# Patient Record
Sex: FEMALE | Race: ASIAN | NOT HISPANIC OR LATINO | Employment: FULL TIME | URBAN - METROPOLITAN AREA
[De-identification: names, ages, dates, MRNs, and addresses within clinical notes are randomized per-mention and may not be internally consistent; named-entity substitution may affect disease eponyms.]

---

## 2017-03-27 ENCOUNTER — AMBULATORY - HEALTHEAST (OUTPATIENT)
Dept: NURSING | Facility: CLINIC | Age: 27
End: 2017-03-27

## 2017-07-27 ENCOUNTER — COMMUNICATION - HEALTHEAST (OUTPATIENT)
Dept: FAMILY MEDICINE | Facility: CLINIC | Age: 27
End: 2017-07-27

## 2017-07-28 ENCOUNTER — AMBULATORY - HEALTHEAST (OUTPATIENT)
Dept: FAMILY MEDICINE | Facility: CLINIC | Age: 27
End: 2017-07-28

## 2017-07-28 DIAGNOSIS — R06.81 APNEA: ICD-10-CM

## 2017-07-28 DIAGNOSIS — R06.83 SNORING: ICD-10-CM

## 2017-07-30 ENCOUNTER — COMMUNICATION - HEALTHEAST (OUTPATIENT)
Dept: FAMILY MEDICINE | Facility: CLINIC | Age: 27
End: 2017-07-30

## 2017-08-31 ENCOUNTER — OFFICE VISIT - HEALTHEAST (OUTPATIENT)
Dept: SLEEP MEDICINE | Facility: CLINIC | Age: 27
End: 2017-08-31

## 2017-08-31 DIAGNOSIS — G47.8 SLEEP DYSFUNCTION WITH SLEEP STAGE DISTURBANCE: ICD-10-CM

## 2017-08-31 DIAGNOSIS — G47.10 HYPERSOMNIA, UNSPECIFIED: ICD-10-CM

## 2017-08-31 DIAGNOSIS — R29.818 SUSPECTED SLEEP APNEA: ICD-10-CM

## 2017-08-31 DIAGNOSIS — R06.83 SNORING: ICD-10-CM

## 2017-08-31 ASSESSMENT — MIFFLIN-ST. JEOR: SCORE: 1978.71

## 2017-09-05 ENCOUNTER — COMMUNICATION - HEALTHEAST (OUTPATIENT)
Dept: FAMILY MEDICINE | Facility: CLINIC | Age: 27
End: 2017-09-05

## 2017-09-11 ENCOUNTER — PRENATAL OFFICE VISIT - HEALTHEAST (OUTPATIENT)
Dept: FAMILY MEDICINE | Facility: CLINIC | Age: 27
End: 2017-09-11

## 2017-09-11 DIAGNOSIS — Z32.01 POSITIVE PREGNANCY TEST: ICD-10-CM

## 2017-09-11 DIAGNOSIS — Z34.90 NORMAL PREGNANCY: ICD-10-CM

## 2017-09-11 DIAGNOSIS — E55.9 VITAMIN D DEFICIENCY: ICD-10-CM

## 2017-09-11 DIAGNOSIS — N92.6 ABNORMAL MENSES: ICD-10-CM

## 2017-09-11 DIAGNOSIS — Z91.09 ENVIRONMENTAL ALLERGIES: ICD-10-CM

## 2017-09-11 LAB — HIV 1+2 AB+HIV1 P24 AG SERPL QL IA: NEGATIVE

## 2017-09-12 ENCOUNTER — HOSPITAL ENCOUNTER (OUTPATIENT)
Dept: ULTRASOUND IMAGING | Facility: HOSPITAL | Age: 27
Discharge: HOME | End: 2017-09-12
Attending: PHYSICIAN ASSISTANT

## 2017-09-12 DIAGNOSIS — Z34.90 NORMAL PREGNANCY: ICD-10-CM

## 2017-09-12 DIAGNOSIS — Z32.01 POSITIVE PREGNANCY TEST: ICD-10-CM

## 2017-09-12 LAB
HBV SURFACE AG SERPL QL IA: NEGATIVE
SYPHILIS RPR SCREEN - HISTORICAL: NORMAL

## 2017-09-14 ENCOUNTER — RECORDS - HEALTHEAST (OUTPATIENT)
Dept: SLEEP MEDICINE | Facility: CLINIC | Age: 27
End: 2017-09-14

## 2017-09-14 ENCOUNTER — COMMUNICATION - HEALTHEAST (OUTPATIENT)
Dept: FAMILY MEDICINE | Facility: CLINIC | Age: 27
End: 2017-09-14

## 2017-09-14 ENCOUNTER — RECORDS - HEALTHEAST (OUTPATIENT)
Dept: ADMINISTRATIVE | Facility: OTHER | Age: 27
End: 2017-09-14

## 2017-09-14 DIAGNOSIS — G47.30 SLEEP APNEA, UNSPECIFIED: ICD-10-CM

## 2017-09-14 DIAGNOSIS — G47.10 HYPERSOMNIA, UNSPECIFIED: ICD-10-CM

## 2017-09-14 DIAGNOSIS — R06.83 SNORING: ICD-10-CM

## 2017-09-14 DIAGNOSIS — G47.8 OTHER SLEEP DISORDERS: ICD-10-CM

## 2017-09-18 ENCOUNTER — COMMUNICATION - HEALTHEAST (OUTPATIENT)
Dept: SLEEP MEDICINE | Facility: CLINIC | Age: 27
End: 2017-09-18

## 2017-10-12 ENCOUNTER — COMMUNICATION - HEALTHEAST (OUTPATIENT)
Dept: FAMILY MEDICINE | Facility: CLINIC | Age: 27
End: 2017-10-12

## 2017-10-18 ENCOUNTER — OFFICE VISIT - HEALTHEAST (OUTPATIENT)
Dept: FAMILY MEDICINE | Facility: CLINIC | Age: 27
End: 2017-10-18

## 2017-10-18 ENCOUNTER — COMMUNICATION - HEALTHEAST (OUTPATIENT)
Dept: FAMILY MEDICINE | Facility: CLINIC | Age: 27
End: 2017-10-18

## 2017-10-18 DIAGNOSIS — N76.0 VAGINITIS: ICD-10-CM

## 2017-10-19 ENCOUNTER — OFFICE VISIT - HEALTHEAST (OUTPATIENT)
Dept: SLEEP MEDICINE | Facility: CLINIC | Age: 27
End: 2017-10-19

## 2017-10-19 ENCOUNTER — AMBULATORY - HEALTHEAST (OUTPATIENT)
Dept: SLEEP MEDICINE | Facility: CLINIC | Age: 27
End: 2017-10-19

## 2017-10-19 DIAGNOSIS — G47.33 OSA (OBSTRUCTIVE SLEEP APNEA): ICD-10-CM

## 2017-10-19 DIAGNOSIS — E66.9 OBESITY: ICD-10-CM

## 2017-10-19 DIAGNOSIS — G47.10 HYPERSOMNIA: ICD-10-CM

## 2017-10-19 DIAGNOSIS — G47.8 SLEEP DYSFUNCTION WITH SLEEP STAGE DISTURBANCE: ICD-10-CM

## 2017-10-19 ASSESSMENT — MIFFLIN-ST. JEOR: SCORE: 1975.54

## 2017-10-24 ENCOUNTER — PRENATAL OFFICE VISIT - HEALTHEAST (OUTPATIENT)
Dept: FAMILY MEDICINE | Facility: CLINIC | Age: 27
End: 2017-10-24

## 2017-10-24 ENCOUNTER — OFFICE VISIT - HEALTHEAST (OUTPATIENT)
Dept: FAMILY MEDICINE | Facility: CLINIC | Age: 27
End: 2017-10-24

## 2017-10-24 DIAGNOSIS — Z34.91 FIRST TRIMESTER PREGNANCY: ICD-10-CM

## 2017-10-24 DIAGNOSIS — R30.0 DYSURIA: ICD-10-CM

## 2017-10-24 DIAGNOSIS — N39.0 UTI (URINARY TRACT INFECTION): ICD-10-CM

## 2017-10-24 DIAGNOSIS — Z86.32 HX OF GESTATIONAL DIABETES IN PRIOR PREGNANCY, CURRENTLY PREGNANT: ICD-10-CM

## 2017-10-24 DIAGNOSIS — Z34.92 SECOND TRIMESTER PREGNANCY: ICD-10-CM

## 2017-10-24 DIAGNOSIS — O09.299 HX OF GESTATIONAL DIABETES IN PRIOR PREGNANCY, CURRENTLY PREGNANT: ICD-10-CM

## 2017-10-24 LAB — HBA1C MFR BLD: 6.1 % (ref 3.5–6)

## 2017-10-27 ENCOUNTER — COMMUNICATION - HEALTHEAST (OUTPATIENT)
Dept: FAMILY MEDICINE | Facility: CLINIC | Age: 27
End: 2017-10-27

## 2017-10-30 ENCOUNTER — COMMUNICATION - HEALTHEAST (OUTPATIENT)
Dept: FAMILY MEDICINE | Facility: CLINIC | Age: 27
End: 2017-10-30

## 2017-10-31 ENCOUNTER — AMBULATORY - HEALTHEAST (OUTPATIENT)
Dept: EDUCATION SERVICES | Facility: CLINIC | Age: 27
End: 2017-10-31

## 2017-11-10 ENCOUNTER — AMBULATORY - HEALTHEAST (OUTPATIENT)
Dept: EDUCATION SERVICES | Facility: CLINIC | Age: 27
End: 2017-11-10

## 2017-11-17 ENCOUNTER — OFFICE VISIT - HEALTHEAST (OUTPATIENT)
Dept: EDUCATION SERVICES | Facility: CLINIC | Age: 27
End: 2017-11-17

## 2017-11-17 ENCOUNTER — COMMUNICATION - HEALTHEAST (OUTPATIENT)
Dept: FAMILY MEDICINE | Facility: CLINIC | Age: 27
End: 2017-11-17

## 2017-11-21 ENCOUNTER — PRENATAL OFFICE VISIT - HEALTHEAST (OUTPATIENT)
Dept: FAMILY MEDICINE | Facility: CLINIC | Age: 27
End: 2017-11-21

## 2017-11-21 DIAGNOSIS — Z34.92 SECOND TRIMESTER PREGNANCY: ICD-10-CM

## 2017-12-01 ENCOUNTER — OFFICE VISIT - HEALTHEAST (OUTPATIENT)
Dept: EDUCATION SERVICES | Facility: CLINIC | Age: 27
End: 2017-12-01

## 2017-12-01 ENCOUNTER — COMMUNICATION - HEALTHEAST (OUTPATIENT)
Dept: FAMILY MEDICINE | Facility: CLINIC | Age: 27
End: 2017-12-01

## 2017-12-28 ENCOUNTER — AMBULATORY - HEALTHEAST (OUTPATIENT)
Dept: SLEEP MEDICINE | Facility: CLINIC | Age: 27
End: 2017-12-28

## 2018-01-03 ENCOUNTER — AMBULATORY - HEALTHEAST (OUTPATIENT)
Dept: SLEEP MEDICINE | Facility: CLINIC | Age: 28
End: 2018-01-03

## 2018-01-12 ENCOUNTER — COMMUNICATION - HEALTHEAST (OUTPATIENT)
Dept: FAMILY MEDICINE | Facility: CLINIC | Age: 28
End: 2018-01-12

## 2018-01-17 ENCOUNTER — COMMUNICATION - HEALTHEAST (OUTPATIENT)
Dept: FAMILY MEDICINE | Facility: CLINIC | Age: 28
End: 2018-01-17

## 2018-01-24 ENCOUNTER — AMBULATORY - HEALTHEAST (OUTPATIENT)
Dept: SLEEP MEDICINE | Facility: CLINIC | Age: 28
End: 2018-01-24

## 2018-01-25 ENCOUNTER — COMMUNICATION - HEALTHEAST (OUTPATIENT)
Dept: FAMILY MEDICINE | Facility: CLINIC | Age: 28
End: 2018-01-25

## 2018-01-28 ENCOUNTER — COMMUNICATION - HEALTHEAST (OUTPATIENT)
Dept: FAMILY MEDICINE | Facility: CLINIC | Age: 28
End: 2018-01-28

## 2018-02-28 ENCOUNTER — PRENATAL OFFICE VISIT - HEALTHEAST (OUTPATIENT)
Dept: FAMILY MEDICINE | Facility: CLINIC | Age: 28
End: 2018-02-28

## 2018-02-28 DIAGNOSIS — Z34.93 NORMAL PREGNANCY IN THIRD TRIMESTER: ICD-10-CM

## 2018-02-28 LAB
ALBUMIN UR-MCNC: NEGATIVE MG/DL
GLUCOSE UR STRIP-MCNC: NEGATIVE MG/DL
KETONES UR STRIP-MCNC: NEGATIVE MG/DL

## 2018-03-06 ENCOUNTER — RECORDS - HEALTHEAST (OUTPATIENT)
Dept: ADMINISTRATIVE | Facility: OTHER | Age: 28
End: 2018-03-06

## 2018-03-12 ENCOUNTER — RECORDS - HEALTHEAST (OUTPATIENT)
Dept: ADMINISTRATIVE | Facility: OTHER | Age: 28
End: 2018-03-12

## 2018-03-14 ENCOUNTER — PRENATAL OFFICE VISIT - HEALTHEAST (OUTPATIENT)
Dept: FAMILY MEDICINE | Facility: CLINIC | Age: 28
End: 2018-03-14

## 2018-03-14 DIAGNOSIS — Z34.93 NORMAL PREGNANCY IN THIRD TRIMESTER: ICD-10-CM

## 2018-03-14 DIAGNOSIS — O24.419 GESTATIONAL DIABETES: ICD-10-CM

## 2018-03-14 DIAGNOSIS — L30.9 ECZEMA: ICD-10-CM

## 2018-03-14 LAB
ALBUMIN UR-MCNC: ABNORMAL MG/DL
GLUCOSE UR STRIP-MCNC: ABNORMAL MG/DL
KETONES UR STRIP-MCNC: ABNORMAL MG/DL

## 2018-03-15 LAB
ALLERGIC TO PENICILLIN: NO
GP B STREP DNA SPEC QL NAA+PROBE: NEGATIVE

## 2018-03-20 ENCOUNTER — COMMUNICATION - HEALTHEAST (OUTPATIENT)
Dept: FAMILY MEDICINE | Facility: CLINIC | Age: 28
End: 2018-03-20

## 2018-03-27 ENCOUNTER — RECORDS - HEALTHEAST (OUTPATIENT)
Dept: ADMINISTRATIVE | Facility: OTHER | Age: 28
End: 2018-03-27

## 2018-03-28 ENCOUNTER — PRENATAL OFFICE VISIT - HEALTHEAST (OUTPATIENT)
Dept: FAMILY MEDICINE | Facility: CLINIC | Age: 28
End: 2018-03-28

## 2018-03-28 ENCOUNTER — HOSPITAL ENCOUNTER (OUTPATIENT)
Dept: ULTRASOUND IMAGING | Facility: HOSPITAL | Age: 28
Discharge: HOME | End: 2018-03-28
Attending: FAMILY MEDICINE

## 2018-03-28 DIAGNOSIS — O24.419 GESTATIONAL DIABETES: ICD-10-CM

## 2018-03-28 LAB
ALBUMIN UR-MCNC: ABNORMAL MG/DL
BASOPHILS # BLD AUTO: 0 THOU/UL (ref 0–0.2)
BASOPHILS NFR BLD AUTO: 0 % (ref 0–2)
EOSINOPHIL # BLD AUTO: 0.2 THOU/UL (ref 0–0.4)
EOSINOPHIL NFR BLD AUTO: 2 % (ref 0–6)
ERYTHROCYTE [DISTWIDTH] IN BLOOD BY AUTOMATED COUNT: 13 % (ref 11–14.5)
GLUCOSE UR STRIP-MCNC: NEGATIVE MG/DL
HBA1C MFR BLD: 6.3 % (ref 3.5–6)
HCT VFR BLD AUTO: 35.5 % (ref 35–47)
HGB BLD-MCNC: 11.5 G/DL (ref 12–16)
KETONES UR STRIP-MCNC: NEGATIVE MG/DL
LYMPHOCYTES # BLD AUTO: 1.4 THOU/UL (ref 0.8–4.4)
LYMPHOCYTES NFR BLD AUTO: 19 % (ref 20–40)
MCH RBC QN AUTO: 28.5 PG (ref 27–34)
MCHC RBC AUTO-ENTMCNC: 32.4 G/DL (ref 32–36)
MCV RBC AUTO: 88 FL (ref 80–100)
MONOCYTES # BLD AUTO: 0.5 THOU/UL (ref 0–0.9)
MONOCYTES NFR BLD AUTO: 6 % (ref 2–10)
NEUTROPHILS # BLD AUTO: 5.3 THOU/UL (ref 2–7.7)
NEUTROPHILS NFR BLD AUTO: 72 % (ref 50–70)
PLATELET # BLD AUTO: 188 THOU/UL (ref 140–440)
PMV BLD AUTO: 8.7 FL (ref 7–10)
RBC # BLD AUTO: 4.03 MILL/UL (ref 3.8–5.4)
WBC: 7.3 THOU/UL (ref 4–11)

## 2018-03-29 LAB — T PALLIDUM AB SER QL: NEGATIVE

## 2018-04-03 ENCOUNTER — COMMUNICATION - HEALTHEAST (OUTPATIENT)
Dept: FAMILY MEDICINE | Facility: CLINIC | Age: 28
End: 2018-04-03

## 2018-04-03 DIAGNOSIS — O24.414 INSULIN CONTROLLED GESTATIONAL DIABETES MELLITUS (GDM) IN THIRD TRIMESTER: ICD-10-CM

## 2018-04-05 ENCOUNTER — COMMUNICATION - HEALTHEAST (OUTPATIENT)
Dept: FAMILY MEDICINE | Facility: CLINIC | Age: 28
End: 2018-04-05

## 2018-04-06 ENCOUNTER — PRENATAL OFFICE VISIT - HEALTHEAST (OUTPATIENT)
Dept: FAMILY MEDICINE | Facility: CLINIC | Age: 28
End: 2018-04-06

## 2018-04-06 ENCOUNTER — HOSPITAL ENCOUNTER (OUTPATIENT)
Dept: ULTRASOUND IMAGING | Facility: CLINIC | Age: 28
Discharge: HOME | End: 2018-04-06
Attending: FAMILY MEDICINE

## 2018-04-06 DIAGNOSIS — O24.414 INSULIN CONTROLLED GESTATIONAL DIABETES MELLITUS (GDM) IN THIRD TRIMESTER: ICD-10-CM

## 2018-04-06 DIAGNOSIS — O24.414 INSULIN CONTROLLED GESTATIONAL DIABETES MELLITUS (GDM) DURING PREGNANCY, ANTEPARTUM: ICD-10-CM

## 2018-04-06 DIAGNOSIS — Z34.93 NORMAL PREGNANCY IN THIRD TRIMESTER: ICD-10-CM

## 2018-04-13 ENCOUNTER — AMBULATORY - HEALTHEAST (OUTPATIENT)
Dept: FAMILY MEDICINE | Facility: CLINIC | Age: 28
End: 2018-04-13

## 2018-04-13 DIAGNOSIS — K59.00 CONSTIPATION: ICD-10-CM

## 2018-04-13 DIAGNOSIS — R10.2 PERINEAL PAIN: ICD-10-CM

## 2018-05-09 ENCOUNTER — COMMUNICATION - HEALTHEAST (OUTPATIENT)
Dept: FAMILY MEDICINE | Facility: CLINIC | Age: 28
End: 2018-05-09

## 2018-05-14 ENCOUNTER — OFFICE VISIT - HEALTHEAST (OUTPATIENT)
Dept: FAMILY MEDICINE | Facility: CLINIC | Age: 28
End: 2018-05-14

## 2018-05-14 DIAGNOSIS — Z30.9 CONTRACEPTION MANAGEMENT: ICD-10-CM

## 2018-05-14 DIAGNOSIS — Z30.017 NEXPLANON INSERTION: ICD-10-CM

## 2018-05-14 LAB
HCG UR QL: NEGATIVE
SP GR UR STRIP: 1.02 (ref 1–1.03)

## 2018-09-07 ENCOUNTER — COMMUNICATION - HEALTHEAST (OUTPATIENT)
Dept: FAMILY MEDICINE | Facility: CLINIC | Age: 28
End: 2018-09-07

## 2018-09-30 ENCOUNTER — COMMUNICATION - HEALTHEAST (OUTPATIENT)
Dept: SCHEDULING | Facility: CLINIC | Age: 28
End: 2018-09-30

## 2018-09-30 DIAGNOSIS — N39.0 URINARY TRACT INFECTION: ICD-10-CM

## 2018-10-30 ENCOUNTER — COMMUNICATION - HEALTHEAST (OUTPATIENT)
Dept: FAMILY MEDICINE | Facility: CLINIC | Age: 28
End: 2018-10-30

## 2018-11-17 ENCOUNTER — OFFICE VISIT - HEALTHEAST (OUTPATIENT)
Dept: FAMILY MEDICINE | Facility: CLINIC | Age: 28
End: 2018-11-17

## 2018-11-17 DIAGNOSIS — R35.0 FREQUENT URINATION: ICD-10-CM

## 2018-11-17 LAB
ALBUMIN UR-MCNC: NEGATIVE MG/DL
APPEARANCE UR: ABNORMAL
BACTERIA #/AREA URNS HPF: ABNORMAL HPF
BILIRUB UR QL STRIP: NEGATIVE
COLOR UR AUTO: YELLOW
GLUCOSE UR STRIP-MCNC: ABNORMAL MG/DL
HGB UR QL STRIP: ABNORMAL
KETONES UR STRIP-MCNC: NEGATIVE MG/DL
LEUKOCYTE ESTERASE UR QL STRIP: ABNORMAL
NITRATE UR QL: NEGATIVE
PH UR STRIP: 5.5 [PH] (ref 5–8)
RBC #/AREA URNS AUTO: ABNORMAL HPF
SP GR UR STRIP: 1.02 (ref 1–1.03)
SQUAMOUS #/AREA URNS AUTO: ABNORMAL LPF
UROBILINOGEN UR STRIP-ACNC: ABNORMAL
WBC #/AREA URNS AUTO: ABNORMAL HPF

## 2018-11-19 LAB — BACTERIA SPEC CULT: ABNORMAL

## 2019-10-08 ENCOUNTER — OFFICE VISIT - HEALTHEAST (OUTPATIENT)
Dept: FAMILY MEDICINE | Facility: CLINIC | Age: 29
End: 2019-10-08

## 2019-10-08 DIAGNOSIS — K58.0 IRRITABLE BOWEL SYNDROME WITH DIARRHEA: ICD-10-CM

## 2019-10-08 DIAGNOSIS — Z12.4 SCREENING FOR CERVICAL CANCER: ICD-10-CM

## 2019-10-08 DIAGNOSIS — Z00.00 ANNUAL PHYSICAL EXAM: ICD-10-CM

## 2019-10-08 DIAGNOSIS — Z23 NEED FOR INFLUENZA VACCINATION: ICD-10-CM

## 2019-10-08 LAB
CHOLEST SERPL-MCNC: 123 MG/DL
CLUE CELLS: NORMAL
FASTING STATUS PATIENT QL REPORTED: YES
HBA1C MFR BLD: 5.3 % (ref 3.5–6)
HDLC SERPL-MCNC: 35 MG/DL
HGB BLD-MCNC: 13 G/DL (ref 12–16)
HIV 1+2 AB+HIV1 P24 AG SERPL QL IA: NEGATIVE
LDLC SERPL CALC-MCNC: 70 MG/DL
TRICHOMONAS, WET PREP: NORMAL
TRIGL SERPL-MCNC: 91 MG/DL
TSH SERPL DL<=0.005 MIU/L-ACNC: 1.12 UIU/ML (ref 0.3–5)
YEAST, WET PREP: NORMAL

## 2019-10-08 ASSESSMENT — MIFFLIN-ST. JEOR: SCORE: 1839.45

## 2019-10-09 LAB
BKR LAB AP ABNORMAL BLEEDING: NO
BKR LAB AP BIRTH CONTROL/HORMONES: NORMAL
BKR LAB AP CERVICAL APPEARANCE: NORMAL
BKR LAB AP GYN ADEQUACY: NORMAL
BKR LAB AP GYN INTERPRETATION: NORMAL
BKR LAB AP HPV REFLEX: NORMAL
BKR LAB AP LMP: NORMAL
BKR LAB AP PATIENT STATUS: NORMAL
BKR LAB AP PREVIOUS ABNORMAL: NORMAL
BKR LAB AP PREVIOUS NORMAL: NORMAL
C TRACH DNA SPEC QL PROBE+SIG AMP: NEGATIVE
HIGH RISK?: NO
N GONORRHOEA DNA SPEC QL NAA+PROBE: NEGATIVE
PATH REPORT.COMMENTS IMP SPEC: NORMAL
RESULT FLAG (HE HISTORICAL CONVERSION): NORMAL
T PALLIDUM AB SER QL: NEGATIVE

## 2019-10-20 ENCOUNTER — COMMUNICATION - HEALTHEAST (OUTPATIENT)
Dept: SCHEDULING | Facility: CLINIC | Age: 29
End: 2019-10-20

## 2019-11-06 ENCOUNTER — COMMUNICATION - HEALTHEAST (OUTPATIENT)
Dept: FAMILY MEDICINE | Facility: CLINIC | Age: 29
End: 2019-11-06

## 2019-11-10 ENCOUNTER — COMMUNICATION - HEALTHEAST (OUTPATIENT)
Dept: FAMILY MEDICINE | Facility: CLINIC | Age: 29
End: 2019-11-10

## 2019-11-18 ENCOUNTER — COMMUNICATION - HEALTHEAST (OUTPATIENT)
Dept: FAMILY MEDICINE | Facility: CLINIC | Age: 29
End: 2019-11-18

## 2019-11-18 ENCOUNTER — AMBULATORY - HEALTHEAST (OUTPATIENT)
Dept: FAMILY MEDICINE | Facility: CLINIC | Age: 29
End: 2019-11-18

## 2019-11-27 ENCOUNTER — OFFICE VISIT - HEALTHEAST (OUTPATIENT)
Dept: FAMILY MEDICINE | Facility: CLINIC | Age: 29
End: 2019-11-27

## 2019-11-27 DIAGNOSIS — R35.0 URINARY FREQUENCY: ICD-10-CM

## 2019-11-27 DIAGNOSIS — N30.00 ACUTE CYSTITIS WITHOUT HEMATURIA: ICD-10-CM

## 2019-11-27 LAB
ALBUMIN UR-MCNC: ABNORMAL MG/DL
APPEARANCE UR: ABNORMAL
BACTERIA #/AREA URNS HPF: ABNORMAL HPF
BILIRUB UR QL STRIP: ABNORMAL
COLOR UR AUTO: YELLOW
GLUCOSE UR STRIP-MCNC: NEGATIVE MG/DL
HGB UR QL STRIP: ABNORMAL
KETONES UR STRIP-MCNC: ABNORMAL MG/DL
LEUKOCYTE ESTERASE UR QL STRIP: ABNORMAL
MUCOUS THREADS #/AREA URNS LPF: ABNORMAL LPF
NITRATE UR QL: NEGATIVE
PH UR STRIP: 6.5 [PH] (ref 5–8)
RBC #/AREA URNS AUTO: ABNORMAL HPF
SP GR UR STRIP: >=1.03 (ref 1–1.03)
SQUAMOUS #/AREA URNS AUTO: ABNORMAL LPF
UROBILINOGEN UR STRIP-ACNC: ABNORMAL
WBC #/AREA URNS AUTO: ABNORMAL HPF
WBC CLUMPS #/AREA URNS HPF: PRESENT /[HPF]

## 2019-11-28 LAB — BACTERIA SPEC CULT: NO GROWTH

## 2019-12-22 ENCOUNTER — COMMUNICATION - HEALTHEAST (OUTPATIENT)
Dept: FAMILY MEDICINE | Facility: CLINIC | Age: 29
End: 2019-12-22

## 2020-01-27 ENCOUNTER — OFFICE VISIT - HEALTHEAST (OUTPATIENT)
Dept: FAMILY MEDICINE | Facility: CLINIC | Age: 30
End: 2020-01-27

## 2020-01-27 DIAGNOSIS — K58.0 IRRITABLE BOWEL SYNDROME WITH DIARRHEA: ICD-10-CM

## 2020-01-27 DIAGNOSIS — H91.93 BILATERAL HEARING LOSS, UNSPECIFIED HEARING LOSS TYPE: ICD-10-CM

## 2020-01-31 ENCOUNTER — COMMUNICATION - HEALTHEAST (OUTPATIENT)
Dept: FAMILY MEDICINE | Facility: CLINIC | Age: 30
End: 2020-01-31

## 2020-02-06 ENCOUNTER — OFFICE VISIT - HEALTHEAST (OUTPATIENT)
Dept: AUDIOLOGY | Facility: CLINIC | Age: 30
End: 2020-02-06

## 2020-02-06 DIAGNOSIS — H93.13 TINNITUS OF BOTH EARS: ICD-10-CM

## 2020-03-10 ENCOUNTER — COMMUNICATION - HEALTHEAST (OUTPATIENT)
Dept: SCHEDULING | Facility: CLINIC | Age: 30
End: 2020-03-10

## 2020-09-02 ENCOUNTER — VIRTUAL VISIT (OUTPATIENT)
Dept: SLEEP MEDICINE | Facility: CLINIC | Age: 30
End: 2020-09-02
Payer: COMMERCIAL

## 2020-09-02 DIAGNOSIS — G47.10 HYPERSOMNIA: ICD-10-CM

## 2020-09-02 DIAGNOSIS — G47.33 OBSTRUCTIVE SLEEP APNEA: Primary | ICD-10-CM

## 2020-09-02 PROCEDURE — 99213 OFFICE O/P EST LOW 20 MIN: CPT | Mod: 95 | Performed by: INTERNAL MEDICINE

## 2020-09-02 NOTE — PROGRESS NOTES
"The patient has been notified of following:     \"This video visit will be conducted via a call between you and your physician/provider. We have found that certain health care needs can be provided without the need for an in-person physical exam.  This service lets us provide the care you need with a video conversation.  If a prescription is necessary we can send it directly to your pharmacy.  If lab work is needed we can place an order for that and you can then stop by our lab to have the test done at a later time.    Video visits are billed at different rates depending on your insurance coverage. Please reach out to your insurance provider with any questions.    If during the course of the call the physician/provider feels a video visit is not appropriate, you will not be charged for this service.\"    Patient has given verbal consent to a Video visit? Yes    Will anyone else be joining your video visit? No    Doximity was used for audio/visual.    Audio and visual devices were used for this virtual clinic visit with permission from patient.    Thank you for the opportunity to participate in the care of Vijay Betancur.     She is a 29 year old y/o female patient who comes to the sleep medicine clinic for follow up.  Since her last clinical visit with me she had not follow through with getting set up on CPAP therapy.  She now wishes to proceed with CPAP therapy and was hoping that we will have to repeat any sleep studies.  She was diagnosed with moderate obstructive sleep apnea at our facility.  She still continues to feel tired and fatigued upon awakening.  Patient's review of system is otherwise negative.       Assessment and Plan:  In summary Vijay Betancur is a 29 year old year old female who is here for follow-up.    1. Obstructive sleep apnea  The patient would like to use a [employer] as the durable medical equipment company.  I informed the patient that it is possible that her durable medical equipment company may not accept " the sleep study due to the fact that is been done more than a year ago.  If needed she is willing to undergo another sleep study.  - COMPREHENSIVE DME    2. Hypersomnia  - COMPREHENSIVE DME    Sleep-Wake Cycle:    The patient likes to initiate sleep at around 9-10 PM with a sleep latency of 30 minutes. The patient has 1 nocturnal awakenings. Final wake up time is around 7-7:30 AM.    No past medical history on file.    No past surgical history on file.    Social History     Socioeconomic History     Marital status: Single     Spouse name: Not on file     Number of children: Not on file     Years of education: Not on file     Highest education level: Not on file   Occupational History     Not on file   Social Needs     Financial resource strain: Not on file     Food insecurity     Worry: Not on file     Inability: Not on file     Transportation needs     Medical: Not on file     Non-medical: Not on file   Tobacco Use     Smoking status: Not on file   Substance and Sexual Activity     Alcohol use: Not on file     Drug use: Not on file     Sexual activity: Not on file   Lifestyle     Physical activity     Days per week: Not on file     Minutes per session: Not on file     Stress: Not on file   Relationships     Social connections     Talks on phone: Not on file     Gets together: Not on file     Attends Faith service: Not on file     Active member of club or organization: Not on file     Attends meetings of clubs or organizations: Not on file     Relationship status: Not on file     Intimate partner violence     Fear of current or ex partner: Not on file     Emotionally abused: Not on file     Physically abused: Not on file     Forced sexual activity: Not on file   Other Topics Concern     Not on file   Social History Narrative     Not on file       Current Outpatient Medications   Medication Sig Dispense Refill     loperamide (IMODIUM A-D) 2 MG tablet Take 2 mg by mouth         No Known Allergies    No flowsheet  data found.    Physical Exam:  There were no vitals taken for this visit.  BMI:There is no height or weight on file to calculate BMI.   GEN: NAD,  Psych: normal mood, normal affect    Labs/Studies:    I reviewed the efficacy and compliance report from her device. Data summarized on the HPI and the PAP compliance flow sheet.        Patient verbalized understanding of these issues, agrees with the plan and all questions were answered today. Patient was given an opportuntity to voice any other symptoms or concerns not listed above. Patient did not have any other symptoms or concerns.      Srini José DO  Board Certified in Internal Medicine and Sleep Medicine    (Note created with Dragon voice recognition and unintended spelling errors and word substitutions may occur)     I spent a total of 20 minutes of face-to-face encounter and in preparation for this clinic visit.

## 2020-09-02 NOTE — PATIENT INSTRUCTIONS
Equipment Instructions    We will process your PAP order and send it to a Durable Medical Equipment (DME) provider.    The medical equipment company should call you within 7 days.  If you have not heard from the company, please contact them to see if they received your order and are planning to call you.    Please call us at [phone] if you are unable to contact the medical equipment company or if they do not have the order.    If you are starting a new PAP machine, please call us after you use it the first night to let us know how it went. This call also helps us know that you received your equipment and that everything is ready. Please use our central phone number [phone]    Contact information for [employer] company:    [place] Tel: [phone]

## 2020-09-09 ENCOUNTER — TELEPHONE (OUTPATIENT)
Dept: SLEEP MEDICINE | Facility: CLINIC | Age: 30
End: 2020-09-09

## 2020-09-09 NOTE — TELEPHONE ENCOUNTER
I called patient today 09/09/2020 at 3:11 pm to see if pt would like to go forth with pap machine. No answer, left message.

## 2020-09-23 ENCOUNTER — RECORDS - HEALTHEAST (OUTPATIENT)
Dept: LAB | Facility: HOSPITAL | Age: 30
End: 2020-09-23

## 2020-09-25 LAB
GAMMA INTERFERON BACKGROUND BLD IA-ACNC: 0.07 IU/ML
M TB IFN-G BLD-IMP: NEGATIVE
MITOGEN IGNF BCKGRD COR BLD-ACNC: -0.01 IU/ML
MITOGEN IGNF BCKGRD COR BLD-ACNC: 0 IU/ML
QTF INTERPRETATION: NORMAL
QTF MITOGEN - NIL: 5.37 IU/ML

## 2020-09-28 ENCOUNTER — TELEPHONE (OUTPATIENT)
Dept: SLEEP MEDICINE | Facility: CLINIC | Age: 30
End: 2020-09-28

## 2020-09-28 NOTE — TELEPHONE ENCOUNTER
CALLED PT AND NOTIFIED THE PT OF THE APPROVED PA PER INSURANCE  TO RECEIVE A CPAP MACHINE AND TO CALL [facility] TO SCHEDULE AN APPT.

## 2020-10-02 ENCOUNTER — COMMUNICATION - HEALTHEAST (OUTPATIENT)
Dept: SCHEDULING | Facility: CLINIC | Age: 30
End: 2020-10-02

## 2020-10-02 ENCOUNTER — SURGERY - HEALTHEAST (OUTPATIENT)
Dept: SURGERY | Facility: HOSPITAL | Age: 30
End: 2020-10-02

## 2020-10-02 ENCOUNTER — ANESTHESIA - HEALTHEAST (OUTPATIENT)
Dept: SURGERY | Facility: HOSPITAL | Age: 30
End: 2020-10-02

## 2020-10-02 ASSESSMENT — MIFFLIN-ST. JEOR
SCORE: 1850.79
SCORE: 1830.38

## 2020-10-03 ENCOUNTER — COMMUNICATION - HEALTHEAST (OUTPATIENT)
Dept: SCHEDULING | Facility: CLINIC | Age: 30
End: 2020-10-03

## 2020-10-05 ENCOUNTER — COMMUNICATION - HEALTHEAST (OUTPATIENT)
Dept: NURSING | Facility: CLINIC | Age: 30
End: 2020-10-05

## 2020-10-07 ENCOUNTER — COMMUNICATION - HEALTHEAST (OUTPATIENT)
Dept: SURGERY | Facility: CLINIC | Age: 30
End: 2020-10-07

## 2020-10-07 ENCOUNTER — COMMUNICATION - HEALTHEAST (OUTPATIENT)
Dept: NURSING | Facility: CLINIC | Age: 30
End: 2020-10-07

## 2020-10-12 ENCOUNTER — COMMUNICATION - HEALTHEAST (OUTPATIENT)
Dept: FAMILY MEDICINE | Facility: CLINIC | Age: 30
End: 2020-10-12

## 2020-10-12 ENCOUNTER — OFFICE VISIT - HEALTHEAST (OUTPATIENT)
Dept: FAMILY MEDICINE | Facility: CLINIC | Age: 30
End: 2020-10-12

## 2020-10-12 DIAGNOSIS — Z90.49 S/P LAPAROSCOPIC CHOLECYSTECTOMY: ICD-10-CM

## 2020-10-12 DIAGNOSIS — Z09 HOSPITAL DISCHARGE FOLLOW-UP: ICD-10-CM

## 2020-10-15 ENCOUNTER — DOCUMENTATION ONLY (OUTPATIENT)
Dept: SLEEP MEDICINE | Facility: CLINIC | Age: 30
End: 2020-10-15

## 2020-10-15 DIAGNOSIS — G47.33 OBSTRUCTIVE SLEEP APNEA: Primary | ICD-10-CM

## 2020-10-15 NOTE — PROGRESS NOTES
Patient was offered choice of vendor and chose [facility].  Patient Mao Her was set up at [place]  on October 15, 2020. Patient received a Resmed AirSense 10 Auto. Pressures were set at 5-15 cm H2O.   Patient s ramp is 5 cm H2O for Off and FLEX/EPR is EPR, 2.  Patient received a Resmed Mask name: Airfit P10 for her  Pillow mask, heated tubing and heated humidifier.  Patient does need to meet compliance. Patient has a follow up on TBD with Dr. José.    Bryant Her

## 2020-10-19 ENCOUNTER — DOCUMENTATION ONLY (OUTPATIENT)
Dept: SLEEP MEDICINE | Facility: CLINIC | Age: 30
End: 2020-10-19

## 2020-10-19 NOTE — PROGRESS NOTES
3 DAY STM VISIT    Diagnostic AHI: 18    PSG    Patient contacted for 3 day STM visit    Confirmed with patient at time of call- Yes Patient is still interested in STM service     Subjective measures:  Patient reports the  is complaining of the machine being loud.  She is unsure how loud it is.  She is working on mask fit and acclimation to using the machine.     Replacement device: No  STM ordered by provider: Yes     Device type: Auto-CPAP  PAP settings from order::  CPAP min 5 cm  H20       CPAP max 15 cm  H20        Mask type:    Nasal Pillows     Device settings from machine      Min CPAP 5.0            Max CPAP 15.0                EPR level Setting: TWO      RESMED Soft response setting:  OFF  Assessment: Nightly usage, most nights under four hours.  Action plan: Patient to have 14 day STM visit. Patient has a follow up visit scheduled:   no, but is required by insurance for compliance.     Total time spent on accessing and  interpreting remote patient PAP therapy data  12 minutes  Total time spent counseling, coaching  and reviewing PAP therapy data with patient  4 minutes  [id number] no

## 2020-10-30 ENCOUNTER — DOCUMENTATION ONLY (OUTPATIENT)
Dept: SLEEP MEDICINE | Facility: CLINIC | Age: 30
End: 2020-10-30
Payer: COMMERCIAL

## 2020-10-30 NOTE — PROGRESS NOTES
14  DAY STM VISIT    Diagnostic AHI: 18  PSG    Subjective measures:   Patient complaining about sleep paralysis, her  complains the machine is to loud.  Patient having trouble getting used to her CPAP pressure.     Assessment: Pt not meeting objective benchmarks for compliance  Patient failing following subjective benchmarks: not feeling benefit from therapy and noise from machine or mask keeping bed partner awake     Action plan: pt to have 30 day STM visit.      Device type: Auto-CPAP    PAP settings: CPAP min 5.0 cm  H20       CPAP max 15.0 cm  H20      95th% pressure 7.2 cm  H20        RESMED EPR level Setting: TWO    RESMED Soft response setting:  OFF    Mask type:  Nasal Pillows    Objective measures: 14 day rolling measures      Compliance  0 %      Leak  15.16  lpm  last  upload      AHI 3.96   last  upload      Average number of minutes 47      Objective measure goal  Compliance   Goal >70%  Leak   Goal < 24 lpm  AHI  Goal < 5  Usage  Goal >240        Total time spent on accessing and interpreting remote patient PAP therapy data  10 minutes    Total time spent counseling, coaching  and reviewing PAP therapy data with patient  6 minutes    [id number]  [id number]  no (3 day STM)

## 2020-11-18 ENCOUNTER — DOCUMENTATION ONLY (OUTPATIENT)
Dept: SLEEP MEDICINE | Facility: CLINIC | Age: 30
End: 2020-11-18
Payer: COMMERCIAL

## 2020-11-18 NOTE — PROGRESS NOTES
30 DAY [facility] VISIT    Diagnostic AHI: 18  PSG    Subjective measures:   Patient tried using CPAP last night and it lasted for an hour.  Patient will call back and let us know if she keeps wanting to use CPAP.     Assessment: Pt not meeting objective benchmarks for compliance  Patient failing following subjective benchmarks: noise from machine or mask keeping bed partner awake   Action plan: Patient call us if she decides to use alternative therapy.   Patient has not scheduled a follow up visit.   Device type: Auto-CPAP  PAP settings: CPAP min 5.0 cm  H20     CPAP max 15.0 cm  H20    95th% pressure 8.1 cm  H20      RESMED EPR level Setting: TWO    RESMED Soft response setting:  OFF  Mask type:  Nasal Pillows  Objective measures: 14 day rolling measures      Compliance  0 %      Leak  12 lpm  last  upload      AHI 5.6   last  upload      Average number of minutes 7      Objective measure goal  Compliance   Goal >70%  Leak   Goal < 24 lpm  AHI  Goal < 5  Usage  Goal >240        Total time spent on accessing and interpreting remote patient PAP therapy data  10 minutes    Total time spent counseling, coaching  and reviewing PAP therapy data with patient  3 minutes     [id number] this call  [id number] no  at 3 or 14 day [facility]

## 2021-01-04 ENCOUNTER — HEALTH MAINTENANCE LETTER (OUTPATIENT)
Age: 31
End: 2021-01-04

## 2021-01-15 ENCOUNTER — TELEPHONE (OUTPATIENT)
Dept: SLEEP MEDICINE | Facility: CLINIC | Age: 31
End: 2021-01-15

## 2021-01-15 NOTE — TELEPHONE ENCOUNTER
RETURNED PT CALL AND LET HER KNOW SHE CAN RETURN THE CPAP MACHINE TO ANY OF OUR SHOWROOMS. LET THE PT KNOW THAT WE JUST NEED THE CPAP MACHINE, POWER CORD AND THE BAG. INSTRUCTED THE PT TO KEEP ALL OTHER SUPPLIES IN CASE SHE WOULD RESTART THERAPY. SCREENED PT FOR COVID-19.

## 2021-01-26 ENCOUNTER — DOCUMENTATION ONLY (OUTPATIENT)
Dept: SLEEP MEDICINE | Facility: CLINIC | Age: 31
End: 2021-01-26

## 2021-05-31 VITALS — WEIGHT: 275.5 LBS | BODY MASS INDEX: 45.9 KG/M2 | HEIGHT: 65 IN

## 2021-05-31 VITALS — WEIGHT: 275.6 LBS | BODY MASS INDEX: 45.86 KG/M2

## 2021-05-31 VITALS — BODY MASS INDEX: 48.42 KG/M2 | WEIGHT: 291 LBS

## 2021-05-31 VITALS — WEIGHT: 276 LBS | BODY MASS INDEX: 45.93 KG/M2

## 2021-05-31 VITALS — WEIGHT: 276.2 LBS | BODY MASS INDEX: 46.02 KG/M2 | HEIGHT: 65 IN

## 2021-05-31 VITALS — BODY MASS INDEX: 45.58 KG/M2 | WEIGHT: 273.9 LBS

## 2021-05-31 VITALS — WEIGHT: 277 LBS | BODY MASS INDEX: 46.1 KG/M2

## 2021-05-31 VITALS — BODY MASS INDEX: 45.46 KG/M2 | WEIGHT: 273.19 LBS

## 2021-05-31 VITALS — WEIGHT: 285 LBS | BODY MASS INDEX: 47.43 KG/M2

## 2021-05-31 VITALS — WEIGHT: 278.3 LBS | BODY MASS INDEX: 46.31 KG/M2

## 2021-06-01 VITALS — BODY MASS INDEX: 48.76 KG/M2 | WEIGHT: 293 LBS

## 2021-06-01 VITALS — BODY MASS INDEX: 48.92 KG/M2 | WEIGHT: 293 LBS

## 2021-06-01 VITALS — BODY MASS INDEX: 46.15 KG/M2 | WEIGHT: 277.31 LBS

## 2021-06-02 VITALS — BODY MASS INDEX: 45.23 KG/M2 | WEIGHT: 271.8 LBS

## 2021-06-02 NOTE — TELEPHONE ENCOUNTER
"Her primary complaint is feel in short of breath.  I do not hear any wheezes or difficulty speaking in full sentences.   She states she can not take a deep breath. No cough.  She states the feeling in her upper abdomen is more like when she has heartburn.  And more in her mid-chest area than above her navel.  She would like to be seen in the ED.  She does not want to wake her  and the children.  She is going to drive herself.  I cautioned her to take her cell phone.  If she gets more distressed she should pull to the side of the road and call 9-1-1.  She verbalizes understanding.  Marni Ayala RN, BAN, Nurse Advisor, [place] 11p-7a      Reason for Disposition    [1] MILD difficulty breathing (e.g., minimal/no SOB at rest, SOB with walking, pulse <100) AND [2] NEW-onset or WORSE than normal    Answer Assessment - Initial Assessment Questions  1. RESPIRATORY STATUS: \"Describe your breathing?\" (e.g., wheezing, shortness of breath, unable to speak, severe coughing)       She states she feels \"short of breath\"  2. ONSET: \"When did this breathing problem begin?\"       About 40 minutes ago  3. PATTERN \"Does the difficult breathing come and go, or has it been constant since it started?\"       Constant  4. SEVERITY: \"How bad is your breathing?\" (e.g., mild, moderate, severe)     - MILD: No SOB at rest, mild SOB with walking, speaks normally in sentences, can lay down, no retractions, pulse < 100.     - MODERATE: SOB at rest, SOB with minimal exertion and prefers to sit, cannot lie down flat, speaks in phrases, mild retractions, audible wheezing, pulse 100-120.     - SEVERE: Very SOB at rest, speaks in single words, struggling to breathe, sitting hunched forward, retractions, pulse > 120       Mild to moderate  5. RECURRENT SYMPTOM: \"Have you had difficulty breathing before?\" If so, ask: \"When was the last time?\" and \"What happened that time?\"       Denies  6. CARDIAC HISTORY: \"Do you have any history of heart " "disease?\" (e.g., heart attack, angina, bypass surgery, angioplasty)       Denies  7. LUNG HISTORY: \"Do you have any history of lung disease?\"  (e.g., pulmonary embolus, asthma, emphysema)      Denies   8. CAUSE: \"What do you think is causing the breathing problem?\"       ? \"Because her chest hurts?\"  9. OTHER SYMPTOMS: \"Do you have any other symptoms? (e.g., dizziness, runny nose, cough, chest pain, fever)      There is pain in \"way\" upper abdomen.  More so in mid chest area.  Kind of like when she gets heartburn  10. PREGNANCY: \"Is there any chance you are pregnant?\" \"When was your last menstrual period?\"        Denies  Has Nexplanon in place  11. TRAVEL: \"Have you traveled out of the country in the last month?\" (e.g., travel history, exposures)        No    Protocols used: BREATHING DIFFICULTY-A-AH      "

## 2021-06-02 NOTE — PROGRESS NOTES
ANNUAL       Chief Complaint   Patient presents with     Annual Exam     no concerns       HISTORY OF PRESENT ILLNESS:  Pt is a 28 y.o.     alert female who presents today for an annual exam.    Concerns today: She would like her A1c checked and other fasting labs. Had hx of GDM with last pregnancy and never had a recheck. She is currently on the keto diet and trying to loose weight. SHe is down 35lb since I last saw her! congratulated her on this. She wants to get down to 190lb.   Wondering with the IBS if she can be tested for this somehow. Notes improvement with diet changes she has made.     Contraception: Nexplanon in place    Std screening: desires    Healthy Habits:   Regular Exercise: yes  Healthy Diet: yes, on keto diet  Dental Visits Regularly: yes  Seat Belt: yes  Sexually active: yes, with   Self Breast Exam Monthly:no    Patient's last menstrual period was 2019 (approximate).    No Known Allergies    Current Outpatient Medications on File Prior to Visit   Medication Sig Dispense Refill     docusate sodium (COLACE) 100 MG capsule Take 1 capsule (100 mg total) by mouth daily. 45 capsule 0     ibuprofen (ADVIL,MOTRIN) 800 MG tablet Take 1 tablet (800 mg total) by mouth every 8 (eight) hours. 90 tablet 0     loratadine (CLARITIN) 10 mg tablet   2     phenazopyridine (PYRIDIUM) 200 MG tablet Take 1 tablet (200 mg total) by mouth 3 (three) times a day as needed for pain. 20 tablet 0     traMADol (ULTRAM) 50 mg tablet Take 1-2 tablets ( mg total) by mouth every 6 (six) hours as needed for pain. 8 tablet 0     triamcinolone (KENALOG) 0.1 % cream Apply to area twice a day for 1 week. 30 g 0     witch hazel (HEMORRHOIDAL, WITCH HAZEL,) 50 % PadM Apply to perineum as needed. 10 each 0     No current facility-administered medications on file prior to visit.        Past Medical History:   Diagnosis Date     Gestational diabetes mellitus (GDM) 10/12/2016    insulin dependant for over 4 mos      "Menarche 16 y/o    Menarche 16 y/o, menses qmo, bleeding x7 days (heavier since birth of 2nd child)      (normal spontaneous vaginal delivery) 2016      (normal spontaneous vaginal delivery) 40-6 10/30/2015    - 10/31/15:  40w6d [facility]/Loredo, labor 2h57m (push 8m, CHRIS), no pain meds, no lac, EBL 175mL (recd pitocin 10mU IM). GBS neg. Female BW 7#14.3 oz, L 20\", HC 13.5\", apgars 9/9. formula-feeding         Postpartum depression     possible postpartum depression, mild     UTI in pregnancy 2014    - 14 prenatal 36+wk GA: UCx >100k eColi pan-sensitive - 6/10/15 prenatal 20-3 (walk in clinic): UCx >100k eColi (R amp/tetracycline/TMP-SMX), treated with nitrofurantoin - 2015 prenatal 24w3d: sxs resolved, Ucx NEG  - 2015 MyChart message: c/o strong odor back again, advised NTBS and check urine culture and if again has UTI then will need to keep her on prophylactic antibiotics for duration of pregnancy//sds * 7/23/15 UCx NEG        Past Surgical History:   Procedure Laterality Date     NO PAST SURGERIES         Social History     Socioeconomic History     Marital status: Single     Spouse name: Eliu Cohen     Number of children: 3     Years of education: [id number]     Highest education level: Not on file   Occupational History     Comment: 3/19/15: \"Works at [employer] as a CNA since 3/2015\"   Social Needs     Financial resource strain: Not on file     Food insecurity:     Worry: Not on file     Inability: Not on file     Transportation needs:     Medical: Not on file     Non-medical: Not on file   Tobacco Use     Smoking status: Never Smoker     Smokeless tobacco: Never Used   Substance and Sexual Activity     Alcohol use: No     Comment: none     Drug use: No     Sexual activity: Yes     Partners: Male     Birth control/protection: None   Lifestyle     Physical activity:     Days per week: Not on file     Minutes per session: Not on file     Stress: Not on file   Relationships     " "Social connections:     Talks on phone: Not on file     Gets together: Not on file     Attends Moravian service: Not on file     Active member of club or organization: Not on file     Attends meetings of clubs or organizations: Not on file     Relationship status: Not on file     Intimate partner violence:     Fear of current or ex partner: Not on file     Emotionally abused: Not on file     Physically abused: Not on file     Forced sexual activity: Not on file   Other Topics Concern     Not on file   Social History Narrative    Notes per Dr Loredo 2015 (updated 10/31/2015):         HOME ENVIRONMENT:    - 2015: Lives with , 3 children (son  11, daughter  14, daughter  10/31/15), no pets, apt with stairs, in [facility]        MARITAL HISTORY:    - culturally  to Eliu Cohen since         EDUCATION:    - High School Grad, [employer]     - CNA at [employer]     - [facility] [employer] x2 yrs \"generals\"        NATIVE LANGUAGE:    - HMONG = 1st language in home    - ENLISH fluent        HOBBIES/LEISURE ACTIVITIES:    - family time, music        PERSONAL HISTORY:    - Born in [facility]. Family came to [facility] , then moved to [facility] . Family/parents originally from [place] [facility].        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, homemaker            Family History   Problem Relation Age of Onset     No Medical Problems Son          11     No Medical Problems Daughter          14     Stroke Paternal Grandfather 50        estimate 50s y/o     Hypertension Paternal Grandfather      Diabetes type II Paternal Grandmother         Pat Gma DM2, CAD     Heart disease Paternal Grandmother         Pat Gma DM2, CAD     Diabetes type II Paternal Uncle         insulin dependent DM2, obese     Gestational diabetes Cousin         GDM insulin dependent with 2nd pregnancy, obese     Diabetes type II Paternal " "Aunt      No Medical Problems Mother      No Medical Problems Father      No Medical Problems Sister      No Medical Problems Brother      No Medical Problems Sister      No Medical Problems Brother      No Medical Problems Brother      Cancer Neg Hx        OB History        5    Para   5    Term   5       0    AB   0    Living   5       SAB   0    TAB   0    Ectopic   0    Multiple   0    Live Births   5                 GYNECOLOGIC HISTORY:   Menses are regular, and normal in consistency.   Mao Her has no history of STDs.  Mao Her has no history of abnormal Pap smears.   Last pap result was normal .    REVIEW OF SYSTEMS:    Constitutional:  Denies Headache.+  Intentional wt loss.  No fevers or chills.    HEENT:  Denies vision changes or hearing changes. No sinus problems.  Breasts:  Denies breast masses, pain or nipple discharge.    Respiratory:  No breathing issues, cough or shortness of breath  Cardiovascular:  Denies chest pain, syncope or palpitations.    GI:  Denies nausea, vomiting, or constipation.  IBS improved (diarrhea type)  Endocrine:  Denies hot flashes, night sweats, heat or cold intolerance.  Hematologic:  Denies easy bruising or bleeding disorders.  Allergies/Immunologic:  Denies seasonal allergies or any history of immunologic disorders  Neurologic:  Normal sensation and motor control.  No history of seizures or syncope.  Musculoskeletal:  Denies joint pain, swelling, or erythema  Skin:  Denies rashes, significant lesions or pruritis.  Psychiatric:  Denies anxiety, depression, memory deficits, and appetite or sleep changes.    PHYSICAL EXAM  /80 (Patient Site: Right Arm, Patient Position: Sitting, Cuff Size: Adult Large)   Pulse 70   Ht 5' 6\" (1.676 m)   Wt (!) 242 lb (109.8 kg)   LMP 2019 (Approximate)   BMI 39.06 kg/m    GENERAL APPEARANCE:  The patient is a pleasant, normal appearing female with normal affect and in no distress.  HEENT: Normocephalic " atraumatic.  PERRL, ocular muscles intact.  No conjunctivitis or icterus noticed.  TMs clear with good cone of light reflex.  Oropharynx clear and moist mucous membranes.  NECK: supple.  No cervical lymphadenopathy.  No thyromegaly, no nodules palpated, trachea midline.  LUNGS: Clear bilaterally with normal respiratory effort  HEART:   Regular rate and rhythm.  No murmurs noted.  Pulses are full and symmetrical.  BREASTS: Declines.   ABDOMEN: Soft, non-tender, non-distended.  No hepatosplenomegaly.  Normal bowel sounds.  No umbilical or inguinal hernias.  SKIN:  Warm and dry to touch.  No lesions or rashes noted.    PSYCHIATRIC/NEUROLOGIC:  Appropriate mood and affect, normal recall, alert and oriented x 3  EXTREMITIES:  Warm and well perfused.  No edema noted.  Muscle strength and sensation are normal bilaterally 5/5 in both upper and lower extremities.   :  Vulva: Inspection of her external genitalia reveals normal mons pubis, labia minora and labia majora.  Normal appearing clitoris, urethral meatus and [place]'s glands.    Bladder:  No evidence of urethral or bladder tenderness.    Vagina:  Speculum exam reveals pink and moist vaginal mucosa.  Bartholin gland is normal to palpation.  Cervix:  Cervix is normal in appearance with no lesions.  There is no cervical motion tenderness.  Uterus:  Uterus is normal size, mobile and non-tender.  No adnexal masses are palpated.  Adnexa are non-tender to palpation  Perineum:  Perineum appears normal.  Anus:  Normal with no apparent lesions.       PHQ9:   Little interest or pleasure in doing things: Not at all  Feeling down, depressed, or hopeless: Not at all      Vijay was seen today for annual exam.    Diagnoses and all orders for this visit:    Annual physical exam  Self breast exam risks/benefits reviewed  Safe sex practices reviewed with patient  Contraception reviewed and has Nexplanon which she likes.   HPV testing dicussed and new Pap smear recommendations reviewed  with patient- she is due in December. Will do today as she will not return for this.   -     Glycosylated Hemoglobin A1c  -     Hemoglobin  -     Lipid [place] FASTING  -     Ambulatory referral to Nutrition Services  -     Cancel: Chlamydia trachomatis & Neisseria gonorrhoeae, Amplified Detection  -     HIV Antigen/Antibody Screening [place]  -     Syphilis Screen, [facility]  -     Thyroid Stimulating Hormone (TSH)  -     Chlamydia trachomatis & Neisseria gonorrhoeae, Amplified Detection  -     Wet Prep, Vaginal    Screening for cervical cancer  -     Gynecologic Cytology (PAP Smear)    Irritable bowel syndrome with diarrhea:  Improvements with diet changes. Will refer to nutrition services to see if we can make more improvements/ find the cause of the issue.   -     Ambulatory referral to Nutrition Services      BMI 39.0-39.9,adult: screening labs as below. Referral to nutrition to support her in her weight loss achievements.   -     Glycosylated Hemoglobin A1c  -     Lipid [facility] FASTING  -     Thyroid Stimulating Hormone (TSH)        -     Ambulatory referral to Nutrition Services    Need for influenza vaccination  -     Influenza, Seasonal [place], PF =/> 6months      Maggy Guerrero

## 2021-06-03 VITALS
SYSTOLIC BLOOD PRESSURE: 108 MMHG | BODY MASS INDEX: 35.51 KG/M2 | DIASTOLIC BLOOD PRESSURE: 73 MMHG | HEART RATE: 66 BPM | TEMPERATURE: 98.2 F | RESPIRATION RATE: 15 BRPM | WEIGHT: 220 LBS | OXYGEN SATURATION: 100 %

## 2021-06-03 VITALS
HEIGHT: 66 IN | BODY MASS INDEX: 38.89 KG/M2 | HEART RATE: 70 BPM | SYSTOLIC BLOOD PRESSURE: 104 MMHG | WEIGHT: 242 LBS | DIASTOLIC BLOOD PRESSURE: 80 MMHG

## 2021-06-03 NOTE — PROGRESS NOTES
"Impression:  Acute cystitis    Plan:  Macrobid for 5 days, fluids, follow-up with primary care as needed      Chief Complaint:  Chief Complaint   Patient presents with     Urinary Frequency     burning         HPI:   Mao Her is a 29 y.o. female who presents to this clinic for the evaluation of urinary symptoms.  Patient complains of a 1 day history of urinary frequency and dysuria.  She is currently having her menstrual period which is normal.  No back pain, fever, abdominal pain, or vomiting.  She has had previous urinary tract infections and this reminds her of her previous infections      PMH:   Past Medical History:   Diagnosis Date     Gestational diabetes mellitus (GDM) 10/12/2016    insulin dependant for over 4 mos     Menarche 18 y/o    Menarche 18 y/o, menses qmo, bleeding x7 days (heavier since birth of 2nd child)      (normal spontaneous vaginal delivery) 2016      (normal spontaneous vaginal delivery) 40-6 10/30/2015    - 10/31/15:  40w6d [facility]/Loredo, labor 2h57m (push 8m, CHRIS), no pain meds, no lac, EBL 175mL (recd pitocin 10mU IM). GBS neg. Female BW 7#14.3 oz, L 20\", HC 13.5\", apgars 9/9. formula-feeding         Postpartum depression     possible postpartum depression, mild     UTI in pregnancy 2014    - 14 prenatal 36+wk GA: UCx >100k eColi pan-sensitive - 6/10/15 prenatal 20-3 (walk in clinic): UCx >100k eColi (R amp/tetracycline/TMP-SMX), treated with nitrofurantoin - 2015 prenatal 24w3d: sxs resolved, Ucx NEG  - 2015 MyChart message: c/o strong odor back again, advised NTBS and check urine culture and if again has UTI then will need to keep her on prophylactic antibiotics for duration of pregnancy//sds * 7/23/15 UCx NEG      Past Surgical History:   Procedure Laterality Date     NO PAST SURGERIES           ROS:  All other systems negative    Meds:    Current Outpatient Medications:      acetaminophen (TYLENOL) 500 MG tablet, Take 500 mg by mouth every 6 " "(six) hours as needed for pain., Disp: , Rfl:      ibuprofen (ADVIL,MOTRIN) 800 MG tablet, Take 1 tablet (800 mg total) by mouth every 8 (eight) hours., Disp: 90 tablet, Rfl: 0     docusate sodium (COLACE) 100 MG capsule, Take 1 capsule (100 mg total) by mouth daily., Disp: 45 capsule, Rfl: 0     loratadine (CLARITIN) 10 mg tablet, , Disp: , Rfl: 2     phenazopyridine (PYRIDIUM) 200 MG tablet, Take 1 tablet (200 mg total) by mouth 3 (three) times a day as needed for pain., Disp: 20 tablet, Rfl: 0     traMADol (ULTRAM) 50 mg tablet, Take 1-2 tablets ( mg total) by mouth every 6 (six) hours as needed for pain., Disp: 8 tablet, Rfl: 0     triamcinolone (KENALOG) 0.1 % cream, Apply to area twice a day for 1 week., Disp: 30 g, Rfl: 0     witch hazel (HEMORRHOIDAL, WITCH HAZEL,) 50 % PadM, Apply to perineum as needed., Disp: 10 each, Rfl: 0        Social:  Social History     Socioeconomic History     Marital status: Single     Spouse name: Eliu Cohen     Number of children: 3     Years of education: [id number]     Highest education level: Not on file   Occupational History     Comment: 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/15: \"Works at [employer] as a CNA since 3/2015\"   Social Needs     Financial resource strain: Not on file     Food insecurity:     Worry: Not on file     Inability: Not on file     Transportation needs:     Medical: Not on file     Non-medical: Not on file   Tobacco Use     Smoking status: Never Smoker     Smokeless tobacco: Never Used   Substance and Sexual Activity     Alcohol use: No     Comment: none     Drug use: No     Sexual activity: Yes     Partners: Male     Birth control/protection: None   Lifestyle     Physical activity:     Days per week: Not on file     Minutes per session: Not on file     Stress: Not on file   Relationships     Social connections:     Talks on phone: Not on file     Gets together: Not on file     Attends Anglican service: Not on file     Active member of club or organization: Not on file     Attends " "meetings of clubs or organizations: Not on file     Relationship status: Not on file     Intimate partner violence:     Fear of current or ex partner: Not on file     Emotionally abused: Not on file     Physically abused: Not on file     Forced sexual activity: Not on file   Other Topics Concern     Not on file   Social History Narrative    Notes per Dr Loredo 2015 (updated 10/31/2015):         HOME ENVIRONMENT:    - 2015: Lives with , 3 children (son  11, daughter  14, daughter  10/31/15), no pets, apt with stairs, in [facility] [place] [facility]        MARITAL HISTORY:    - culturally  to Eliu Cohen since         EDUCATION:    - High School Grad, [employer]     - CNA at [employer]     - [employer] x2 yrs \"generals\"        NATIVE LANGUAGE:    - HMONG = 1st language in home    - ENLISH fluent        HOBBIES/LEISURE ACTIVITIES:    - family time, music        PERSONAL HISTORY:    - Born in [facility]. Family came to [facility] , then moved to [place] . Family/parents originally from [place].        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, homemaker              Physical Exam:  Sitting in a chair no distress  Vital signs reviewed  Eyes: PERRL, EOMI  Head: Atraumatic and normocephalic  Abdomen: Nontender without mass  There is no CVA tenderness  Skin: No lesions or rash  Neuro: Normal motor and sensory function in all extremities  Psych: Awake, alert, normally responsive      Results:    Recent Results (from the past 24 hour(s))   Urinalysis-UC if Indicated   Result Value Ref Range    Color, UA Yellow Colorless, Yellow, Straw, Light Yellow    Clarity, UA Cloudy (!) Clear    Glucose, UA Negative Negative    Bilirubin, UA Small (!) Negative    Ketones, UA 15 mg/dL (!) Negative    Specific Gravity, UA >=1.030 1.005 - 1.030    Blood, UA Moderate (!) Negative    pH, UA 6.5 5.0 - 8.0    Protein, UA 30 mg/dL (!) " Negative mg/dL    Urobilinogen, UA 2.0 E.U./dL (!) 0.2 E.U./dL, 1.0 E.U./dL    Nitrite, UA Negative Negative    Leukocytes, UA Large (!) Negative    Bacteria, UA Moderate (!) None Seen hpf    RBC, UA  (!) None Seen, 0-2 hpf    WBC, UA  (!) None Seen, 0-5 hpf    Squam Epithel, UA 5-10 (!) None Seen, 0-5 lpf    WBC Clumps Present (!) None Seen    Mucus, UA Few (!) None Seen lpf       No results found.      Lj Patel MD

## 2021-06-04 ENCOUNTER — RECORDS - HEALTHEAST (OUTPATIENT)
Dept: FAMILY MEDICINE | Facility: CLINIC | Age: 31
End: 2021-06-04

## 2021-06-04 ENCOUNTER — OFFICE VISIT - HEALTHEAST (OUTPATIENT)
Dept: FAMILY MEDICINE | Facility: CLINIC | Age: 31
End: 2021-06-04

## 2021-06-04 VITALS
RESPIRATION RATE: 12 BRPM | TEMPERATURE: 98.4 F | SYSTOLIC BLOOD PRESSURE: 100 MMHG | DIASTOLIC BLOOD PRESSURE: 78 MMHG | OXYGEN SATURATION: 98 % | BODY MASS INDEX: 38.25 KG/M2 | WEIGHT: 237 LBS | HEART RATE: 81 BPM

## 2021-06-04 VITALS — HEIGHT: 66 IN | WEIGHT: 244.5 LBS | BODY MASS INDEX: 39.29 KG/M2

## 2021-06-04 DIAGNOSIS — Z30.46 ENCOUNTER FOR NEXPLANON REMOVAL: ICD-10-CM

## 2021-06-04 DIAGNOSIS — Z31.9 PATIENT DESIRES PREGNANCY: ICD-10-CM

## 2021-06-04 DIAGNOSIS — Z00.00 ANNUAL PHYSICAL EXAM: ICD-10-CM

## 2021-06-04 DIAGNOSIS — E66.01 MORBID OBESITY (H): ICD-10-CM

## 2021-06-04 LAB
ALBUMIN SERPL-MCNC: 3.9 G/DL (ref 3.5–5)
ALP SERPL-CCNC: 43 U/L (ref 45–120)
ALT SERPL W P-5'-P-CCNC: 14 U/L (ref 0–45)
ANION GAP SERPL CALCULATED.3IONS-SCNC: 9 MMOL/L (ref 5–18)
AST SERPL W P-5'-P-CCNC: 10 U/L (ref 0–40)
BILIRUB DIRECT SERPL-MCNC: 0.4 MG/DL
BILIRUB SERPL-MCNC: 1.3 MG/DL (ref 0–1)
BUN SERPL-MCNC: 11 MG/DL (ref 8–22)
CALCIUM SERPL-MCNC: 8.9 MG/DL (ref 8.5–10.5)
CHLORIDE BLD-SCNC: 107 MMOL/L (ref 98–107)
CHOLEST SERPL-MCNC: 149 MG/DL
CLUE CELLS: ABNORMAL
CO2 SERPL-SCNC: 26 MMOL/L (ref 22–31)
CREAT SERPL-MCNC: 0.69 MG/DL (ref 0.6–1.1)
FASTING STATUS PATIENT QL REPORTED: YES
GFR SERPL CREATININE-BSD FRML MDRD: >60 ML/MIN/1.73M2
GLUCOSE BLD-MCNC: 125 MG/DL (ref 70–125)
HBA1C MFR BLD: 5.9 %
HDLC SERPL-MCNC: 41 MG/DL
LDLC SERPL CALC-MCNC: 92 MG/DL
POTASSIUM BLD-SCNC: 4.2 MMOL/L (ref 3.5–5)
PROT SERPL-MCNC: 7 G/DL (ref 6–8)
SODIUM SERPL-SCNC: 142 MMOL/L (ref 136–145)
TRICHOMONAS, WET PREP: ABNORMAL
TRIGL SERPL-MCNC: 78 MG/DL
YEAST, WET PREP: ABNORMAL

## 2021-06-04 ASSESSMENT — MIFFLIN-ST. JEOR: SCORE: 1902.96

## 2021-06-05 LAB
C TRACH DNA SPEC QL NAA+PROBE: NEGATIVE
N GONORRHOEA DNA SPEC QL NAA+PROBE: NEGATIVE
SPEC DESCRIPTION: NORMAL
SPECIMEN DESCRIPTION: NORMAL

## 2021-06-05 NOTE — PROGRESS NOTES
[facility] CLINIC EXAM NOTE      Chief Complaint   Patient presents with     Hearing problems     since 2012     Irritable bowel medication           ASSESSMENT & PLAN    Vijay was seen today for hearing problems and irritable bowel medication.    Diagnoses and all orders for this visit:    Irritable bowel syndrome with diarrhea:  History and exam most consistent with Irritable BS vs Inflammatory BS. No red flag s/s. Diarrhea dominant type. Will start treatment with scheduled imodium. F/u in 2 months. If no resolution/improvement plan referral to GI at that time. Patient agreeable. Denies pain/bloating as a major symptom and therefore will not treat for this at this time. F/u if red flag s/s sooner.   -     loperamide (IMODIUM A-D) 2 mg tablet; Take 1 tablet (2 mg total) by mouth 3 (three) times a day. 45 min before meals    Bilateral hearing loss, unspecified hearing loss type:  Scarring on TM b/l. Referral to audiology at this time. If hearing loss plan to refer to ENT for further evaluation. Possible serous effusion behind right TM.   -     Ambulatory referral to Audiology          HISTORY    Vijay Her is a 29 y.o.  female who presents for the following issues:    1. Hearin. Feels has gotten worse. Doesn't want to go to family gathering.  I hear you talking. The words are not processed. 2012 a lot of ringing. Not much ringing currently. Feels like an echo.   No underwater.   Says what a lot.   Did in clinic hearing test in  and passed. No further follow up discussed.       2. IBS: was doing a lot of fasting, not eating. Now eating more again. Has been the same. More diarrhea. bowels are explosive.   Started Freshman year of highschool. 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 Was using the bathroom constantly and sister in law said something to her.   NO blood. No mucous. Diarrhea. Never constipated.   Has BM 3 times a day.   Loose.  No joint pain. No fevers or chills.   No unexpected weight  "loss.   Not anxious  Feels embarrassed.   Skin tag. Had hemorrhoids after baby - would go away.   Cramping- goes to bathroom relives.   No bloating.   No pain all day long.   Usually right after she eats- sometimes even sooner before she finishes      MEDICATIONS    Current Outpatient Medications on File Prior to Visit   Medication Sig Dispense Refill     acetaminophen (TYLENOL) 500 MG tablet Take 500 mg by mouth every 6 (six) hours as needed for pain.       docusate sodium (COLACE) 100 MG capsule Take 1 capsule (100 mg total) by mouth daily. 45 capsule 0     ibuprofen (ADVIL,MOTRIN) 800 MG tablet Take 1 tablet (800 mg total) by mouth every 8 (eight) hours. 90 tablet 0     loratadine (CLARITIN) 10 mg tablet   2     phenazopyridine (PYRIDIUM) 200 MG tablet Take 1 tablet (200 mg total) by mouth 3 (three) times a day as needed for pain. 20 tablet 0     traMADol (ULTRAM) 50 mg tablet Take 1-2 tablets ( mg total) by mouth every 6 (six) hours as needed for pain. 8 tablet 0     triamcinolone (KENALOG) 0.1 % cream Apply to area twice a day for 1 week. 30 g 0     witch hazel (HEMORRHOIDAL, WITCH HAZEL,) 50 % PadM Apply to perineum as needed. 10 each 0     No current facility-administered medications on file prior to visit.        Pertinent past medical, surgical, social and family history reviewed and updated in [facility].    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/15: \"Works at [employer] as a CNA since 3/2015\"   Social Needs     Financial resource strain: Not on file     Food insecurity:     Worry: Not on file     Inability: Not on file     Transportation needs:     Medical: Not on file     Non-medical: Not on file   Tobacco Use     Smoking status: Never Smoker     Smokeless tobacco: Never Used   Substance and Sexual Activity     " "Alcohol use: No     Comment: none     Drug use: No     Sexual activity: Yes     Partners: Male     Birth control/protection: None   Lifestyle     Physical activity:     Days per week: Not on file     Minutes per session: Not on file     Stress: Not on file   Relationships     Social connections:     Talks on phone: Not on file     Gets together: Not on file     Attends Tenriism service: Not on file     Active member of club or organization: Not on file     Attends meetings of clubs or organizations: Not on file     Relationship status: Not on file     Intimate partner violence:     Fear of current or ex partner: Not on file     Emotionally abused: Not on file     Physically abused: Not on file     Forced sexual activity: Not on file   Other Topics Concern     Not on file   Social History Narrative    Notes per Dr Loredo 2015 (updated 10/31/2015):         HOME ENVIRONMENT:    - 2015: Lives with , 3 children (son  11, daughter  14, daughter  10/31/15), no pets, apt with stairs, in [place]        MARITAL HISTORY:    - culturally  to Eliu Cohen since         EDUCATION:    - High School Grad, [employer]     - CNA at [employer]     - [employer] x2 yrs \"generals\"        NATIVE LANGUAGE:    - HMONG = 1st language in home    - ENLISH fluent        HOBBIES/LEISURE ACTIVITIES:    - family time, music        PERSONAL HISTORY:    - Born in [place]. Family came to [place] , then moved to [place] . Family/parents originally from [place].        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     PHYSICAL EXAM    /78 (Patient Site: Left Arm, Patient Position: Sitting, Cuff Size: Adult Large)   Pulse 81   Temp 98.4  F (36.9  C) (Oral)   Resp 12   Wt (!) 237 lb (107.5 kg)  "  LMP 12/10/2019 (Exact Date)   SpO2 98%   BMI 38.25 kg/m    GEN:  29 y.o. female sitting comfortably in no apparent distress. Obese.   HEENT: EOMI, no scleral icterus, buccal mucosa moist, TMs with scarring and appearance of clear fluid at least on the right.   Neck: Supple without lymphadenopathy or thyromegally   CHEST/LUNG: No respiratory distress, good air flow to bases, CTAB   CV: RRR, S1, S2 normal; no murmurs, rubs or gallops. No edema.   ABD:  Soft, non-tender, non distended, no guarding,  No masses. NBS  SKIN: warm, dry, no rashes or lesions  NEURO: Gait normal, coordination intact. Hearing grossly intact. Did not have tuning fork.   PSYCH:  Mood and affect appropriate      At the end of the encounter, I discussed results, diagnosis, medications. Discussed red flags for immediate return to clinic/ER, as well as indications for follow up if no improvement. Patient and/or caregiver understood and agreed to plan. Patient was stable for discharge.      Maggy Guerrero

## 2021-06-06 NOTE — TELEPHONE ENCOUNTER
RN Assessment/Reason for Call:   Okay to leave Detailed Message  Mao calling in, teacher  Has coronvirus she was asked to stay home.  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   No symptoms.   Not pregnant.   Not high risk.     RN Action/Disposition:  Protocol recommends home care; if symptoms see Dr in 24 hrs.  Offered [email or web address]   Call back if worse symptoms  Discussed home care measures.  Agrees to plan.     Chanelle Decker RN    Care Connection Triage/med refill  3/10/2020  9:35 PM

## 2021-06-08 ENCOUNTER — AMBULATORY - HEALTHEAST (OUTPATIENT)
Dept: FAMILY MEDICINE | Facility: CLINIC | Age: 31
End: 2021-06-08

## 2021-06-08 DIAGNOSIS — B37.31 YEAST INFECTION OF THE VAGINA: ICD-10-CM

## 2021-06-08 LAB
BKR LAB AP ABNORMAL BLEEDING: NO
BKR LAB AP BIRTH CONTROL/HORMONES: NORMAL
BKR LAB AP CERVICAL APPEARANCE: NORMAL
BKR LAB AP GYN ADEQUACY: NORMAL
BKR LAB AP GYN INTERPRETATION: NORMAL
BKR LAB AP HPV REFLEX: NORMAL
BKR LAB AP LMP: NORMAL
BKR LAB AP PATIENT STATUS: NORMAL
BKR LAB AP PREVIOUS ABNORMAL: NORMAL
BKR LAB AP PREVIOUS NORMAL: 2019
HIGH RISK?: NO
PATH REPORT.COMMENTS IMP SPEC: NORMAL
RESULT FLAG (HE HISTORICAL CONVERSION): NORMAL

## 2021-06-09 ENCOUNTER — COMMUNICATION - HEALTHEAST (OUTPATIENT)
Dept: FAMILY MEDICINE | Facility: CLINIC | Age: 31
End: 2021-06-09

## 2021-06-11 NOTE — TELEPHONE ENCOUNTER
Caller is complaining of abdominal pain that is preventing caller from sleeping. Caller rates the abdominal pain 7/10 and denies any injuries. Pepto bismol taking for the abdominal pain> Caller states the pain is above the belly button under the right rib cage area. Caller is complaining of shortness of breath and upper back pain as well. Triage guidelines recommend to go to ED. Caller verbalized and understands directives.  COVID 19 Nurse Triage Plan/Patient Instructions    Please be aware that novel coronavirus (COVID-19) may be circulating in the community. If you develop symptoms such as fever, cough, or SOB or if you have concerns about the presence of another infection including coronavirus (COVID-19), please contact your health care provider or visit [email or web address].     Disposition/Instructions    ED Visit recommended. Follow protocol based instructions.      Bring Your Own Device:  Please also bring your smart device(s) (smart phones, tablets, laptops) and their charging cables for your personal use and to communicate with your care team during your visit.      Thank you for taking steps to prevent the spread of this virus.  o Limit your contact with others.  o Wear a simple mask to cover your cough.  o Wash your hands well and often.    Resources    [facility]: About COVID-19: [email or web address]    CDC: What to Do If You're Sick: www.cdc.gov/coronavirus/2019-ncov/about/steps-when-sick.html    CDC: Ending Home Isolation: www.cdc.gov/coronavirus/2019-ncov/hcp/disposition-in-home-patients.html     CDC: Caring for Someone: www.cdc.gov/coronavirus/2019-ncov/if-you-are-sick/care-for-someone.html     [facility]: Interim Guidance for Hospital Discharge to Home: www.health.[facility].[place]/diseases/coronavirus/hcp/hospdischarge.pdf    [facility] clinical trials (COVID-19 research studies): [email or web address]     Below are the COVID-19 hotlines at the [place]  Department of Health ([facility]). Interpreters are available.   o For health questions: Call 510-397-9482 or 1-952.745.4488 (7 a.m. to 7 p.m.)  o For questions about schools and childcare: Call 568-667-3864 or 1-901.558.9693 (7 a.m. to 7 p.m.)       Reason for Disposition    [1] Pain lasts > 10 minutes AND [2] difficulty breathing    Additional Information    Negative: Severe difficulty breathing (e.g., struggling for each breath, speaks in single words)    Negative: Shock suspected (e.g., cold/pale/clammy skin, too weak to stand, low BP, rapid pulse)    Negative: Difficult to awaken or acting confused (e.g., disoriented, slurred speech)    Negative: Passed out (i.e., lost consciousness, collapsed and was not responding)    Negative: Visible sweat on face or sweat dripping down face    Negative: Sounds like a life-threatening emergency to the triager    Negative: Followed an abdomen (stomach) injury    Negative: Chest pain    Negative: [1] SEVERE pain (e.g., excruciating) AND [2] present > 1 hour    Negative: [1] Pain lasts > 10 minutes AND [2] age > 50    Negative: [1] Pain lasts > 10 minutes AND [2] age > 40 AND [3] associated chest, arm, neck, upper back or jaw pain    Negative: [1] Pain lasts > 10 minutes AND [2] age > 35 AND [3] at least one cardiac risk factor (i.e., hypertension, diabetes, obesity, smoker or strong family history of heart disease)    Negative: [1] Pain lasts > 10 minutes AND [2] history of heart disease (i.e., heart attack, bypass surgery, angina, angioplasty, CHF; not just a heart murmur)    Protocols used: ABDOMINAL PAIN - UPPER-A-AH

## 2021-06-12 NOTE — PROGRESS NOTES
10/5/2020  Hospital discharge follow up call to pt, no answer.  Voice mailboxz is fulled.  Sent a [facility] number to call CHW.    CHW Next outreach: 10-7-20

## 2021-06-12 NOTE — ANESTHESIA PREPROCEDURE EVALUATION
Anesthesia Evaluation      Patient summary reviewed   No history of anesthetic complications     Airway   Mallampati: I  Neck ROM: full   Pulmonary - negative ROS and normal exam                          Cardiovascular - normal exam  Exercise tolerance: > or = 4 METS  Rhythm: regular  Rate: normal,         Neuro/Psych - negative ROS     Endo/Other    (+) obesity,      GI/Hepatic/Renal - negative ROS           Dental - normal exam                        Anesthesia Plan  Planned anesthetic: general endotracheal    ASA 2   Induction: intravenous   Anesthetic plan and risks discussed with: patient  Anesthesia plan special considerations: antiemetics,   Post-op plan: routine recovery

## 2021-06-12 NOTE — ANESTHESIA CARE TRANSFER NOTE
Last vitals:   Vitals:    10/02/20 1050   BP: 116/55   Pulse: 69   Resp: (!) 32   Temp: 36.2  C (97.1  F)   SpO2: 98%     Patient's level of consciousness is drowsy  Spontaneous respirations: yes  Maintains airway independently: yes  Dentition unchanged: yes  Oropharynx: oropharynx clear of all foreign objects    QCDR Measures:  ASA# 20 - Surgical Safety Checklist: WHO surgical safety checklist completed prior to induction    PQRS# 430 - Adult PONV Prevention: 4558F - Pt received => 2 anti-emetic agents (different classes) preop & intraop  ASA# 8 - Peds PONV Prevention: 4558F - Pt received => 2 anti-emetic agents (different classes) preop & intraop  PQRS# 424 - [place]-op Temp Management: 4559F - At least one body temp DOCUMENTED => 35.5C or 95.9F within required timeframe  PQRS# 426 - PACU Transfer Protocol: - Transfer of care checklist used  ASA# 14 - Acute Post-op Pain: ASA14B - Patient did NOT experience pain >= 7 out of 10

## 2021-06-12 NOTE — TELEPHONE ENCOUNTER
Post-Op Phone Call               Surgeon: Dr. Kimberley M.D.    Date of Surgery: 10/2/20  Discharge Date: 10/2/20    Date/Time Called:   Date: 10/7/2020 Time: 11:55 AM   Attempt: First    Notes:  Doing well no issues    Return to Work Plans?  Expected date: Back to work  Do you need anything from us in this regard? No     Post-op appointment made? No        Thank you for your time. Please do not hesitate to call us with any questions or concerns.    Call completed by: Jenn Conti

## 2021-06-12 NOTE — PROGRESS NOTES
"10/6/2020  TCM DISCHARGE FOLLOW UP CALL    \"Hi, my name is  AunSARAI  , and I am calling from  [facility].  I am calling to follow up and see how things are going for you after your recent hospitalization.      Tell me how you are doing now that you are home?\"   \"am doing good still have abdomin pain where the incision is. Not taking any pain medication.\"      Discharge Instructions     Do you understand your discharge instructions?  Pt. Response: \" am confused with discharge instruction  when am suppose to go back to work might need a letter or note for work.\"    CHW sent staff message to CCC RN to clarify discharge instructions.    \"Has an appointment with your primary care provider been scheduled?\"  No, not yet.  \"If yes, when is that appointment?   If no, date of appointment scheduled:    I can assist you to schedule that now.   CC refer to AVS to schedule when instructed.     Patient will call the clinic and schedule follow up with PCP tomorrow 10-7-20 and will also call to schedule follow up appt with the surgeon.    Medications    \"Did you have any medication changes?     no changes to medications.   Do you have any concerns with obtaining the medications or questions about your medications that you would like a RN to review with you?    NO  **If yes, escalate to CC RN responsible for patient's clinic or [facility] RN  Send an inbasket Epic message if RN is unavailable after call.   \"When you see the provider, I would recommend that you bring your medications with you.\"    Call Summary    \"What questions or concerns do you have about your recent visit and your follow-up care?\"             Can be addressed if scheduled with RN or SW either Care Coordination or if declining to triage for further questions.     \"If you have questions or things don't continue to improve, we encourage you contact us through the main clinic number [phone] (give number).  Even if the clinic is not open, triage nurses are " available 24/7 to help you.         I am with Clinic Care Coordination, and we are a team of nurses, social workers, community health workers, and financial resource workers. We work together with your primary doctor.   We are here to see if we can help you with a variety of things - from resources in the community such as food assistance, transportation, help in the home, equipment needs, assistance with medications, coordination of your appointments, help with any medical questions, and things like this.      We would have a nurse or  speak with you and together come up with goals to help you to improve your health and wellness and do the best to help you stay out of the hospital.      Patient declined CCC at this time.

## 2021-06-12 NOTE — TELEPHONE ENCOUNTER
10-6-20  Patient have questions about her discharge instructions and when she suppose to go back to work and that she might need a letter to go to work.   Need INF appt

## 2021-06-12 NOTE — PROGRESS NOTES
"Vijay Betancur is a 29 y.o. female who is being evaluated via a billable telephone visit.      The patient has been notified of following:     \"This telephone visit will be conducted via a call between you and your physician/provider. We have found that certain health care needs can be provided without the need for a physical exam.  This service lets us provide the care you need with a short phone conversation.  If a prescription is necessary we can send it directly to your pharmacy.  If lab work is needed we can place an order for that and you can then stop by our lab to have the test done at a later time.    Telephone visits are billed at different rates depending on your insurance coverage. During this emergency period, for some insurers they may be billed the same as an in-person visit.  Please reach out to your insurance provider with any questions.    If during the course of the call the physician/provider feels a telephone visit is not appropriate, you will not be charged for this service.\"    Patient has given verbal consent to a Telephone visit? Yes    What phone number would you like to be contacted at? [phone]    Patient would like to receive their AVS by AVS Preference: Mail a copy.    Additional provider notes:   Last   [facility] 2 year  Hospital Follow-up Visit:    Hospital/Nursing Home/IP Rehab Facility: [facility]  Date of Admission: 10/2/20  Date of Discharge: 10/3/20  Reason(s) for Admission: Acute cholecystitis, S/p cholecystectomy    Was your hospitalization related to COVID-19? No  Problems taking medications regularly:  None  Medication changes since discharge: None  Problems adhering to non-medication therapy:  None    Summary of hospitalization:  [facility] hospital discharge summary reviewed  Diagnostic Tests/Treatments reviewed.  Follow up needed: none  Other Healthcare Providers Involved in Patient s Care:         Surgical follow-up appointment - next week  Update " since discharge: improved.      Post Discharge Medication Reconciliation: discharge medications reconciled, continue medications without change.  Plan of care communicated with patient              Assessment/Plan:  1. Hospital discharge follow-up  I have reviewed available hospital records, consults, notes, discharge summary as well as imaging and lab results.  In addition I have reviewed her medication list and made any adjustments as indicated in orders.  No meds at discharge. No formal discharge summary. Appears discharged by surgeon.       2. S/P laparoscopic cholecystectomy  F/u surgeon next week. Answered all questions to patient satisfaction. Work note provided today. She is back to work today on light duty.         Phone call duration:  12 minutes    Maggy Guerrero, DO

## 2021-06-12 NOTE — ANESTHESIA POSTPROCEDURE EVALUATION
Patient: Vijay Her  Procedure(s):  CHOLECYSTECTOMY, LAPAROSCOPIC  Anesthesia type: general    Patient location: PACU  Last vitals:   Vitals Value Taken Time   /67 10/02/20 1130   Temp  10/02/20 1132   Pulse 64 10/02/20 1131   Resp 18 10/02/20 1131   SpO2 97 % 10/02/20 1131   Vitals shown include unvalidated device data.  Post vital signs: stable  Level of consciousness: awake and responds to simple questions  Post-anesthesia pain: pain controlled  Post-anesthesia nausea and vomiting: no  Pulmonary: unassisted, return to baseline  Cardiovascular: stable and blood pressure at baseline  Hydration: adequate  Anesthetic events: no    QCDR Measures:  ASA# 11 - [place]-op Cardiac Arrest: ASA11B - Patient did NOT experience unanticipated cardiac arrest  ASA# 12 - [place]-op Mortality Rate: ASA12B - Patient did NOT die  ASA# 13 - PACU Re-Intubation Rate: ASA13B - Patient did NOT require a new airway mgmt  ASA# 10 - Composite Anes Safety: ASA10A - No serious adverse event    Additional Notes:

## 2021-06-12 NOTE — PROGRESS NOTES
"Dear Dr. Greg Sanchez Md  [address] [place] [address]    Thank you for the opportunity to participate in the care of Mrs. Vijay Betancur.    She is a 26 y.o. female who comes to the clinic with a chief complaint of excessive daytime sleepiness that is been going on for more than 5 years.  She has been told by friends and family members that she has significant pauses in her breathing during sleep followed by loud snoring.  She also has woken up in the middle night gasping for air and choking.  Sometimes her breathing so difficult during sleep that she needs to set up in order to get some rest.  Her review of system is otherwise unremarkable.     Ideal Sleep-Wake Cycle(devoid of societal pressure):    Patient would try to initiate sleep at around 10 PM with a variable sleep latency. The patient would have 3-4 nocturnal awakening. Final wake up time is around 8-9 AM.      Past Medical History  Past Medical History:   Diagnosis Date     Gestational diabetes mellitus (GDM) 10/12/2016     Immune to varicella 14    - 14 prenatal: varicella IMMUNE     Menarche 16 y/o    Menarche 16 y/o, menses qmo, bleeding x7 days (heavier since birth of 2nd child)      (normal spontaneous vaginal delivery) 40-6 10/30/2015    - 10/31/15:  40w6d [facility], labor 2h57m (push 8m, CHRIS), no pain meds, no lac, EBL 175mL (recd pitocin 10mU IM). GBS neg. Female BW 7#14.3 oz, L 20\", HC 13.5\", apgars 9/9. formula-feeding         Postpartum depression     possible postpartum depression, mild     Supervision of high-risk pregnancy 3/19/2015     per Dr Loredo: FINAL EDC 10/25/15 by 19-5 u/s at [facility] on 6/5/15 c/w UNsure LMP mid 2015  Obesity, BMI >40 7/8/15 24-3: 1 hr , HgbA1C 5.1      Type B blood, Rh positive 14, 3/19/15    - 14 & 3/19/15 prenatal: blood type B POS, Ab screen negative     UTI in pregnancy 2014    - 14 prenatal 36+wk GA: UCx >100k eColi pan-sensitive - 6/10/15 " "prenatal 20-3 (walk in clinic): UCx >100k eColi (R amp/tetracycline/TMP-SMX), treated with nitrofurantoin - 2015 prenatal 24w3d: sxs resolved, Ucx NEG  - 2015 MyChart message: c/o strong odor back again, advised NTBS and check urine culture and if again has UTI then will need to keep her on prophylactic antibiotics for duration of pregnancy//sds * 7/23/15 UCx NEG         Past Surgical History  Past Surgical History:   Procedure Laterality Date     NO PAST SURGERIES          Meds  Current Outpatient Prescriptions   Medication Sig Dispense Refill     loratadine (CLARITIN) 10 mg tablet Take 1 tablet (10 mg total) by mouth daily. 90 tablet 3     Current Facility-Administered Medications   Medication Dose Route Frequency Provider Last Rate Last Dose     medroxyPROGESTERone injection 150 mg (DEPO-PROVERA)  150 mg Intramuscular Q3 Months Greg Sanchez MD   150 mg at 17 1045        Allergies  Review of patient's allergies indicates no known allergies.     Social History  Social History     Social History     Marital status: Single     Spouse name: Eliu Cohen     Number of children: 3     Years of education: [id number]     Occupational History           3/19/15: \"Works at [employer] as a CNA since 3/2015\"     Social History Main Topics     Smoking status: Never Smoker     Smokeless tobacco: Never Used     Alcohol use No      Comment: none     Drug use: No     Sexual activity: Yes     Partners: Male     Birth control/ protection: None     Other Topics Concern     Not on file     Social History Narrative    Notes per Dr Loredo 2015 (updated 10/31/2015):         HOME ENVIRONMENT:    - 2015: Lives with , 3 children (son  11, daughter  14, daughter  10/31/15), no pets, apt with stairs, in [facility]        MARITAL HISTORY:    - culturally  to Eliu Cohen since         EDUCATION:    - High School Grad, [employer] 2012    - CNA at  [employer]     - [employer] [employer] x2 yrs " "\"generals\"        NATIVE LANGUAGE:    - HMONG = 1st language in home    - ENLISH fluent        HOBBIES/LEISURE ACTIVITIES:    - family time, music        PERSONAL HISTORY:    - Born in [facility]. Family came to [facility] , then moved to [facility] . Family/parents originally from [facility] [facility] [place].        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, homemaker             Family History  Family History   Problem Relation Age of Onset     No Medical Problems Son       11     No Medical Problems Daughter       14     Stroke Paternal Grandfather 50     estimate 50s y/o     Hypertension Paternal Grandfather      Diabetes type II Paternal Grandmother      Pat Gma DM2, CAD     Heart disease Paternal Grandmother      Pat Gma DM2, CAD     Diabetes type II Paternal Uncle      insulin dependent DM2, obese     Gestational diabetes Cousin      GDM insulin dependent with 2nd pregnancy, obese     Diabetes type II Paternal Aunt      Cancer Neg Hx         Review of Systems:  Constitutional: Negative except as noted in HPI.   Eyes: Negative except as noted in HPI.   ENT: Negative except as noted in HPI.   Cardiovascular: Negative except as noted in HPI.   Respiratory: Negative except as noted in HPI.   Gastrointestinal: Negative except as noted in HPI.   Genitourinary: Negative except as noted in HPI.   Musculoskeletal: Negative except as noted in HPI.   Integumentary: Negative except as noted in HPI.   Neurological: Negative except as noted in HPI.   Psychiatric: Negative except as noted in HPI.   Endocrine: Negative except as noted in HPI.   Hematologic/Lymphatic: Negative except as noted in HPI.      STOP BANG 2017   Do you snore loudly (louder than talking or loud enough to be heard through closed doors)? 1   Do you often feel tired, fatigued, or sleepy during daytime? 1   Has anyone observed you stop breathing in your sleep? 1   Do you have or are " "you being treated for high blood pressure? 0   BMI more than 35 kg/m2 1   Age over 50 years old? 0   Neck circumference greater than 16 inches? 0   Gender male? 0   Total Score 4   Epworths Sleepiness Scale 8/31/2017   Sitting and reading 2   Watching TV 1   Sitting, inactive in a public place (e.g. a theatre or a meeting) 2   As a passenger in a car for an hour without a break 2   Lying down to rest in the afternoon when circumstances permit 2   Sitting and talking to someone 1   Sitting quietly after a lunch without alcohol 1   In a car, while stopped for a few minutes in traffic 0   Total score 11   Rooming 8/31/2017   Usual bedtime 1230-1   Awakenings 3-4   Energy Drinks 0   Coffee 0   Cola 0   Difficulty falling asleep No   Difficulty staying asleep Yes   Excessive daytime tiredness Yes   Excessive daytime sleepiness No   Dozing off while driving No   Shift Worker No   Sleep Walking? No   Sleep Talking? Yes   Kicking or punching? No   Restless legs symptoms No       Physical Exam:  Pulse 81  Ht 5' 5\" (1.651 m)  Wt (!) 276 lb 3.2 oz (125.3 kg)  SpO2 98%  BMI 45.96 kg/m2  BMI:Body mass index is 45.96 kg/(m^2).   GEN: NAD, morbidly obese  Head: Normocephalic.  EYES: PERRLA, EOMI  ENT: Oropharynx is clear, mallampatti class 4+ airway.   Nasal mucosa is moist without erythema  Neck : Thyroid is within normal limits. Neck circ 15.5\"  CV: Regular rate and rhythm, S1 & S2 positive.  LUNGS: Bilateral breathsounds heard.   ABDOMEN: Positive bowel sounds in all quadrants, soft, no rebound or guarding  MUSCULOSKELETAL: Bilateral trace leg swelling  SKIN: warm, dry, no rashes  Neurological: Alert, oriented to time, place, and person.  Psych: normal mood, normal affect     Labs/Studies:     Lab Results   Component Value Date    WBC 7.5 05/17/2016    HGB 11.3 (L) 10/12/2016    HCT 37.0 05/17/2016    MCV 86 05/17/2016     05/17/2016         Chemistry    No results found for: NA, K, CL, CO2, BUN, CREATININE, GLU No " results found for: CALCIUM, ALKPHOS, AST, ALT, BILITOT         Lab Results   Component Value Date    FERRITIN 53 08/02/2012     Lab Results   Component Value Date    TSH 1.2 08/02/2012         Assessment and Plan:  In summary Vijay Betancur is a 26 y.o. year old female here for sleep disturbance.  1.  Suspected sleep apnea   Mrs. Vijay Betancur has high risk for obstructive sleep apnea based on the history of witnessed apnea, snoring and a crowded airway. I educated the patient on the underlying pathophysiology of obstructive sleep apnea. We reviewed the risks associated with sleep apnea, including increased cardiovascular risk and overall death. We talked about treatments briefly. I recommend getting a Home sleep study. The patient should return to the clinic to discuss results and treatment option in a patient-centered approach.  2.  Hypersomnia  3.  Snoring  4.  Other sleep disturbance      Patient verbalized understanding of these issues, agrees with the plan and all questions were answered today. Patient was given an opportuntity to voice any other symptoms or concerns not listed above. Patient did not have any other symptoms or concerns.      Patient told to return in one week after the sleep study is interpreted. Patient instructed to stop at  to schedule appointment before leaving today.      Srini José DO  Board Certified in Internal Medicine and Sleep Medicine  [facility].    (Note created with Dragon voice recognition and unintended spelling errors and word substitutions may occur)

## 2021-06-12 NOTE — TELEPHONE ENCOUNTER
"Got a hold of patient, and she was talking gibemelyish that I couldn't understand her and all I heard was \"you should've left a message, someone  of Covid\" and she hung up. Completing task as she said it's not the right time to make an appointment and doesn't sound too happy. I wasn't able to explain to the patient that we were unable to leave a message on her phone. Will not attempt to call patient. She can call back when it's the right time for her. Thanks.  "
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
## 2021-06-13 NOTE — PROGRESS NOTES
Order for Durable Medical Equipment was processed and equipment ordered.   DME provider: Lawanda  Date Faxed: 10/19/2017  Ordering Provider: Dr. José  Equipment ordered: Cpap

## 2021-06-13 NOTE — PROGRESS NOTES
[facility] clinic EXAM note      Chief Complaint   Patient presents with     Vaginal Itching     started last thursday- light grayish in color- no odor       Assessment & Plan    Problem List Items Addressed This Visit     None      Visit Diagnoses     Vaginitis    -  Primary    Relevant Orders    Chlamydia trachomatis & Neisseria gonorrhoeae, Amplified Detection    Wet Prep, Vaginal        Update: Called patient with results.  Wet prep negative.  Discussed given her history and exam could treat for yeast at this time versus watching and waiting.  Patient states she is feeling better over the last couple days so is okay with waiting at this time.  We will follow-up at next appointment to be scheduled next week.  Gonorrhea /chlamydia pending.    History    Mao Her is a 26 y.o.  female who presents for the following issues:    Vaginal itching:  Patient is pregnant at 14 weeks 3 days.  Seen Dr. Campos for OB care.  She is complaining of vaginal itching ×1 week.  States she gets white discharge at baseline and has not noticed any change or unusual discharge.  Current discharge is still white.  She is not sure if there is an odor with it.  She denies any dysuria, frequency, urgency, fever or chills.  She tried a little bit of her sisters over-the-counter yeast cream.  But it said not to use if you have been diagnosed by .  So she stopped.  States did help some of the symptoms while she was using it.      mEDICATIONS    Current Outpatient Prescriptions on File Prior to Visit   Medication Sig Dispense Refill     loratadine (CLARITIN) 10 mg tablet Take 1 tablet (10 mg total) by mouth daily. 90 tablet 3     prenatal vit-iron fum-folic ac (PRENATAL VITAMIN) 27 mg iron- 0.8 mg Tab Take 1 tablet by mouth once daily. 100 tablet 3     No current facility-administered medications on file prior to visit.        Pertinent past medical, surgical, social and family history reviewed and updated in embraase.    Social History     Social  "History     Marital status: Single     Spouse name: Eliu Cohen     Number of children: 3     Years of education: [id number]     Occupational History           3/19/15: \"Works at [employer] as a CNA since 3/2015\"     Social History Main Topics     Smoking status: Never Smoker     Smokeless tobacco: Never Used     Alcohol use No      Comment: none     Drug use: No     Sexual activity: Yes     Partners: Male     Birth control/ protection: None     Other Topics Concern     Not on file     Social History Narrative    Notes per Dr Loredo 2015 (updated 10/31/2015):         HOME ENVIRONMENT:    - 2015: Lives with , 3 children (son  11, daughter  14, daughter  10/31/15), no pets, apt with stairs, in [facility]        MARITAL HISTORY:    - culturally  to Eliu Cohen since         EDUCATION:    - High School Grad, [employer]     - CNA at [employer]     - [employer] x2 yrs \"generals\"        NATIVE LANGUAGE:    - HMONG = 1st language in home    - ENLISH fluent        HOBBIES/LEISURE ACTIVITIES:    - family time, music        PERSONAL HISTORY:    - Born in [facility]. Family came to [facility] , then moved to [facility] . Family/parents originally from [place].        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     Physical Exam    /78 (Patient Site: Right Arm, Patient Position: Sitting, Cuff Size: Adult Large)  Pulse 76  Wt (!) 275 lb 9.6 oz (125 kg)  LMP 07/15/2017  BMI 45.86 kg/m2  GEN:  26 y.o. female sitting comfortably in no apparent distress.   HEENT: EOMI, no scleral icterus,   MSK:  Strength grossly normal  SKIN: warm, dry, no rashes or lesions  NEURO: Gait normal, coordination intact  PSYCH:  Mood and affect appropriate  Pelvic exam: normal external genitalia, vulva, vagina, " cervix.  There is some white cottage cheeselike discharge at the introitus, and whitish yellow discharge around the cervix            Maggy Guerrero

## 2021-06-13 NOTE — PROGRESS NOTES
"Dear Dr. Greg Sanchez MD  [address] [place] [address],    Thank you for the opportunity to participate in the care of Vijay Betancur.     She is a 26 y.o.  female patient who comes to the sleep medicine clinic for review of her sleep study. The study was completed on 17 which showed the the patient had moderate obstructive sleep apnea with an apnea hypopnea index of 18 events per hour with the lowest O2 sat of 79%.      Past Medical History:   Diagnosis Date     Gestational diabetes mellitus (GDM) 10/12/2016     Menarche 16 y/o    Menarche 16 y/o, menses qmo, bleeding x7 days (heavier since birth of 2nd child)      (normal spontaneous vaginal delivery) 2016      (normal spontaneous vaginal delivery) 40-6 10/30/2015    - 10/31/15:  40w6d [facility], labor 2h57m (push 8m, CHRIS), no pain meds, no lac, EBL 175mL (recd pitocin 10mU IM). GBS neg. Female BW 7#14.3 oz, L 20\", HC 13.5\", apgars 9/9. formula-feeding         Postpartum depression     possible postpartum depression, mild     UTI in pregnancy 2014    - 14 prenatal 36+wk GA: UCx >100k eColi pan-sensitive - 6/10/15 prenatal 20-3 (walk in clinic): UCx >100k eColi (R amp/tetracycline/TMP-SMX), treated with nitrofurantoin - 2015 prenatal 24w3d: sxs resolved, Ucx NEG  - 2015 MyChart message: c/o strong odor back again, advised NTBS and check urine culture and if again has UTI then will need to keep her on prophylactic antibiotics for duration of pregnancy//sds * 7/23/15 UCx NEG        Past Surgical History:   Procedure Laterality Date     NO PAST SURGERIES         Social History     Social History     Marital status: Single     Spouse name: Eliu Cohen     Number of children: 3     Years of education: [id number]     Occupational History           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\"     Social History Main Topics     Smoking status: Never Smoker     Smokeless tobacco: Never Used     Alcohol use No      " "Comment: none     Drug use: No     Sexual activity: Yes     Partners: Male     Birth control/ protection: None     Other Topics Concern     Not on file     Social History Narrative    Notes per Dr Loredo 2015 (updated 10/31/2015):         HOME ENVIRONMENT:    - 2015: Lives with , 3 children (son  11, daughter  14, daughter  10/31/15), no pets, apt with stairs, in [facility]        MARITAL HISTORY:    - culturally  to Eliu Cohen since         EDUCATION:    - High School Grad, [employer]     - CNA at [employer]     - [facility] [employer] [facility] [employer] x2 yrs \"generals\"        NATIVE LANGUAGE:    - HMONG = 1st language in home    - ENLISH fluent        HOBBIES/LEISURE ACTIVITIES:    - family time, music        PERSONAL HISTORY:    - Born in [facility]. Family came to [place] , then moved to [facility] . Family/parents originally from [place].        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 90 tablet 3     prenatal vit-iron fum-folic ac (PRENATAL VITAMIN) 27 mg iron- 0.8 mg Tab Take 1 tablet by mouth once daily. 100 tablet 3     No current facility-administered medications for this visit.        No Known Allergies    Physical Exam:  /74  Pulse (!) 106  Ht 5' 5\" (1.651 m)  Wt (!) 275 lb 8 oz (125 kg)  LMP 07/15/2017  SpO2 98%  BMI 45.85 kg/m2  BMI:Body mass index is 45.85 kg/(m^2).   GEN: NAD, obese  Psych: normal mood, normal affect     Labs/Studies:  - We reviewed the results of the overnight PSG as described on the HPI.     Lab Results   Component Value Date    WBC 8.4 2017    HGB 13.5 2017    HCT 40.0 2017    MCV 86 2017     2017         Chemistry    No results found for: NA, K, CL, CO2, BUN, " CREATININE, GLU No results found for: CALCIUM, ALKPHOS, AST, ALT, BILITOT         Lab Results   Component Value Date    FERRITIN 53 08/02/2012           Assessment and Plan:  In summary Mao Her is a 26 y.o. year old female here for review of her sleep study.  1. Obstructive Sleep Apnea  We had an extensive conversation to review the results of her sleep study and to  her on the importance of treating sleep apnea. We discussed treatment options including oral appliance versus CPAP. Patient decided to proceed with CPAP. She will start using the device as soon as she receives it with the intention to use if for the entire night. We discussed some tips to increase PAP tolerance as well as the normal curve of adaptation. CPAP is going to provide improved respiratory function during the night but it can cause some sleep disruption that tends to improve with continuous usage. She should return to the clinic in 6 weeks to review compliance and efficacy monitoring.  Patient would prefer to use [employer] as her durable medical equipment company.  2.  Hypersomnia  3.  Other sleep disturbance  4.  Obesity     Patient verbalized understanding of these issues, agrees with the plan and all questions were answered today. Patient was given an opportuntity to voice any other symptoms or concerns not listed above. Patient did not have any other symptoms or concerns.      Patient told to return in 6 weeks. Patient instructed to stop at  to schedule appointment before leaving today.    Srini José DO  Board Certified in Internal Medicine and Sleep Medicine  [facility].    We spent a total of 10 minutes of face-to-face encounter and more than 50% of the encounter was used for counseling or coordination of care.    (Note created with Dragon voice recognition and unintended spelling errors and word substitutions may occur)

## 2021-06-13 NOTE — PROGRESS NOTES
Assessment:     1. UTI (urinary tract infection)  nitrofurantoin, macrocrystal-monohydrate, (MACROBID) 100 MG capsule   2. Dysuria  Urinalysis-UC if Indicated    Culture, Urine          Plan:     We will treat UTI with a course of nitrofurantoin.  Urine culture is pending and will adjust her antibiotic based on the culture and sensitivities.  Recommend pushing fluids and follow-up if symptoms are getting worse or not improving.    Subjective:       26 y.o. female presents for evaluation of a 1 day history of a several hour history of increased urinary frequency, urgency, and dysuria.  She denies any fevers, chills, back pain, nausea, abdominal pain, hematuria, or abnormal vaginal discharge.  She is 15 weeks gestation.    The following portions of the patient's history were reviewed and updated as appropriate: allergies, current medications, past family history, past medical history, past social history, past surgical history and problem list.    Review of Systems  A 12 point comprehensive review of systems was negative except as noted.     Objective:      /80 (Patient Site: Right Arm, Patient Position: Sitting, Cuff Size: Adult Large)  Pulse 75  Temp 98.2  F (36.8  C) (Oral)   Resp 20  Wt (!) 273 lb 3 oz (123.9 kg)  LMP 07/15/2017  SpO2 99%  BMI 45.46 kg/m2  General appearance: alert, appears stated age and cooperative  Back: symmetric, no curvature. ROM normal. No CVA tenderness.  Abdomen:  Mild suprapubic tenderness noted.    Recent Results (from the past 24 hour(s))   Urinalysis, OB Screen (ketones, glucose, protein only)   Result Value Ref Range    Glucose, UA Negative Negative    Ketones, UA Negative Negative    Protein, UA Negative Negative mg/dL   Glycosylated Hemoglobin A1c   Result Value Ref Range    Hemoglobin A1c 6.1 (H) 3.5 - 6.0 %   Urinalysis-UC if Indicated   Result Value Ref Range    Color, UA Yellow Colorless, Yellow, Straw, Light Yellow    Clarity, UA Clear Clear    Glucose, UA  Negative Negative    Bilirubin, UA Negative Negative    Ketones, UA Negative Negative    Specific Gravity, UA >=1.030 1.005 - 1.030    Blood, UA Moderate (!) Negative    pH, UA 5.5 5.0 - 8.0    Protein,  mg/dL (!) Negative mg/dL    Urobilinogen, UA 0.2 E.U./dL 0.2 E.U./dL, 1.0 E.U./dL    Nitrite, UA Negative Negative    Leukocytes, UA Moderate (!) Negative           This note has been dictated using voice recognition software. Any grammatical or context distortions are unintentional and inherent to the software

## 2021-06-13 NOTE — PROGRESS NOTES
"Chief Complaint:  Chief Complaint   Patient presents with     Pregnancy Confirmation     pregnancy #5, LMP unknown     HPI:   Vijay Her is a 26 y.o. female is here for pregnancy intake.  She is .  Patient's last menstrual period was 07/15/2017.  She's had about 2 periods since her last delivery in 2016.  She's had nausea and vomiting for 4-8 weeks.  Positive home pregnancy test last week.    Past medical history: reviewed and updated.  Past Medical History:   Diagnosis Date     Gestational diabetes mellitus (GDM) 10/12/2016     Menarche 18 y/o    Menarche 18 y/o, menses qmo, bleeding x7 days (heavier since birth of 2nd child)      (normal spontaneous vaginal delivery) 2016      (normal spontaneous vaginal delivery) 40-6 10/30/2015    - 10/31/15:  40w6d [facility], labor 2h57m (push 8m, CHRIS), no pain meds, no lac, EBL 175mL (recd pitocin 10mU IM). GBS neg. Female BW 7#14.3 oz, L 20\", HC 13.5\", apgars 9/9. formula-feeding         Postpartum depression     possible postpartum depression, mild     UTI in pregnancy 2014    - 14 prenatal 36+wk GA: UCx >100k eColi pan-sensitive - 6/10/15 prenatal 20-3 (walk in clinic): UCx >100k eColi (R amp/tetracycline/TMP-SMX), treated with nitrofurantoin - 2015 prenatal 24w3d: sxs resolved, Ucx NEG  - 2015 MyChart message: c/o strong odor back again, advised NTBS and check urine culture and if again has UTI then will need to keep her on prophylactic antibiotics for duration of pregnancy//sds * 7/23/15 UCx NEG      Past Surgical History:   Procedure Laterality Date     NO PAST SURGERIES         Current Outpatient Prescriptions:      loratadine (CLARITIN) 10 mg tablet, Take 1 tablet (10 mg total) by mouth daily., Disp: 90 tablet, Rfl: 3     prenatal vit-iron fum-folic ac (PRENATAL VITAMIN) 27 mg iron- 0.8 mg Tab, Take 1 tablet by mouth once daily., Disp: 100 tablet, Rfl: 3    Social:  Social History   Substance Use Topics     Smoking " status: Never Smoker     Smokeless tobacco: Never Used     Alcohol use No      Comment: none       OBJECTIVE:  No Known Allergies  Vitals:    09/11/17 0954   BP: 108/76   Pulse: 72   Resp: 20     Body mass index is 46.1 kg/(m^2).    Vital signs stable as recorded above  General: Patient is alert and oriented x 3, in no apparent distress  Fetal Exam: Fundal height was not palpable, fetal heart tones are not heard.     Results:  Results for orders placed or performed in visit on 09/11/17   Culture, Urine   Result Value Ref Range    Culture Mixture of urogenital organisms    Pregnancy (Beta-hCG, Qual), Urine   Result Value Ref Range    Pregnancy Test, Urine Positive (!) Negative   Vitamin D, Total (25-Hydroxy)   Result Value Ref Range    Vitamin D, Total (25-Hydroxy) 15.2 (L) 30.0 - 80.0 ng/mL   Urinalysis, OB Screen   Result Value Ref Range    Glucose, UA Negative Negative    Ketones, UA Negative Negative    Protein, UA Negative Negative mg/dL   ABO/RH Typing (OP order)   Result Value Ref Range    HML ABO/Rh Typing B POS     HML ABO/Rh Repeat Typing B POS    Hepatitis B Surface antigen (HBsAG)   Result Value Ref Range    Hepatitis B Surface Ag Negative Negative   HIV Antigen/Antibody Screening Cascade   Result Value Ref Range    HIV Antigen / Antibody Negative Negative   HML Antibody Screen   Result Value Ref Range    HML Antibody Screen Negative Negative   RPR   Result Value Ref Range    Syphilis Screen Cascade Non-Reactive Non-Reactive   Rubella Immune Status (IgG)   Result Value Ref Range    Rubella IgG Immune Immune   Lead, Blood   Result Value Ref Range    Lead  <5.0 ug/dL    Collection Method Venous     Lead Retest No    Drugs of Abuse 1,Urine   Result Value Ref Range    Amphetamines Screen Negative Screen Negative    Benzodiazepines Screen Negative Screen Negative    Opiates Screen Negative Screen Negative    Phencyclidine Screen Negative Screen Negative    THC Screen Negative Screen Negative    Barbiturates  Screen Negative Screen Negative    Cocaine Metabolite Screen Negative Screen Negative    Oxycodone Screen Negative Screen Negative    Creatinine, Urine 116.9 mg/dL   HM1 (CBC with Diff)   Result Value Ref Range    WBC 8.4 4.0 - 11.0 thou/uL    RBC 4.68 3.80 - 5.40 mill/uL    Hemoglobin 13.5 12.0 - 16.0 g/dL    Hematocrit 40.0 35.0 - 47.0 %    MCV 86 80 - 100 fL    MCH 28.8 27.0 - 34.0 pg    MCHC 33.8 32.0 - 36.0 g/dL    RDW 12.5 11.0 - 14.5 %    Platelets 236 140 - 440 thou/uL    MPV 10.8 8.5 - 12.5 fL    Neutrophils % 68 50 - 70 %    Lymphocytes % 24 20 - 40 %    Monocytes % 5 2 - 10 %    Eosinophils % 3 0 - 6 %    Basophils % 0 0 - 2 %    Neutrophils Absolute 5.7 2.0 - 7.7 thou/uL    Lymphocytes Absolute 2.0 0.8 - 4.4 thou/uL    Monocytes Absolute 0.4 0.0 - 0.9 thou/uL    Eosinophils Absolute 0.2 0.0 - 0.4 thou/uL    Basophils Absolute 0.0 0.0 - 0.2 thou/uL   Lead With Demographics   Result Value Ref Range    Lead, B <1.0 0.0 - 4.9 mcg/dL    Venous/Capillary Venous     Patient Street Address [address] [place] [address]     Patient Zip Code [address]     Patient [facility]     Patient Home Phone [phone]     Patient Race      Patient Ethnicity Not      Patient Occupation NA     Patient Employer NA     Guardian First Name NA     Guardian Last Name      Health Care Provider Name [facility] Care Provider Street Address      Health Care Provider [facility] Care Provider [facility]     Health Care Provider Zip Code      Health Care Provider Phone [phone]     Submitting Laboratory Phone [phone]      Other screening pregnancy lab work is ordered and pending.    Patient scored a 9/30 on [place]  Depression Screen.    Assessment and Plan:  1. Pregnancy Intake Appointment.  Mao Her is 26 y.o. and .  Patient's last menstrual period was 07/15/2017.  Estimated Date of Delivery: 4/15/18  She will be seeing Dr. Campos for OB  care.  Screening pregnancy lab work was drawn.  Prenatal vitamins prescribed.  Problem list and current medications reviewed and updated.  Dating ultrasound ordered.  Prenatal info packet given.  History of postpartum depression 2011.    2. Gestational diabetes 2016.    Follow up in 4 weeks.  Please see OB Episode for complete details.  Visit was approximately 25 minutes, greater than 50% of time spent in face-to-face counseling and coordination of care.    This dictation uses voice recognition software, which may contain typographical errors.

## 2021-06-14 NOTE — PROGRESS NOTES
"No ctx, lof, bleeding, or discharge.  No HA or vision changes.  Good FM: Yes  Concerns today: States blood sugars have been good.  Did not bring her log with her today.  I did notice a message in the chart that she supposed to let Zita know tomorrow what her blood sugars have been.  The patient just states \" they have been good so I was not worried about it.\"  She is wondering about the growth of her baby with gestational diabetes and possibly needing an induction.  She is also curious about working around someone with active TB.  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  She is a little bit concerned about this.    Labs/imaging reviewed: Gestational diabetes    Plan today:   -20 week anatomy ultrasound ordered/growth ultrasound  -Discussed expectant management of gestational diabetes.  -We will plan to obtain QuantiFERON gold with a 28 week labs.    Maggy Guerrero    "

## 2021-06-16 PROBLEM — K37 APPENDICITIS: Status: ACTIVE | Noted: 2020-10-02

## 2021-06-16 PROBLEM — L83 ACANTHOSIS NIGRICANS: Status: ACTIVE | Noted: 2018-04-10

## 2021-06-16 PROBLEM — K81.0 ACUTE CHOLECYSTITIS: Status: ACTIVE | Noted: 2020-10-02

## 2021-06-16 NOTE — PROGRESS NOTES
No ctx, lof, bleeding, or discharge.  No HA or vision changes.  Good FM : yes!  Eating more and so her blood sugars are increased.  Sometimes she skips a meal and she is super hungry and then she overeats.  She is taking just the 2 units of insulin nightly.  Has not made any changes.  She did not bring a blood sugar log in with her today she is planning to send me her blood sugars at some point soon.  Had an epidural with her first baby natural at the others.  Her greatest concern today is the baby getting to big and needing a c/s. She is fixated on this.       Plan today:   Patient diagnosed with GDM based on hgba1c of 6.1 at 19 weeks. She saw diabetic educator Zita once and was started on 2 U of insulin with dinner but then never returned. Multiple phone calls and messages sent to patient. She now presents at 33 weeks and I have no idea what her Blood sugars are. At this point I discussed with patient will get an OB consult to discuss appropriate management, GDM risks and start  testing and get a growth US.   - she will send me her BS.   - tdap   - 28 wk labs  - f/u in 2 weeks. - GBS swab next visit.     Maggy Guerrero

## 2021-06-16 NOTE — PROGRESS NOTES
No ctx, lof, bleeding, or discharge.  No HA or vision changes.  Good FM: Yes  Did see [facility] for BPP last week.  Told everything looked okay with baby.  Not sure that she had a growth ultrasound and does not believe she had any sort of official consult with 1 of the OB/GYN doctors.  She is off her work schedule starting .  Will need FMLA paperwork and will get this to me.  Expecting to induce her on  for uncontrolled gestational diabetes.  She again does not bring any blood sugars for me to look at today.  She does state that they are all under 200.  She does have a question today about some itchy skin on her left hand around her thumb area.  She has been using her son's mometasone cream on it.  A little bit helpful but has not gotten rid of it.  She states she often gets itchy/dry hands during the winter months with having to frequently wash her hands at work.  Patient is again very concerned about needing a  for having a large baby.    Exam: patch of slightly erythematous dry/flaking skin on left hand at base of thumb    Plan today:   -Called [facility] to add on EFW to next BPP. I was hoping to have a growth ultrasound and a consult to OB/GYN since patient's been noncompliant with her blood sugars.  -GBS swab collected today.  -Discussed situation with diabetic educator Zita who suggested getting an A1c and glucose level on her at next visit.  -Reviewed risks again of uncontrolled blood sugars with gestational diabetes including macrosomia which is what she is concerned about as well as increase fetal mortality, hypoglycemia and labor dystocia.  Encouraged her to send me her blood sugars as soon as possible.  -Triamcinolone cream for eczema  -Follow-up in 1 week.    Maggy Guerrero

## 2021-06-16 NOTE — PROGRESS NOTES
No ctx, lof, bleeding, or discharge.  No HA or vision changes.  Good FM : Yes  Feeling a lot more pressure.  And had some mucusy discharge with some brown spots in it.  She messaged me that once again she only had a BPP yesterday with OB/GYN.  She remembered to bring her blood sugars in today!!!  Also sent them to me via my chart.    Plan today:  Gestational diabetes insulin controlled: Patient continues with weekly BPPs with [facility].  These have been 8 out of 8.  Blood sugars reviewed with clinic diabetic educator Zita today.  Fastings are under fairly good control.  Minimal lunch recordings so difficult to say.  1 hour post dinner blood sugars have been under fairly good control however in the last month does have some outliers to 270-80 on a couple occasions.  Otherwise around 140-160s if elevated.  Instructed patient to increase her dinner insulin to 4 units of NovoLog.  Will upload log in the chart.  A1c today 6.3.  Minimally increased from 6.1 earlier in the pregnancy on initial screening.  We will send for growth ultrasound today at [facility] as communication with [facility] has been difficult.  Following results will discuss with OB/GYN as indicated for induction.  Otherwise planning to induce at 39 weeks.  F/u in 1 week.       Maggy Guerrero

## 2021-06-17 NOTE — PATIENT INSTRUCTIONS - HE
"Patient Instructions by Lj Patel MD at 11/27/2019  2:50 PM     Author: Lj Patel MD Service: -- Author Type: Physician    Filed: 11/27/2019  3:27 PM Encounter Date: 11/27/2019 Status: Signed    : Lj Patel MD (Physician)         Patient Education     Bladder Infection, Female (Adult)    Urine is normally doesn't have any bacteria in it. But bacteria can get into the urinary tract from the skin around the rectum. Or they can travel in the blood from elsewhere in the body. Once they are in your urinary tract, they can cause infection in the urethra (urethritis), the bladder (cystitis), or the kidneys (pyelonephritis).  The most common place for an infection is in the bladder. This is called a bladder infection. This is one of the most common infections in women. Most bladder infections are easily treated. They are not serious unless the infection spreads to the kidney.  The phrases \"bladder infection,\" \"UTI,\" and \"cystitis\" are often used to describe the same thing. But they are not always the same. Cystitis is an inflammation of the bladder. The most common cause of cystitis is an infection.  Symptoms  The infection causes inflammation in the urethra and bladder. This causes many of the symptoms. The most common symptoms of a bladder infection are:    Pain or burning when urinating    Having to urinate more often than usual    Urgent need to urinate    Only a small amount of urine comes out    Blood in urine    Abdominal discomfort. This is usually in the lower abdomen above the pubic bone.    Cloudy urine    Strong- or bad-smelling urine    Unable to urinate (urinary retention)    Unable to hold urine in (urinary incontinence)    Fever    Loss of appetite    Confusion (in older adults)  Causes  Bladder infections are not contagious. You can't get one from someone else, from a toilet seat, or from sharing a bath.  The most common cause of bladder infections is bacteria from the " bowels. The bacteria get onto the skin around the opening of the urethra. From there, they can get into the urine and travel up to the bladder, causing inflammation and infection. This usually happens because of:    Wiping improperly after urinating. Always wipe from front to back.    Bowel incontinence    Pregnancy    Procedures such as having a catheter inserted    Older age    Not emptying your bladder. This can allow bacteria a chance to grow in your urine.    Dehydration    Constipation    Sex    Use of a diaphragm for birth control   Treatment  Bladder infections are diagnosed by a urine test. They are treated with antibiotics and usually clear up quickly without complications. Treatment helps prevent a more serious kidney infection.  Medicines  Medicines can help in the treatment of a bladder infection:    Take antibiotics until they are used up, even if you feel better. It is important to finish them to make sure the infection has cleared.    You can use acetaminophen or ibuprofen for pain, fever, or discomfort, unless another medicine was prescribed. If you have chronic liver or kidney disease, talk with your healthcare provider before using these medicines. Also talk with your provider if you've ever had a stomach ulcer or gastrointestinal bleeding, or are taking blood-thinner medicines.    If you are given phenazopydridine to reduce burning with urination, it will cause your urine to become a bright orange color. This can stain clothing.  Care and prevention  These self-care steps can help prevent future infections:    Drink plenty of fluids to prevent dehydration and flush out your bladder. Do this unless you must restrict fluids for other health reasons, or your doctor told you not to.    Proper cleaning after going to the bathroom is important. Wipe from front to back after using the toilet to prevent the spread of bacteria.    Urinate more often. Don't try to hold urine in for a long time.    Wear  loose-fitting clothes and cotton underwear. Avoid tight-fitting pants.    Improve your diet and prevent constipation. Eat more fresh fruit and vegetables, and fiber, and less junk and fatty foods.    Avoid sex until your symptoms are gone.    Avoid caffeine, alcohol, and spicy foods. These can irritate your bladder.    Urinate right after intercourse to flush out your bladder.    If you use birth control pills and have frequent bladder infections, discuss it with your doctor.  Follow-up care  Call your healthcare provider if all symptoms are not gone after 3 days of treatment. This is especially important if you have repeat infections.  If a culture was done, you will be told if your treatment needs to be changed. If directed, you can call to find out the results.  If X-rays were done, you will be told if the results will affect your treatment.  Call 911  Call 911 if any of the following occur:    Trouble breathing    Hard to wake up or confusion    Fainting or loss of consciousness    Rapid heart rate  When to seek medical advice  Call your healthcare provider right away if any of these occur:    Fever of 100.4 F (38.0 C) or higher, or as directed by your healthcare provider    Symptoms are not better by the third day of treatment    Back or belly (abdominal) pain that gets worse    Repeated vomiting, or unable to keep medicine down    Weakness or dizziness    Vaginal discharge    Pain, redness, or swelling in the outer vaginal area (labia)  Date Last Reviewed: 10/1/2016    2801-4038 [employer]. [address] [place] [address], [place] [address]. All rights reserved. This information is not intended as a substitute for professional medical care. Always follow your healthcare professional's instructions.                 Color consistent with ethnicity/race, warm, dry intact, resilient.

## 2021-06-17 NOTE — PROGRESS NOTES
No lof, bleeding, or discharge.  No HA or vision changes.  Good FM : yes  She was able to start the 4 units of insulin with dinner.  She states blood sugars have been good, she did not bring her glucometer today.    Labs/imaging reviewed: Had BPP 8 out of 8 with growth ultrasound showing estimated fetal weight of greater than 90 percentile equal to 3841 g and discordant growth of the abdominal circumference.    Plan today:   -Discussed with Dr. Ludwig at [facility] regarding patient's growth ultrasound.  Discussed nothing different needed at this time, can proceed with induction on Sunday.  -NST per below was reactive.  -SVE today was favorable.  -Plan for induction Sunday morning.         Fetal Non-Stress Test  [facility]  Performed on 4/6/2018 at 38w5d.    Indication: Insulin Dependent gestational diabetes      FHT:  Baseline: 150 bpm, Variability: Moderate (6 - 25 bpm), Accelerations: Present and Decelerations: Absent  CONTRACTIONS:  none    Assessment/Plan  Category 1, reactive and reassuring      Maggy Guerrero

## 2021-06-18 NOTE — PROGRESS NOTES
Post Partum Check/nexplanon insertion:    Delivery at 39w1d now .  Date of delivery: 4/10/18    Patient concerns: none    Bleeding: no concerns.  Bulk of the bleeding stopped after a couple weeks and has just been some light spotting since.    Pain: no concerns    LMP:Patient's last menstrual period was 07/15/2017.     Depression: no concerns    Contraception Plan:  Nexplanon    Immunizations needed:  none      Pap smear:  Due 2019    PHYSICAL EXAM  /72 (Patient Site: Left Arm, Patient Position: Sitting, Cuff Size: Adult Large)  Pulse 85  Temp 98  F (36.7  C) (Oral)   Resp 20  Wt (!) 277 lb 5 oz (125.8 kg)  LMP 07/15/2017  Breastfeeding? No  BMI 46.15 kg/m2  GENERAL APPEARANCE:  The patient is a pleasant, normal appearing  female with normal affect and in no distress.    PROCEDURE:   A point approximately 10 cm from the medial epicondyle on the upper inner left arm was identified and cleaned with alcohol. This was injected with 2.5cc of 1% Lidocaine with epi along the planned insertion canal. The site was then prepped with Betadyne. The Nexplanon insertion needle was removed from the sterile pack.  The Nexplanon was then inserted just underneath the surface of the skin in the recommended fashion.  Following removal of the insertion needle, the Nexplanon implant was palpated by both the patient and myself.   The insertion site was then covered with an adhesive bandage and then covered with a pressure bandage.  There were no complications and the patient tolerated the procedure well.        Assessment:  Well post partum check up.  Nexplanon insertion: Reviewed with patient all contraceptive options including:  OCP, Ortho Evra, Nuvaring, Depo Provera, IUD-both Mirena and ParaGard, nexplanon, condoms and diaphragm.  After hearing all of her options, the patient chose Nexplanon.  We discussed risks and benefits of nexmplanon  including:   the possible side effect of metrorrhagia with irregular  spotting or bleeding within the first 3-6 months after insertion as well as the 20% risk of amenorrhea.  Other minor side effects were discussed including:  headache (16 percent), weight gain (12 percent), acne (12 percent), breast tenderness (10 percent), emotional lability (6 percent) and abdominal pain (5 percent), pain at insertion site.  Informed consent was obtained.  Pregnancy test was negative.      PLAN:  The paperwork was filled out and the user care was given to the patient.    The patient was advised to call if she experienced any significant pain, redness or swelling at the incision site or over the mela.    Follow up prn Nexplanon check.  Patient to leave pressure dressing in place for 24h.  Patient to leave adhesive bandage in place for 3 days.  Bleeding precautions given, patient is to call for any heavy bleeding > 1 pad per hour, severe pain or fevers.  Back up contraception for 7 days.     Maggy Guerrero

## 2021-06-20 NOTE — LETTER
Letter by Maggy Guerrero DO at      Author: Maggy Guerrero DO Service: -- Author Type: --    Filed:  Encounter Date: 10/12/2020 Status: (Other)         October 12, 2020     Patient: Vijay Betancur   YOB: 1990   Date of Visit: 10/12/2020       To Whom It May Concern:    It is my medical opinion that Vijay Betancur may return to work on 10/9/20 on light duty (no lifting, or bending). Please excuse her from 10/2/20- 10/9/20.     If you have any questions or concerns, please don't hesitate to call.    Sincerely,        Electronically signed by Maggy Guerrero DO

## 2021-06-21 NOTE — PROGRESS NOTES
Assessment/Plan:     Patient presents to clinic with symptoms possibly consistent with a urinary tract infection, and possible right sided costovertebral angle tenderness.  She has no systemic symptoms, however, and her physical exam is completely benign without any evidence of CVA tenderness that I could appreciate.  Patient's urine was bright orange when sent to lab and returned with urine analysis consistent with possible urinary tract infection with leukocytes without nitrite.  Will treat with 3 days of Macrobid and have patient return if symptoms worsen.  Culture pending..    Problem List Items Addressed This Visit     None      Visit Diagnoses     Frequent urination    -  Primary    Relevant Medications    nitrofurantoin, macrocrystal-monohydrate, (MACROBID) 100 MG capsule    Other Relevant Orders    Urinalysis-UC if Indicated (Completed)    Culture, Urine          Return to clinic PRN.    Total time spent with patient was 15 minutes with greater than 50% spent in face-to-face counseling regarding the above plan.    Subjective:      Vijay Betancur is a 28 y.o. female who presents to clinic for possible UTI.    Patient endorses symptoms for the past 4 days.  The symptoms are improving 2/2 herbal medicine but not completely.  Patient endorses dysuria, resolved increased in frequency, increase in urgency. Pt does endorse right sided back pain for 2 days.  Patient denies and a sensation of incomplete voiding, increase in vaginal discharge, pain with intercourse, and systemic symptoms.        Past Medical History, Family History, and Social History reviewed.     Current Outpatient Medications on File Prior to Visit   Medication Sig Dispense Refill     docusate sodium (COLACE) 100 MG capsule Take 1 capsule (100 mg total) by mouth daily. 45 capsule 0     ibuprofen (ADVIL,MOTRIN) 800 MG tablet Take 1 tablet (800 mg total) by mouth every 8 (eight) hours. 90 tablet 0     loratadine (CLARITIN) 10 mg tablet   2      phenazopyridine (PYRIDIUM) 200 MG tablet Take 1 tablet (200 mg total) by mouth 3 (three) times a day as needed for pain. 20 tablet 0     traMADol (ULTRAM) 50 mg tablet Take 1-2 tablets ( mg total) by mouth every 6 (six) hours as needed for pain. 8 tablet 0     triamcinolone (KENALOG) 0.1 % cream Apply to area twice a day for 1 week. 30 g 0     witch hazel (HEMORRHOIDAL, WITCH HAZEL,) 50 % PadM Apply to perineum as needed. 10 each 0     No current facility-administered medications on file prior to visit.        Review of systems is as stated in HPI.  The remainder of the 10 system review is otherwise negative.    Objective:     /70 (Patient Site: Right Arm, Patient Position: Sitting, Cuff Size: Adult Large)   Pulse 96   Temp 98.1  F (36.7  C) (Oral)   Resp 17   Wt (!) 271 lb 12.8 oz (123.3 kg)   SpO2 97%   BMI 45.23 kg/m   Body mass index is 45.23 kg/m .  Gen: No apparent distress  : Deferred  MSK: No evidence of costovertebral angle tenderness  Abdomen: No significant suprapubic tenderness      This note has been dictated using voice recognition software. Any grammatical or context distortions are unintentional and inherent to the the software.     Rubi Carroll MD

## 2021-06-25 NOTE — TELEPHONE ENCOUNTER
Reason for Call:  Request for results:    Name of test or procedure: pap    Date of test of procedure: 06/04/21    Location of the test or procedure: [facility]    OK to leave the result message on voice mail or with a family member? Yes    Phone number Patient can be reached at: Home number on file [phone] (home)    Additional comments my chart showed results as yeast seen what should she do about that?:    Call taken on 6/4/2021 at 3:41 PM by Mae Humphries

## 2021-06-25 NOTE — PROGRESS NOTES
Progress Notes by Zita Solorio RD, CDE at 11/10/2017 12:00 PM     Author: Zita Solorio RD, CDE Service: -- Author Type: Diabetes Ed    Filed: 11/10/2017  1:08 PM Encounter Date: 11/10/2017 Status: Attested    : Zita Solorio RD, CDE (Diabetes Ed) Cosigner: Maggy Guerrero DO at 11/11/2017 12:01 PM    Attestation signed by Maggy Guerrero DO at 11/11/2017 12:01 PM    Reviewed and agree with assessment and plan.                [facility] GDM Care Plan    Assessment:  Follow up with Vijay today to assess bg and intake, purchased relion meter as suggested at last visit but has not been checking consistently. Did not bring meter today, brought in paper with 5 incomplete days of bg testing. Instructed on the importance of bg checks and consistency, difficult to assess insulin needs with out this. Bg noted below:    Date  FastingPC      PC  PC lunch   PC dinner  11.1                          113                   149  11.2   120/221  11.7    91                                           163  11.9    96                  165  11.10  94    Admits to eating too much rice at meals, 2-3 cups and eating dinner from cafe at work, has brought dinner a few times.  Has not been snacking.  Instructed on need for meal consistency with snacks, when we are controlling portions we need to snack between to maintain satiety, otherwise hunger increases as well as intake.  Vijay has understanding of nutrition needs as well as smbg but has not followed through. May need to consider meal time insulin however inconsistent bg checks make this challenging.  Follow up 1 week to assess bg and possible insulin start.     Visit Type: GDM Individual Follow-up  Time: 15  Visit #: 2  Provider:  Cesar  Provider's Diagnosis (per referral form): Gestational (648.83)  Weight:  278#  OGTT: No results found for this or any previous visit.   Estimated Date of Delivery: 4/15/18   Pregnancy #: 5  Previous GDM: Yes   Medications:   Current Outpatient  Prescriptions:   ?  loratadine (CLARITIN) 10 mg tablet, Take 1 tablet (10 mg total) by mouth daily., Disp: 90 tablet, Rfl: 3  ?  prenatal vit-iron fum-folic ac (PRENATAL VITAMIN) 27 mg iron- 0.8 mg Tab, Take 1 tablet by mouth once daily., Disp: 100 tablet, Rfl: 3    BG monitoring goals: Fasting <95; 1 hour post start of meal <140. Test 4 x per day.  Check fasting a.m. ketones: No  GDM meal pattern/carb counting taught per guidelines: Yes    Past Goals:  Nutrition: GDM meal plan somtimes  Activity: Walking after meals when able/staying active  met  Monitoring: BG 4x/day as directed, ketones every morning  no      New Goals:  Nutrition: GDM meal plan   Activity: Walking after meals when able/staying active   Monitoring: BG 4x/day as directed, ketones every morning    DIABETES CARE PLAN AND EDUCATION RECORD    Gestational Diabetes Disease Process/Preconception Care/Management During Pregnancy/Postpartum:Discussed    Meter (per above goals): Competent    Nutrition Management    Weight: Assessed and Discussed  Portions/Balance: Assessed and Discussed  Carb ID/Count: Assessed and Discussed  Label Reading: Assessed and Discussed  Menu Planning: Assessed and Discussed  Dining Out: Assessed and Discussed  Physical Activity: Assessed and Discussed    Acute Complications: Prevent, Detect, Treat:    Goal Setting and Problem Solving: Assessed and Discussed  Barriers: Assessed and Discussed  Psychosocial Adjustments: Assessed and Discussed

## 2021-06-25 NOTE — TELEPHONE ENCOUNTER
Pap results are not back yet.  I did send her a [employer] message regarding her other lab results.  Make Sure she saw it.

## 2021-06-25 NOTE — PROGRESS NOTES
Progress Notes by Zita Solorio RD, CDE at 10/31/2017  1:00 PM     Author: Zita Solorio RD, CDE Service: -- Author Type: Diabetes Ed    Filed: 10/31/2017  2:06 PM Encounter Date: 10/31/2017 Status: Attested    : Zita Solorio RD, CDE (Diabetes Ed) Cosigner: Maggy Guerrero DO at 10/31/2017  2:40 PM    Attestation signed by Maggy Guerrero DO at 10/31/2017  2:40 PM    Note reviewed.  Agree with plan and comanagement of this patient.                [facility] GDM Care Plan    Assessment and BG: Met with Mao today to instruct on basics of GDM, risks, complications, bg goals and when to check. Instructed on GDM meal plan, 3 meals with 3 snacks, keep rice to 1 cup at lunch and dinner 1/2 at breakfast.      Recall indicates three meals per day, mainly Asian meals of rice, meat and vege.  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 Breakfast and lunch at home, will bring snacks for afternoon, eat HS snack when she gets home.  Verbalized understanding of meal plan, when to check bg and bg goals. Instructed to call with 3-4 elevations.  CDE contact information provided as well as main diabetes care office number.     Bg at visit today 120 mg/dl 4 PC.  Patient has [employer] insurance which does not cover meter or strips unless patient is on medication. Instructed to purchase Relion prime meter from [employer], cost of meter $9.00, 50 strips for $9.00 and lancets 100 for approx $9.00.      Time: 60 min  Visit Type: GDM Group  Visit #: 1  Provider:  Cesar  Provider's Diagnosis (per referral form): Gestational (648.83)  Weight: (!) 278 lb 4.8 oz (126.2 kg)  OGTT: No results found for this or any previous visit.  Estimated Date of Delivery: 4/15/18   Pregnancy #: 5  Previous GDM: Yes  Medications:   Current Outpatient Prescriptions:   ?  loratadine (CLARITIN) 10 mg tablet, Take 1 tablet (10 mg total) by mouth daily.,  Disp: 90 tablet, Rfl: 3  ?  prenatal vit-iron fum-folic ac (PRENATAL VITAMIN) 27 mg iron- 0.8 mg Tab, Take 1 tablet by mouth once daily., Disp: 100 tablet, Rfl: 3  PNV: Yes  Hospital:  unsure  Meter:  Noval[employer] prime    BG monitoring goals: Fasting <95; 1 hour post start of meal <140. Test 4 x per day.  Check fasting a.m. ketones: Yes  GDM meal pattern/carb counting taught per guidelines: Yes    Goals: Nutrition: GDM meal plan  Activity:  Walking after meals when able/staying active  Monitoring:  BG 4x/day as directed, ketones every morning    DIABETES CARE PLAN AND EDUCATION RECORD    Gestational Diabetes Disease Process/Preconception Care/Management During Pregnancy/Postpartum:    Meter (per above goals): Discussed, Literature provided and Patient returned demonstration    Nutrition Management    Weight: Assessed, Discussed and Literature provided  Portions/Balance: Assessed, Discussed and Literature provided  Carb ID/Count: Assessed, Discussed and Literature provided  Label Reading: Assessed, Discussed and Literature provided  Menu Planning: Assessed, Discussed and Literature provided  Dining Out: Assessed, Discussed and Literature provided  Physical Activity: Discussed and Literature provided    Acute Complications: Prevent, Detect, Treat:    Goal Setting and Problem Solving: Assessed and Discussed  Barriers: Assessed and Discussed  Psychosocial Adjustments: Assessed and Discussed

## 2021-06-28 NOTE — PROGRESS NOTES
Progress Notes by Analia Laughlin AuD at 2/6/2020  9:00 AM     Author: Analia Laughlin AuD Service: -- Author Type: Audiologist    Filed: 2/6/2020  2:36 PM Encounter Date: 2/6/2020 Status: Signed    : Analia Laughlin AuD (Audiologist)       Audiology Report:    Referring Provider: Dr. Guerrero    History:  Mao Her is seen today for comprehensive hearing evaluation. She reports difficulty hearing since 2012. She is unsure if one ear is affected more than another. She reports she can hear that people are talking, but sometimes is unable to make out the exact words they are saying. She reports intermittent tinnitus and aural fullness, bilaterally.  She denies a history of otalgia, dizziness, history of noise exposure and previous hearing test.    Results:     Left Ear Right Ear   Otoscopy clear canals clear canals   Pure Tone Audiometry normal hearing sensitivity 250-8000 Hz   normal hearing sensitivity 250-8000 Hz   Word Recognition excellent excellent   Tympanometry hypercompliant (Type Ad)  normal (Type A)   Acoustic Reflex (1kHz) Could not maintain seal Could not maintain seal     Transducer: Insert earphones and Circumaural headphones    Reliability was good  and there was good  SRT to PTA agreement.       Plan:  Results are discussed in detail.  She should return for retesting with concerns.  The patient is counseled on listening strategies. The patient is counseled on tinnitus.    Kiesha Jeffers, CCC-A  [place] Licensed Audiologist #[id number]

## 2021-07-03 NOTE — ADDENDUM NOTE
Addendum Note by Maggy Guerrero DO at 3/26/2018  1:57 PM     Author: Maggy Guerrero DO Service: -- Author Type: Physician    Filed: 3/26/2018  1:57 PM Encounter Date: 3/14/2018 Status: Signed    : Maggy Guerrero DO (Physician)    Addended by: MAGGY GUERRERO on: 3/26/2018 01:57 PM        Modules accepted: Orders

## 2021-07-04 NOTE — LETTER
Letter by Cristy Null RN at      Author: Cristy Null RN Service: -- Author Type: --    Filed:  Encounter Date: 6/9/2021 Status: (Other)         Vijay Betancur  [address]   [place] [address]             June 9, 2021         Dear MsLiban Betancur,    We are happy to inform you that your recent Pap smear is normal.    It is recommended that you have your next Pap smear in 3 years. You will also need to return to the clinic every year for an annual wellness visit.    If you have additional questions regarding this result, please contact our office and we will be happy to assist you.      Sincerely,    Your [facility] Care Team

## 2021-07-06 VITALS
WEIGHT: 256 LBS | RESPIRATION RATE: 18 BRPM | DIASTOLIC BLOOD PRESSURE: 80 MMHG | SYSTOLIC BLOOD PRESSURE: 117 MMHG | HEART RATE: 77 BPM | TEMPERATURE: 97.7 F | BODY MASS INDEX: 41.14 KG/M2 | HEIGHT: 66 IN

## 2021-07-14 PROBLEM — Z34.90 PREGNANT: Status: RESOLVED | Noted: 2018-04-08 | Resolved: 2019-06-30

## 2021-08-02 ENCOUNTER — OFFICE VISIT (OUTPATIENT)
Dept: FAMILY MEDICINE | Facility: CLINIC | Age: 31
End: 2021-08-02
Payer: COMMERCIAL

## 2021-08-02 VITALS
WEIGHT: 267 LBS | DIASTOLIC BLOOD PRESSURE: 78 MMHG | TEMPERATURE: 98.2 F | OXYGEN SATURATION: 99 % | BODY MASS INDEX: 43.09 KG/M2 | SYSTOLIC BLOOD PRESSURE: 113 MMHG | HEART RATE: 89 BPM

## 2021-08-02 DIAGNOSIS — O21.9 NAUSEA AND VOMITING IN PREGNANCY: ICD-10-CM

## 2021-08-02 DIAGNOSIS — R73.03 PREDIABETES: ICD-10-CM

## 2021-08-02 DIAGNOSIS — Z34.81 ENCOUNTER FOR SUPERVISION OF OTHER NORMAL PREGNANCY IN FIRST TRIMESTER: ICD-10-CM

## 2021-08-02 DIAGNOSIS — Z86.32 HISTORY OF GESTATIONAL DIABETES IN PRIOR PREGNANCY, CURRENTLY PREGNANT IN FIRST TRIMESTER: ICD-10-CM

## 2021-08-02 DIAGNOSIS — O99.211 OBESITY AFFECTING PREGNANCY IN FIRST TRIMESTER: ICD-10-CM

## 2021-08-02 DIAGNOSIS — Z32.01 PREGNANCY CONFIRMED BY POSITIVE URINE TEST: Primary | ICD-10-CM

## 2021-08-02 DIAGNOSIS — O09.291 HISTORY OF GESTATIONAL DIABETES IN PRIOR PREGNANCY, CURRENTLY PREGNANT IN FIRST TRIMESTER: ICD-10-CM

## 2021-08-02 LAB
ABO/RH(D): NORMAL
AMPHETAMINES UR QL SCN: NORMAL
ANTIBODY SCREEN: NEGATIVE
BARBITURATES UR QL: NORMAL
BENZODIAZ UR QL: NORMAL
CANNABINOIDS UR QL SCN: NORMAL
COCAINE UR QL: NORMAL
CREAT UR-MCNC: 127 MG/DL
ERYTHROCYTE [DISTWIDTH] IN BLOOD BY AUTOMATED COUNT: 11.8 % (ref 10–15)
HBA1C MFR BLD: 6.1 % (ref 0–5.6)
HCG UR QL: POSITIVE
HCT VFR BLD AUTO: 38.9 % (ref 35–47)
HGB BLD-MCNC: 13 G/DL (ref 11.7–15.7)
HIV 1+2 AB+HIV1 P24 AG SERPL QL IA: NEGATIVE
MCH RBC QN AUTO: 28.6 PG (ref 26.5–33)
MCHC RBC AUTO-ENTMCNC: 33.4 G/DL (ref 31.5–36.5)
MCV RBC AUTO: 86 FL (ref 78–100)
METHADONE UR QL SCN: NORMAL
OPIATES UR QL SCN: NORMAL
OXYCODONE UR QL: NORMAL
PCP UR QL SCN: NORMAL
PLATELET # BLD AUTO: 223 10E3/UL (ref 150–450)
RBC # BLD AUTO: 4.54 10E6/UL (ref 3.8–5.2)
SPECIMEN EXPIRATION DATE: NORMAL
WBC # BLD AUTO: 8.8 10E3/UL (ref 4–11)

## 2021-08-02 PROCEDURE — 99214 OFFICE O/P EST MOD 30 MIN: CPT | Performed by: FAMILY MEDICINE

## 2021-08-02 PROCEDURE — 87340 HEPATITIS B SURFACE AG IA: CPT | Performed by: FAMILY MEDICINE

## 2021-08-02 PROCEDURE — 86900 BLOOD TYPING SEROLOGIC ABO: CPT | Performed by: FAMILY MEDICINE

## 2021-08-02 PROCEDURE — 87086 URINE CULTURE/COLONY COUNT: CPT | Performed by: FAMILY MEDICINE

## 2021-08-02 PROCEDURE — 86780 TREPONEMA PALLIDUM: CPT | Performed by: FAMILY MEDICINE

## 2021-08-02 PROCEDURE — 87389 HIV-1 AG W/HIV-1&-2 AB AG IA: CPT | Performed by: FAMILY MEDICINE

## 2021-08-02 PROCEDURE — 86901 BLOOD TYPING SEROLOGIC RH(D): CPT | Performed by: FAMILY MEDICINE

## 2021-08-02 PROCEDURE — 86762 RUBELLA ANTIBODY: CPT | Performed by: FAMILY MEDICINE

## 2021-08-02 PROCEDURE — 80307 DRUG TEST PRSMV CHEM ANLYZR: CPT | Performed by: FAMILY MEDICINE

## 2021-08-02 PROCEDURE — 99000 SPECIMEN HANDLING OFFICE-LAB: CPT | Performed by: FAMILY MEDICINE

## 2021-08-02 PROCEDURE — 85027 COMPLETE CBC AUTOMATED: CPT | Performed by: FAMILY MEDICINE

## 2021-08-02 PROCEDURE — 86850 RBC ANTIBODY SCREEN: CPT | Performed by: FAMILY MEDICINE

## 2021-08-02 PROCEDURE — 81025 URINE PREGNANCY TEST: CPT | Performed by: FAMILY MEDICINE

## 2021-08-02 PROCEDURE — 83655 ASSAY OF LEAD: CPT | Mod: 90 | Performed by: FAMILY MEDICINE

## 2021-08-02 PROCEDURE — 87491 CHLMYD TRACH DNA AMP PROBE: CPT | Performed by: FAMILY MEDICINE

## 2021-08-02 PROCEDURE — 83036 HEMOGLOBIN GLYCOSYLATED A1C: CPT | Performed by: FAMILY MEDICINE

## 2021-08-02 PROCEDURE — 36415 COLL VENOUS BLD VENIPUNCTURE: CPT | Performed by: FAMILY MEDICINE

## 2021-08-02 PROCEDURE — 87591 N.GONORRHOEAE DNA AMP PROB: CPT | Performed by: FAMILY MEDICINE

## 2021-08-02 RX ORDER — PNV NO.95/FERROUS FUM/FOLIC AC 28MG-0.8MG
1 TABLET ORAL DAILY
Qty: 30 CAPSULE | Refills: 12 | Status: SHIPPED | OUTPATIENT
Start: 2021-08-02 | End: 2021-10-20

## 2021-08-02 RX ORDER — PYRIDOXINE HCL (VITAMIN B6) 25 MG
TABLET ORAL
Qty: 60 TABLET | Refills: 2 | Status: SHIPPED | OUTPATIENT
Start: 2021-08-02 | End: 2021-10-20

## 2021-08-03 LAB
BACTERIA UR CULT: NORMAL
C TRACH DNA SPEC QL NAA+PROBE: NEGATIVE
HBV SURFACE AG SERPL QL IA: NONREACTIVE
N GONORRHOEA DNA SPEC QL NAA+PROBE: NEGATIVE
RUBV IGG SERPL QL IA: POSITIVE
T PALLIDUM AB SER QL: NEGATIVE

## 2021-08-04 ENCOUNTER — HOSPITAL ENCOUNTER (OUTPATIENT)
Dept: ULTRASOUND IMAGING | Facility: HOSPITAL | Age: 31
Discharge: HOME | End: 2021-08-04
Attending: FAMILY MEDICINE | Admitting: FAMILY MEDICINE
Payer: COMMERCIAL

## 2021-08-04 DIAGNOSIS — Z34.81 ENCOUNTER FOR SUPERVISION OF OTHER NORMAL PREGNANCY IN FIRST TRIMESTER: ICD-10-CM

## 2021-08-04 PROCEDURE — 76801 OB US < 14 WKS SINGLE FETUS: CPT

## 2021-08-05 LAB — LEAD BLDV-MCNC: <2 UG/DL

## 2021-08-05 NOTE — PROGRESS NOTES
Vijay was seen today for pregnancy confirmation.    Diagnoses and all orders for this visit:    Pregnancy confirmed by positive urine test  -     HCG Qual, Urine ([id number])  -     Lead Venous Blood Confirm  -     Hemoglobin A1c  -     ABO/Rh type and screen  -     Hepatitis B surface antigen  -     CBC with platelets  -     HIV Antigen Antibody Combo  -     Rubella Antibody IgG Quantitative  -     Treponema Abs w Reflex to RPR and Titer  -     Urine Culture Aerobic Bacterial  -     Chlamydia trachomatis PCR  -     Neisseria gonorrhoeae PCR  -     Urine Drugs of Abuse Screen Panel 1+ - Drug Screen plus Methadone    Encounter for supervision of other normal pregnancy in first trimester  -     Prenatal MV-Min-Fe Fum-FA-DHA (PRENATAL MULTIVITAMIN PLUS DHA) 27-0.8-250 MG CAPS; Take 1 tablet by mouth daily  -     Cancel: US OB < 14 weeks, single,  for dating ([id number]); Future  -     AMB Adult Diabetes Educator Referral; Future    Nausea and vomiting in pregnancy: Overall stable well-appearing today.  Will start vitamin B6 and Unisom  -     pyridOXINE (VITAMIN  B-6) 25 MG tablet; 1 tablet every 6-8 hours  -     doxylamine (UNISOM) 25 MG TABS tablet; Take 1 tablet (25 mg) by mouth At Bedtime    Obesity affecting pregnancy in first trimester: Previously counseled on obesity in pregnancy.  High risk.  With morbid obesity.  History of gestational diabetes as below  Prediabetes back now.  Will contact her to discuss folic acid supplementation.  Also will recommend level 2 ultrasound for anatomy screen    Prediabetes: Given her prediabetes and new A1c of 6.1 as well as her history of gestational diabetes previous pregnancy will recommend seeing diabetic educator to start checking her blood sugars.  -     AMB Adult Diabetes Educator Referral; Future    History of gestational diabetes in prior pregnancy, currently pregnant in first trimester          Subjective   Vijay is a 30 year old who presents for the following health issues      HPI   We removed patient Nexplanon June 4.  She is surprised how quickly she got pregnant.   was surprised as well.  We met at the end of June to review her recent lab work at her annual physical regarding prediabetes with an A1c of 5.9. patient was going to come back to put in another Nexplanon to avoid getting pregnant due to the prediabetes after our discussion but she was already pregnant.  She is worried about her prediabetes.  She is wondering about congenital heart defects and we reviewed that usually with actual diabetes not prediabetes.  She would like dating ultrasound  LMP June 13.  Pregnancy test positive today  She reports nausea and vomiting and requesting something for meds     Review of Systems   Constitutional, HEENT, cardiovascular, pulmonary, gi and gu systems are negative, except as otherwise noted.      Objective    /78 (BP Location: Right arm, Patient Position: Sitting, Cuff Size: Adult Regular)   Pulse 89   Temp 98.2  F (36.8  C) (Temporal)   Wt 121.1 kg (267 lb)   LMP 06/16/2021   SpO2 99%   Breastfeeding No   BMI 43.09 kg/m    Body mass index is 43.09 kg/m .  Physical Exam   Deferred

## 2021-08-07 PROBLEM — O21.9 NAUSEA AND VOMITING IN PREGNANCY: Status: ACTIVE | Noted: 2021-08-07

## 2021-08-20 ENCOUNTER — PATIENT OUTREACH (OUTPATIENT)
Dept: EDUCATION SERVICES | Facility: CLINIC | Age: 31
End: 2021-08-20
Payer: COMMERCIAL

## 2021-08-20 DIAGNOSIS — Z34.81 ENCOUNTER FOR SUPERVISION OF OTHER NORMAL PREGNANCY IN FIRST TRIMESTER: ICD-10-CM

## 2021-08-20 DIAGNOSIS — Z86.32 HISTORY OF GESTATIONAL DIABETES IN PRIOR PREGNANCY, CURRENTLY PREGNANT IN FIRST TRIMESTER: ICD-10-CM

## 2021-08-20 DIAGNOSIS — R73.03 PREDIABETES: ICD-10-CM

## 2021-08-20 DIAGNOSIS — O09.291 HISTORY OF GESTATIONAL DIABETES IN PRIOR PREGNANCY, CURRENTLY PREGNANT IN FIRST TRIMESTER: ICD-10-CM

## 2021-08-20 PROCEDURE — 97802 MEDICAL NUTRITION INDIV IN: CPT

## 2021-08-20 RX ORDER — BLOOD PRESSURE TEST KIT
1 KIT MISCELLANEOUS 4 TIMES DAILY
Qty: 100 EACH | Refills: 4 | Status: SHIPPED | OUTPATIENT
Start: 2021-08-20 | End: 2021-12-20

## 2021-08-20 RX ORDER — LANCETS
EACH MISCELLANEOUS
Qty: 102 EACH | Refills: 11 | Status: SHIPPED | OUTPATIENT
Start: 2021-08-20 | End: 2021-12-20

## 2021-08-20 RX ORDER — BLOOD-GLUCOSE METER
1 EACH MISCELLANEOUS 4 TIMES DAILY
Qty: 1 KIT | Refills: 0 | Status: SHIPPED | OUTPATIENT
Start: 2021-08-20 | End: 2021-12-20

## 2021-08-20 RX ORDER — BLOOD SUGAR DIAGNOSTIC
STRIP MISCELLANEOUS
Qty: 200 STRIP | Refills: 11 | Status: SHIPPED | OUTPATIENT
Start: 2021-08-20 | End: 2021-12-20

## 2021-08-20 NOTE — PROGRESS NOTES
Type of Service: Telephone Visit/ 30 minutes    How would patient like to obtain AVS? [place]

## 2021-08-20 NOTE — PROGRESS NOTES
Type of Service: Telephone Visit/ 30 minutes    How would patient like to obtain AVS? [place]     Diabetes Self-Management Education & Support    Presents for:  Initial education for pre diabetes/pregnancy    SUBJECTIVE/OBJECTIVE:  Accompanied by: Self  Cultural Influences/Ethnic Background:  Not  or       Diabetes Symptoms & Complications          Patient Problem List and Family Medical History reviewed for relevant medical history, current medical status, and diabetes risk factors.    Vitals:  LMP 06/13/2021     Gestational weeks: 9 w 5 d  Estimated Date of Delivery: Mar 20, 2022    Labs:  Lab Results   Component Value Date    A1C 6.1 08/02/2021     Lab Results   Component Value Date     06/04/2021     Lab Results   Component Value Date    LDL 92 06/04/2021     Direct Measure HDL   Date Value Ref Range Status   06/04/2021 41 (L) >=50 mg/dL Final   ]  GFR Estimate   Date Value Ref Range Status   06/04/2021 >60 >60 mL/min/1.73m2 Final     GFR Estimate If Black   Date Value Ref Range Status   06/04/2021 >60 >60 mL/min/1.73m2 Final     Lab Results   Component Value Date    CR 0.69 06/04/2021     No results found for: MICROALBUMIN    Last 3 BP:   BP Readings from Last 3 Encounters:   08/02/21 113/78   06/04/21 117/80   01/27/20 100/78       History   Smoking Status     Never Smoker   Smokeless Tobacco     Never Used       Healthy Eating  Meal planning/habits: None  How many times a week on average do you eat food made away from home (restaurant/take-out)?: 2 (patient eats out at [employer] or [employer])  Meals include: Breakfast, Lunch, Dinner  Breakfast: rice, meat, vegetables  Lunch: rice, meat, vegetables  Dinner: rice, meat, vegetables  Snacks: pastries, like croissants, other baked goods  Other: patient stated that she has not been eating healthy lately due to fatigue and not buying healthy food for home.  Beverages: Water, Soda, Juice      Being Active  Exercise:: Currently not  exercising  Barrier to exercise: Other (patient feeling fatigued, so she is not exercising)    Monitoring  Blood Glucose Meter: Accu-chek  Supplies ordered 8/20/21      Checking for urine ketones?   Supplies ordered 8/20/21    Taking Medications  NA for diabetes    Any changes to above medications since becoming pregnant: NA  Problem Solving                 Reducing Risks       Healthy Coping  Emotional response to diabetes: Unable to access    Patient Activation Measure Survey Score:  No flowsheet data found.      INTERVENTION:  Patient presented for pre diabetes/pregnancy    Healthy Eating: carbohydrate counting, consistency in amount, composition, and timing of food intake and portion control  Being Active: describe appropriate activity program  Monitoring: log and interpret results, individual blood glucose targets, frequency of monitoring and ketone testing    Referral to care team: NA      ASSESSMENT:        Education provided today on:  AADE Self-Care Behaviors:  Healthy Eating: carbohydrate counting, eating out, portion control and label reading  Being Active: describe appropriate activity program  Monitoring: log and interpret results, individual blood glucose targets, frequency of monitoring and ketone testing  Taking Medication: NA    Opportunities for ongoing education and support in diabetes-self management were discussed.    Pt verbalized understanding of concepts discussed and recommendations provided today.       Education Materials Provided:  [facility] GDM booklet, plate guide, CHO counting       PLAN:  See Patient Instructions for co-developed, patient-stated behavior change goals.  AVS printed and provided to patient today. See Follow-Up section for recommended follow-up.      Time Spent: 30 minutes  Encounter Type: Individual    Any diabetes medication dose changes were made via the CDE Protocol and Collaborative Practice Agreement with the patient's OB/GYN provider. A copy of this encounter was shared  with the provider.      FOLLOW-UP:  Follow up with diabetes educator in 1 week.

## 2021-08-24 ENCOUNTER — TRANSFERRED RECORDS (OUTPATIENT)
Dept: HEALTH INFORMATION MANAGEMENT | Facility: CLINIC | Age: 31
End: 2021-08-24

## 2021-08-27 ENCOUNTER — TELEPHONE (OUTPATIENT)
Dept: EDUCATION SERVICES | Facility: CLINIC | Age: 31
End: 2021-08-27

## 2021-08-27 NOTE — TELEPHONE ENCOUNTER
I reached out to Vijay x 2 for her telephone follow up visit today.  She did not answer.  I left a voice message asking her to call back to reschedule.  Our diabetes team will also reach out to Vijay to reschedule.    Arabella Chavez

## 2021-08-31 ENCOUNTER — OFFICE VISIT (OUTPATIENT)
Dept: FAMILY MEDICINE | Facility: CLINIC | Age: 31
End: 2021-08-31
Payer: COMMERCIAL

## 2021-08-31 VITALS
HEIGHT: 66 IN | WEIGHT: 273 LBS | TEMPERATURE: 97.4 F | BODY MASS INDEX: 43.87 KG/M2 | HEART RATE: 97 BPM | SYSTOLIC BLOOD PRESSURE: 127 MMHG | DIASTOLIC BLOOD PRESSURE: 85 MMHG

## 2021-08-31 DIAGNOSIS — Z34.81 ENCOUNTER FOR SUPERVISION OF OTHER NORMAL PREGNANCY IN FIRST TRIMESTER: Primary | ICD-10-CM

## 2021-08-31 DIAGNOSIS — R93.89 ABNORMAL ULTRASOUND: ICD-10-CM

## 2021-08-31 DIAGNOSIS — O99.211 OBESITY AFFECTING PREGNANCY IN FIRST TRIMESTER: ICD-10-CM

## 2021-08-31 DIAGNOSIS — J30.1 SEASONAL ALLERGIC RHINITIS DUE TO POLLEN: ICD-10-CM

## 2021-08-31 DIAGNOSIS — R73.03 PREDIABETES: ICD-10-CM

## 2021-08-31 DIAGNOSIS — O21.9 NAUSEA AND VOMITING IN PREGNANCY: ICD-10-CM

## 2021-08-31 DIAGNOSIS — Z86.32 HISTORY OF GESTATIONAL DIABETES IN PRIOR PREGNANCY, CURRENTLY PREGNANT IN FIRST TRIMESTER: ICD-10-CM

## 2021-08-31 DIAGNOSIS — O09.291 HISTORY OF GESTATIONAL DIABETES IN PRIOR PREGNANCY, CURRENTLY PREGNANT IN FIRST TRIMESTER: ICD-10-CM

## 2021-08-31 PROCEDURE — 99214 OFFICE O/P EST MOD 30 MIN: CPT | Performed by: FAMILY MEDICINE

## 2021-08-31 RX ORDER — FLUTICASONE PROPIONATE 50 MCG
1 SPRAY, SUSPENSION (ML) NASAL DAILY
Qty: 16 G | Refills: 1 | Status: SHIPPED | OUTPATIENT
Start: 2021-08-31 | End: 2021-12-20

## 2021-08-31 RX ORDER — FOLIC ACID 1 MG/1
5 TABLET ORAL DAILY
Qty: 450 TABLET | Refills: 0 | Status: SHIPPED | OUTPATIENT
Start: 2021-08-31 | End: 2021-11-29

## 2021-08-31 ASSESSMENT — MIFFLIN-ST. JEOR: SCORE: 1982.32

## 2021-08-31 NOTE — PROGRESS NOTES
First OB    Improved nausea and vomiting  No pelvic pain, vaginal bleeding, discharge  No other concerns    Been feeling sick. Allergies ? No fever. Just a cough plus the runny nose  Cough- covid negative, takign robitussin. dayquil - said to stop  Started on 2 weeks ago  OTC- zyrtec?  At night- not coughing as much. In the AM - phelgm in the morning.   Did buy a nose spray.   Mom been diagnosed with stage 4 cervical cancer.   Mother in law- health issues. Had endometrial biospy and not sure   Hard for her to focus on her self , excercise and eating well    Taking PNV: no- tried to delivery it. Had co-pay     Labs/imaging reviewed  Discussed screening for aneuploidy and neural tube defects, SMA and CF.  Would like 1st trimester screen     Preformed physical exam : see flow sheet  Reviewed PORSCHE, past medical history, past surgical history, family history, genetic history, and previous obstetrical history.   Encouraged good nutrition, well balanced diet, regular activity.  Discussed foods to avoid.  And other applicable safety/health risks in pregnancy.    Vijay was seen today for follow up.    Diagnoses and all orders for this visit:    Encounter for supervision of other normal pregnancy in first trimester: Referral to OB/GYN for first trimester screening and to follow-up on the abnormal dating ultrasound to evaluate  this small cystic-like structure inferior to the gestational sac.  -     REVIEW OF HEALTH MAINTENANCE PROTOCOL ORDERS  -     Ob/Gyn Referral; Future    Abnormal ultrasound: As above  -     Ob/Gyn Referral; Future    Prediabetes: Following with diabetic education to work on diet and checking her blood sugars.  We will plan to check A1c next visit to see how she is doing.  Plan for 1 early 1 hour GTT    Obesity affecting pregnancy in first trimester: Discussed recommendation for 5 mg of folic acid daily and she will try.  I am not sure that will be covered by insurance.  -     folic acid (FOLVITE) 1 MG  tablet; Take 5 tablets (5 mg) by mouth daily    History of gestational diabetes in prior pregnancy, currently pregnant in first trimester    Seasonal allergic rhinitis due to pollen: She does not appear to be sick with a virus at this point.  Lungs clear overall well-appearing.  I think she is continuing with allergies which she normally gets this time a year.  We will add Flonase onto her regimen discussed limiting to 10 to 14 days.  Continue Zyrtec  -     fluticasone (FLONASE) 50 MCG/ACT nasal spray; Spray 1 spray into both nostrils daily    Nausea and vomiting in pregnancy: Improved not taking meds right now.      Preeclampsia risk factors:    High risk factors (1+): none    Moderate risk factors (2+): obesity    Folic acid: prescribed    Maggy Guerrero, DO

## 2021-09-01 ENCOUNTER — TELEPHONE (OUTPATIENT)
Dept: EDUCATION SERVICES | Facility: CLINIC | Age: 31
End: 2021-09-01

## 2021-09-01 NOTE — TELEPHONE ENCOUNTER
I reached out to Vijay toussaint 2 for her 8:30 telephone GDM follow up today.  She did not answer.  I left two voice messages for her and asked her to call back to reschedule.  Our Diabetes care team will also reach out to her.  I did speak with Dr. Guerrero, so she is aware of the situation.     Arabella Chavez

## 2021-09-02 ENCOUNTER — TELEPHONE (OUTPATIENT)
Dept: EDUCATION SERVICES | Facility: CLINIC | Age: 31
End: 2021-09-02

## 2021-09-02 NOTE — TELEPHONE ENCOUNTER
----- Message from Arabella Chavez RD sent at 9/1/2021  8:41 AM CDT -----  Regarding: GDM follow up visit  Would you please reach out to Vijay to reschedule her GDM visit that she missed today.    Thank you,    Arabella

## 2021-09-14 ENCOUNTER — TRANSFERRED RECORDS (OUTPATIENT)
Dept: HEALTH INFORMATION MANAGEMENT | Facility: CLINIC | Age: 31
End: 2021-09-14

## 2021-09-16 ENCOUNTER — TRANSFERRED RECORDS (OUTPATIENT)
Dept: HEALTH INFORMATION MANAGEMENT | Facility: CLINIC | Age: 31
End: 2021-09-16

## 2021-10-11 ENCOUNTER — HEALTH MAINTENANCE LETTER (OUTPATIENT)
Age: 31
End: 2021-10-11

## 2021-10-11 ENCOUNTER — TELEPHONE (OUTPATIENT)
Dept: FAMILY MEDICINE | Facility: CLINIC | Age: 31
End: 2021-10-11

## 2021-10-20 ENCOUNTER — PRENATAL OFFICE VISIT (OUTPATIENT)
Dept: FAMILY MEDICINE | Facility: CLINIC | Age: 31
End: 2021-10-20
Payer: COMMERCIAL

## 2021-10-20 VITALS
WEIGHT: 274 LBS | HEART RATE: 97 BPM | RESPIRATION RATE: 20 BRPM | SYSTOLIC BLOOD PRESSURE: 102 MMHG | BODY MASS INDEX: 44.04 KG/M2 | DIASTOLIC BLOOD PRESSURE: 73 MMHG

## 2021-10-20 DIAGNOSIS — Z34.81 ENCOUNTER FOR SUPERVISION OF OTHER NORMAL PREGNANCY IN FIRST TRIMESTER: Primary | ICD-10-CM

## 2021-10-20 PROBLEM — K81.0 ACUTE CHOLECYSTITIS: Status: RESOLVED | Noted: 2020-10-02 | Resolved: 2021-10-20

## 2021-10-20 PROBLEM — O21.9 NAUSEA AND VOMITING IN PREGNANCY: Status: RESOLVED | Noted: 2021-08-07 | Resolved: 2021-10-20

## 2021-10-20 PROCEDURE — 90471 IMMUNIZATION ADMIN: CPT | Performed by: FAMILY MEDICINE

## 2021-10-20 PROCEDURE — 90686 IIV4 VACC NO PRSV 0.5 ML IM: CPT | Performed by: FAMILY MEDICINE

## 2021-10-20 PROCEDURE — 99207 PR PRENATAL VISIT: CPT | Performed by: FAMILY MEDICINE

## 2021-10-20 RX ORDER — PNV NO.95/FERROUS FUM/FOLIC AC 28MG-0.8MG
1 TABLET ORAL DAILY
Qty: 90 CAPSULE | Refills: 3 | Status: SHIPPED | OUTPATIENT
Start: 2021-10-20 | End: 2021-12-20

## 2021-10-20 NOTE — PROGRESS NOTES
Subjective:  30 year old  at 18w3d  No ctx, lof, bleeding, or dc.  No HA or vision changes.  Has not appreciated fetal movement today.    First OB visit with Dr. King on .    Today we discussed a cog recommendations for weight gain in the 10 to 20 pound range for her current BMI.  Thinks weight at start of pregnancy was      Outpatient Medications Prior to Visit   Medication Sig Dispense Refill     ACCU-CHEK GUIDE test strip Use to test blood sugar 4 times daily or as directed. 200 strip 11     acetone urine (KETOSTIX) test strip Use to test urine once daily in the morning before eating or drinking. 50 strip 4     Alcohol Swabs PADS 1 each 4 times daily 100 each 4     blood glucose monitoring (ACCU-CHEK FASTCLIX) lancets Use to test blood sugar 4 times daily or as directed. 102 each 11     Blood Glucose Monitoring Suppl (ACCU-CHEK GUIDE ME) w/Device KIT 1 each 4 times daily 1 kit 0     fluticasone (FLONASE) 50 MCG/ACT nasal spray Spray 1 spray into both nostrils daily 16 g 1     folic acid (FOLVITE) 1 MG tablet Take 5 tablets (5 mg) by mouth daily 450 tablet 0     doxylamine (UNISOM) 25 MG TABS tablet Take 1 tablet (25 mg) by mouth At Bedtime 45 tablet 1     Prenatal MV-Min-Fe Fum-FA-DHA (PRENATAL MULTIVITAMIN PLUS DHA) 27-0.8-250 MG CAPS Take 1 tablet by mouth daily 30 capsule 12     pyridOXINE (VITAMIN  B-6) 25 MG tablet 1 tablet every 6-8 hours 60 tablet 2     No facility-administered medications prior to visit.      History   Smoking Status     Never Smoker   Smokeless Tobacco     Never Used      Objective:  /73 (BP Location: Left arm, Patient Position: Sitting, Cuff Size: Thigh)   Pulse 97   Resp 20   Wt 124.3 kg (274 lb)   LMP 2021   BMI 44.04 kg/m    GENERAL: alert, not distressed  CHEST: clear, no rales, rhonchi, or wheezes  CARDIAC: regular without murmur, gallop, or rub  ABDOMEN: gravid, soft, non tender, non distended, normal bowel sounds    No results found for this or  any previous visit (from the past 24 hour(s)).   Assessment and Plan:   1. Encounter for supervision of normal pregnancy in second trimester  Ok for fetal survey next week.  - Urinalysis, OB Screen    2. Encounter for supervision of other normal pregnancy in first trimester  Restart vitamins.  - Prenatal MV-Min-Fe Fum-FA-DHA (PRENATAL MULTIVITAMIN PLUS DHA) 27-0.8-250 MG CAPS; Take 1 tablet by mouth daily  Dispense: 90 capsule; Refill: 3  - US OB >14 Weeks, Complete Single (Fetal Survey) (WCB135); Future     3. With hx of GDM, recommended she start checking sugars.  Start with fastings and target <95.  Spot check one hour post meals.    Return in about 1 month (around 11/20/2021) for Follow up.      Future Appointments   Date Time Provider Department Center   10/27/2021 10:30 AM LEE  [facility]   11/23/2021 10:20 AM Maggy Guerrero DO [facility]   12/21/2021 10:20 AM Maggy Guerrero DO [facility]

## 2021-10-27 ENCOUNTER — HOSPITAL ENCOUNTER (OUTPATIENT)
Dept: ULTRASOUND IMAGING | Facility: HOSPITAL | Age: 31
Discharge: HOME | End: 2021-10-27
Attending: FAMILY MEDICINE | Admitting: FAMILY MEDICINE
Payer: COMMERCIAL

## 2021-10-27 DIAGNOSIS — Z34.81 ENCOUNTER FOR SUPERVISION OF OTHER NORMAL PREGNANCY IN FIRST TRIMESTER: ICD-10-CM

## 2021-10-27 PROCEDURE — 76805 OB US >/= 14 WKS SNGL FETUS: CPT

## 2021-11-04 ENCOUNTER — PRE VISIT (OUTPATIENT)
Dept: MATERNAL FETAL MEDICINE | Facility: HOSPITAL | Age: 31
End: 2021-11-04

## 2021-11-23 ENCOUNTER — PRENATAL OFFICE VISIT (OUTPATIENT)
Dept: FAMILY MEDICINE | Facility: CLINIC | Age: 31
End: 2021-11-23
Payer: COMMERCIAL

## 2021-11-23 VITALS
WEIGHT: 275 LBS | RESPIRATION RATE: 18 BRPM | SYSTOLIC BLOOD PRESSURE: 110 MMHG | TEMPERATURE: 98.1 F | BODY MASS INDEX: 44.2 KG/M2 | DIASTOLIC BLOOD PRESSURE: 72 MMHG | HEART RATE: 84 BPM

## 2021-11-23 DIAGNOSIS — Z86.32 HISTORY OF GESTATIONAL DIABETES IN PRIOR PREGNANCY, CURRENTLY PREGNANT IN SECOND TRIMESTER: ICD-10-CM

## 2021-11-23 DIAGNOSIS — O09.292 HISTORY OF GESTATIONAL DIABETES IN PRIOR PREGNANCY, CURRENTLY PREGNANT IN SECOND TRIMESTER: ICD-10-CM

## 2021-11-23 DIAGNOSIS — R93.89 ABNORMAL ULTRASOUND: ICD-10-CM

## 2021-11-23 DIAGNOSIS — O99.212 OBESITY AFFECTING PREGNANCY IN SECOND TRIMESTER: ICD-10-CM

## 2021-11-23 DIAGNOSIS — R73.03 PREDIABETES: ICD-10-CM

## 2021-11-23 DIAGNOSIS — O09.92 SUPERVISION OF HIGH RISK PREGNANCY IN SECOND TRIMESTER: Primary | ICD-10-CM

## 2021-11-23 LAB — HBA1C MFR BLD: 5.5 % (ref 0–5.6)

## 2021-11-23 PROCEDURE — 83036 HEMOGLOBIN GLYCOSYLATED A1C: CPT | Performed by: FAMILY MEDICINE

## 2021-11-23 PROCEDURE — 99207 PR PRENATAL VISIT: CPT | Performed by: FAMILY MEDICINE

## 2021-11-23 PROCEDURE — 36415 COLL VENOUS BLD VENIPUNCTURE: CPT | Performed by: FAMILY MEDICINE

## 2021-11-23 NOTE — PROGRESS NOTES
No ctx, lof, bleeding, or discharge.  No HA or vision changes.  Good FM : yes  Sperm analysis? Its another girl. Wants a boy.     Exam:   See flowsheet  GEN:  31 year old female sitting comfortably in no apparent distress.   HEENT:  no scleral icterus, buccal mucosa moist  CHEST/LUNG: No respiratory distress, unlabored breathing  ABD:  Soft, non-tender, Gravid uterus  PSYCH:  Mood and affect appropriate    Mao was seen today for prenatal care.    Diagnoses and all orders for this visit:    Supervision of high risk pregnancy in second trimester    History of gestational diabetes in prior pregnancy, currently pregnant in second trimester  -     Hemoglobin A1c; Future  -     Hemoglobin A1c    Abnormal ultrasound: Anatomy ultrasound limited by body habitus and fetal position.  She was referred to [facility] for ultrasound but she was confused what it was for.  She does have the phone number and will call them back to schedule.    Prediabetes  Comments:  has not been checking her BS. has not followed up with DE. unable to do 1 hr GTT today and will do next visit. but if a1c increasing today likely just need to restart checking her sugars.  Orders:  -     Hemoglobin A1c; Future  -     Hemoglobin A1c    Obesity affecting pregnancy in second trimester: Reviewed recommended weight gain 15 to 19 pounds.  She is at 8 pounds currently.  Reviewed  testing starting at 36 weeks.  Can offer induction at 39 weeks.  She wishes to have natural labor course as much as possible.

## 2021-12-08 ENCOUNTER — ANCILLARY PROCEDURE (OUTPATIENT)
Dept: ULTRASOUND IMAGING | Facility: HOSPITAL | Age: 31
End: 2021-12-08
Attending: FAMILY MEDICINE
Payer: COMMERCIAL

## 2021-12-08 ENCOUNTER — OFFICE VISIT (OUTPATIENT)
Dept: MATERNAL FETAL MEDICINE | Facility: HOSPITAL | Age: 31
End: 2021-12-08
Attending: FAMILY MEDICINE
Payer: COMMERCIAL

## 2021-12-08 DIAGNOSIS — O99.211 OBESITY AFFECTING PREGNANCY IN FIRST TRIMESTER: Primary | ICD-10-CM

## 2021-12-08 DIAGNOSIS — O26.90 PREGNANCY RELATED CONDITION, ANTEPARTUM: ICD-10-CM

## 2021-12-08 PROCEDURE — 76811 OB US DETAILED SNGL FETUS: CPT | Mod: 26 | Performed by: OBSTETRICS & GYNECOLOGY

## 2021-12-08 PROCEDURE — 76811 OB US DETAILED SNGL FETUS: CPT

## 2021-12-08 PROCEDURE — 99207 PR NO CHARGE LOS: CPT | Performed by: OBSTETRICS & GYNECOLOGY

## 2021-12-08 NOTE — PROGRESS NOTES
"Please see \"Imaging\" tab under \"Chart Review\" for details of today's visit.    Alejandra Prather    "

## 2021-12-13 ENCOUNTER — APPOINTMENT (OUTPATIENT)
Dept: URGENT CARE | Facility: CLINIC | Age: 31
End: 2021-12-13
Payer: MEDICAID

## 2021-12-13 ENCOUNTER — NURSE TRIAGE (OUTPATIENT)
Dept: NURSING | Facility: CLINIC | Age: 31
End: 2021-12-13
Payer: MEDICAID

## 2021-12-14 ENCOUNTER — OFFICE VISIT (OUTPATIENT)
Dept: FAMILY MEDICINE | Facility: CLINIC | Age: 31
End: 2021-12-14
Payer: COMMERCIAL

## 2021-12-14 VITALS
SYSTOLIC BLOOD PRESSURE: 133 MMHG | BODY MASS INDEX: 45.64 KG/M2 | RESPIRATION RATE: 18 BRPM | HEART RATE: 99 BPM | WEIGHT: 284 LBS | OXYGEN SATURATION: 98 % | DIASTOLIC BLOOD PRESSURE: 83 MMHG | TEMPERATURE: 98.6 F

## 2021-12-14 DIAGNOSIS — N89.8 VAGINAL DISCHARGE: ICD-10-CM

## 2021-12-14 DIAGNOSIS — B37.31 YEAST INFECTION OF THE VAGINA: ICD-10-CM

## 2021-12-14 DIAGNOSIS — Z33.1 PREGNANT STATE, INCIDENTAL: ICD-10-CM

## 2021-12-14 LAB
CLUE CELLS: ABNORMAL
TRICHOMONAS, WET PREP: ABNORMAL
WBC'S/HIGH POWER FIELD, WET PREP: ABNORMAL
YEAST, WET PREP: PRESENT

## 2021-12-14 PROCEDURE — 99213 OFFICE O/P EST LOW 20 MIN: CPT | Performed by: PHYSICIAN ASSISTANT

## 2021-12-14 PROCEDURE — 87210 SMEAR WET MOUNT SALINE/INK: CPT | Performed by: PHYSICIAN ASSISTANT

## 2021-12-14 NOTE — TELEPHONE ENCOUNTER
Patient calling reporting she has discomfort on her vulva/vaginal area. States the pain is moderate. Reports some four smelling earlier today. Denies fever or abdominal pain. Patient is 28w1d pregnant. Per guideline, advised patient to be seen within 2-3. Caller verbalized understanding. Denies further questions.      Cedrick Armenta RN  [facility]     COVID 19 Nurse Triage Plan/Patient Instructions    Please be aware that novel coronavirus (COVID-19) may be circulating in the community. If you develop symptoms such as fever, cough, or SOB or if you have concerns about the presence of another infection including coronavirus (COVID-19), please contact your health care provider or visit [email or web address].     Disposition/Instructions    In-Person Visit with provider recommended. Reference Visit Selection Guide.    Thank you for taking steps to prevent the spread of this virus.  o Limit your contact with others.  o Wear a simple mask to cover your cough.  o Wash your hands well and often.    Resources    [facility]: About COVID-19: [email or web address]    CDC: What to Do If You're Sick: www.cdc.gov/coronavirus/2019-ncov/about/steps-when-sick.html    CDC: Ending Home Isolation: www.cdc.gov/coronavirus/2019-ncov/hcp/disposition-in-home-patients.html     CDC: Caring for Someone: www.cdc.gov/coronavirus/2019-ncov/if-you-are-sick/care-for-someone.html     [facility]: Interim Guidance for Hospital Discharge to Home: www.health.[facility].[place].us/diseases/coronavirus/hcp/hospdischarge.pdf    [facility] clinical trials (COVID-19 research studies): [email or web address].[email or web address]-clinical-trials     Below are the COVID-19 hotlines at the [place] Department of Health ([facility]). Interpreters are available.   o For health questions: Call 700-384-0142 or 1-263.568.8619 (7 a.m. to 7 p.m.)  o For questions about schools and childcare: Call 323-396-0187 or 1-136.601.3399 (7 a.m. to 7 p.m.)  "                      Additional Information    Negative: [1] Pregnant 23 or more weeks AND [2] baby is moving less today (e.g., kick count < 5 in 1 hour or < 10 in 2 hours)    Negative: Patient sounds very sick or weak to the triager    Negative: SEVERE pain (e.g., excruciating)    Negative: MODERATE-SEVERE itching (e.g., interferes with school, work, or sleep)    Negative: Rash with painful tiny water blisters    Negative: [1] Rash (e.g., redness, tiny bumps, sore) of genital area AND [2] present > 24 hours    Negative: Tender lump (swelling or \"ball\") at vaginal opening    Negative: Abnormal color vaginal discharge (i.e., yellow, green, gray)    Bad smelling vaginal discharge    Protocols used: PREGNANCY - VULVAR SYMPTOMS-A-AH      "

## 2021-12-14 NOTE — PROGRESS NOTES
Patient presents with:  vaginal pain and swelling x yesterday  Rectall Pain x since yesterday  also pregnant      Clinical Decision Making:  Patient had a positive yeast on her vaginitis panel.  Patient is pregnant and is treated with intravaginal Monistat.  Does have a history of gestational diabetes.  She has had some vulvar vaginal swelling itching and vaginal discharge. Expected course of resolution and indication for return was gone over and questions were answered to patient/parent's satisfaction before discharge.        ICD-10-CM    1. Vaginal discharge  N89.8 Wet prep - Clinic Collect   2. Pregnant state, incidental  Z33.1    3. Yeast infection of the vagina  B37.3        There are no Patient Instructions on file for this visit.    HPI:  Vijay Her is a 31 year old female who presents today for a 2-day history of vulvar vaginal redness pruritus and vaginal discharge patient has had previous symptoms of yeast infection does feel similar to what she has had in the past.  Patient is currently 26 and 2/7 weeks pregnant.  Patient does not have any urinary symptoms, abdominal pain vaginal bleeding fever chills or night sweats.    History obtained from chart review and the patient.    Problem List:  2021: History of gestational diabetes in prior pregnancy,   currently pregnant in second trimester  2021: Abnormal ultrasound  2021: Prediabetes  2021: Nausea and vomiting in pregnancy  2021: Obesity affecting pregnancy in first trimester  2021: Morbid obesity (H)  2020-10: Acute cholecystitis  2018: Acanthosis nigricans  2018: Pregnant  2016:  (normal spontaneous vaginal delivery)  2016: Pregnant  2016-10: Gestational diabetes  2016: Normal pregnancy  2015-10:  (normal spontaneous vaginal delivery) 40-6  2015: Vitamin D deficiency (moderate vit D 16)  2015: Recurrent cold sores (oral)  2015: Heartburn in pregnancy  2015: Supervision of high risk pregnancy in second  "trimester  -: UTI in pregnancy  Hemorrhoids  Environmental allergies  Obesity   (normal spontaneous vaginal delivery)      Past Medical History:   Diagnosis Date     Acute cholecystitis 10/2/2020     Gestational diabetes mellitus (GDM) 10/12/2016    insulin dependant for over 4 mos     Menarche 16 y/o    Menarche 16 y/o, menses qmo, bleeding x7 days (heavier since birth of 2nd child)      (normal spontaneous vaginal delivery) 10/30/2015    - 10/31/15:  40w6d Luicnda's/Loredo, labor 2h57m (push 8m, CHRIS), no pain meds, no lac, EBL 175mL (recd pitocin 10mU IM). GBS neg. Female BW 7#14.3 oz, L 20\", HC 13.5\", apgars 9/9. formula-feeding          (normal spontaneous vaginal delivery) 2016     STAR (obstructive sleep apnea)      Postpartum depression     possible postpartum depression, mild     UTI in pregnancy 2014    - 14 prenatal 36+wk GA: UCx >100k eColi pan-sensitive - 6/10/15 prenatal 20-3 (walk in clinic): UCx >100k eColi (R amp/tetracycline/TMP-SMX), treated with nitrofurantoin - 2015 prenatal 24w3d: sxs resolved, Ucx NEG  - 2015 MyChart message: c/o strong odor back again, advised NTBS and check urine culture and if again has UTI then will need to keep her on prophylactic antibiotics for dur       Social History     Tobacco Use     Smoking status: Never Smoker     Smokeless tobacco: Never Used   Substance Use Topics     Alcohol use: No     Comment: Alcoholic Drinks/day: none       Review of Systems  As above in HPI otherwise negative  Vitals:    21 1525   BP: 133/83   Pulse: 99   Resp: 18   Temp: 98.6  F (37  C)   SpO2: 98%   Weight: 128.8 kg (284 lb)     General: Patient is resting comfortably no acute distress is afebrile  HEENT: Head is normocephalic atraumatic   Abdomen: Nontender.  Distended secondary to pregnancy.  No CVA tenderness to percussion.  Skin: Without rash non-diaphoretic   exam was deferred by the patient.    Physical Exam    Labs:  Results " for orders placed or performed in visit on 12/14/21   Wet prep - Clinic Collect     Status: Abnormal    Specimen: Vagina; Swab   Result Value Ref Range    Trichomonas Absent Absent    Yeast Present (A) Absent    Clue Cells Absent Absent    WBCs/high power field 1+ (A) None       At the end of the encounter, I discussed results, diagnosis, medications. Discussed red flags for immediate return to clinic/ER, as well as indications for follow up if no improvement. Patient understood and agreed to plan. Patient was stable for discharge.

## 2021-12-14 NOTE — PATIENT INSTRUCTIONS
You have a vaginal yeast infection.    See below for information regarding yeast infections.    Please use topical monistat antifungal for infection.  May repeat the dose in 72 hours if no improvement or incomplete clearance of symptoms.     Yeast infections can be caused by antibiotics and by moisture. Some times you can have overgrowth of yeast for noparticular reason at all. To help try to prevent yeast infections in the future you should take probiotics while on antibiotics and remove wet or sweaty clothing immediately.    Follow up with primary care provider ifno improvement in 5 days, sooner if worsening.    Vaginal Infection: Yeast (Candidiasis)  Yeast infection occurs when yeast in the vagina increase and start attacking the vaginal tissues.Yeast is a type of fungus. These infections are often caused by a type of yeast called Candida albicans. Other species of yeast can also cause infections. Factors that may make infection more likely include recent antibioticuse, douching, or increased sex. Yeast infections are more common in women who have diabetes, or are obese or pregnant, or have a weak immune system.  Symptoms of yeast infection    Clumpy or thin, white discharge, which may look like cottage cheese    No odor or minimal odor    Severe vaginal itching or burning    Burning with urination    Swelling, redness of vulva    Painduring sex  Treating yeast infection  Yeast infection is treated with a vaginal antifungal cream. In some cases, antifungal pills are prescribed instead. During treatment:    Finish all of your medicine, even if your symptoms go away.    Apply the cream before going to bed. Lie flat after applying so that it doesn't drip out.    Do not douche oruse tampons.    Don't rely on a diaphragm or condoms, since the cream may weaken them.    Avoid intercourse if advised by your healthcare provider.     Should I treat a yeast infection myself?  Discuss with your healthcareprovider whether you  should use over-the-counter medicines to treat a yeast infection. Self-treatment may depend on whether:    You've had a yeast infection in the past.    You're at risk for STDs.  Call your healthcare provider if symptoms do not go away or come back after treatment.   Date Last Reviewed: 5/12/2015 2000-2016 [employer]. [address] [place] [address], [place] [address]. All rights reserved. This information is not intended as a substitute for professional medical care. Always follow your healthcareprofessional's instructions.

## 2021-12-20 RX ORDER — PRENATAL VIT/IRON FUM/FOLIC AC 27MG-0.8MG
TABLET ORAL
Status: ON HOLD | COMMUNITY
Start: 2021-12-14 | End: 2022-07-03

## 2021-12-21 ENCOUNTER — PRENATAL OFFICE VISIT (OUTPATIENT)
Dept: FAMILY MEDICINE | Facility: CLINIC | Age: 31
End: 2021-12-21
Payer: COMMERCIAL

## 2021-12-21 VITALS
DIASTOLIC BLOOD PRESSURE: 74 MMHG | SYSTOLIC BLOOD PRESSURE: 116 MMHG | HEART RATE: 96 BPM | BODY MASS INDEX: 45.48 KG/M2 | RESPIRATION RATE: 18 BRPM | TEMPERATURE: 98 F | WEIGHT: 283 LBS

## 2021-12-21 DIAGNOSIS — E66.01 MORBID OBESITY (H): ICD-10-CM

## 2021-12-21 DIAGNOSIS — Z86.32 HISTORY OF GESTATIONAL DIABETES IN PRIOR PREGNANCY, CURRENTLY PREGNANT IN SECOND TRIMESTER: ICD-10-CM

## 2021-12-21 DIAGNOSIS — O09.292 HISTORY OF GESTATIONAL DIABETES IN PRIOR PREGNANCY, CURRENTLY PREGNANT IN SECOND TRIMESTER: ICD-10-CM

## 2021-12-21 DIAGNOSIS — O99.211 OBESITY AFFECTING PREGNANCY IN FIRST TRIMESTER: ICD-10-CM

## 2021-12-21 DIAGNOSIS — O09.92 SUPERVISION OF HIGH RISK PREGNANCY IN SECOND TRIMESTER: Primary | ICD-10-CM

## 2021-12-21 LAB
GLUCOSE 1H P 50 G GLC PO SERPL-MCNC: 208 MG/DL (ref 70–129)
HGB BLD-MCNC: 11.3 G/DL (ref 11.7–15.7)

## 2021-12-21 PROCEDURE — 86780 TREPONEMA PALLIDUM: CPT | Performed by: FAMILY MEDICINE

## 2021-12-21 PROCEDURE — 82950 GLUCOSE TEST: CPT | Performed by: FAMILY MEDICINE

## 2021-12-21 PROCEDURE — 0004A PR COVID VAC PFIZER DIL RECON 30 MCG/0.3 ML IM: CPT | Performed by: FAMILY MEDICINE

## 2021-12-21 PROCEDURE — 90471 IMMUNIZATION ADMIN: CPT | Performed by: FAMILY MEDICINE

## 2021-12-21 PROCEDURE — 36415 COLL VENOUS BLD VENIPUNCTURE: CPT | Performed by: FAMILY MEDICINE

## 2021-12-21 PROCEDURE — 91300 PR COVID VAC PFIZER DIL RECON 30 MCG/0.3 ML IM: CPT | Performed by: FAMILY MEDICINE

## 2021-12-21 PROCEDURE — 90715 TDAP VACCINE 7 YRS/> IM: CPT | Performed by: FAMILY MEDICINE

## 2021-12-21 PROCEDURE — 99207 PR PRENATAL VISIT: CPT | Performed by: FAMILY MEDICINE

## 2021-12-21 NOTE — PROGRESS NOTES
No ctx, lof, bleeding, or discharge.  No HA or vision changes.  Good FM : yes  Very bad yeast infection- everything was swollen from labia to anus  Got it treated and feels better  Had one sitting around at home to treat. miconazole cream    Exam:   See flowsheet  GEN:  31 year old female sitting comfortably in no apparent distress.   HEENT:  no scleral icterus, buccal mucosa moist  CHEST/LUNG: No respiratory distress, unlabored breathing  ABD:  Soft, non-tender, Gravid uterus  PSYCH:  Mood and affect appropriate    Mao was seen today for prenatal care.    Diagnoses and all orders for this visit:    Supervision of high risk pregnancy in second trimester  -     Glucose tolerance, gest screen, 1 hour; Future  -     OB hemoglobin; Future  -     Treponema Abs w Reflex to RPR and Titer; Future  -     TDAP VACCINE (Adacel, Boostrix)  [[id number]]  -     Glucose tolerance, gest screen, 1 hour  -     OB hemoglobin  -     Treponema Abs w Reflex to RPR and Titer    History of gestational diabetes in prior pregnancy, currently pregnant in second trimester  Comments:  A1c had improved at last visit as she was unable to do a early 1 hour GTT that day.  Will await results today.    Morbid obesity (H)  Comments:  continues f/u with MFM for serial growth US.     Obesity affecting pregnancy in first trimester    Other orders  -     MT COVID VAC PFIZER DIL RECON 30 MCG/0.3 ML IM

## 2021-12-22 LAB — T PALLIDUM AB SER QL: NONREACTIVE

## 2022-01-12 ENCOUNTER — ANCILLARY PROCEDURE (OUTPATIENT)
Dept: ULTRASOUND IMAGING | Facility: HOSPITAL | Age: 32
End: 2022-01-12
Attending: OBSTETRICS & GYNECOLOGY
Payer: COMMERCIAL

## 2022-01-12 ENCOUNTER — OFFICE VISIT (OUTPATIENT)
Dept: MATERNAL FETAL MEDICINE | Facility: HOSPITAL | Age: 32
End: 2022-01-12
Attending: OBSTETRICS & GYNECOLOGY
Payer: COMMERCIAL

## 2022-01-12 DIAGNOSIS — O24.113 PRE-EXISTING TYPE 2 DIABETES MELLITUS DURING PREGNANCY IN THIRD TRIMESTER: Primary | ICD-10-CM

## 2022-01-12 DIAGNOSIS — O99.211 OBESITY AFFECTING PREGNANCY IN FIRST TRIMESTER: ICD-10-CM

## 2022-01-12 PROCEDURE — 99207 PR NO CHARGE LOS: CPT | Performed by: OBSTETRICS & GYNECOLOGY

## 2022-01-12 PROCEDURE — 76816 OB US FOLLOW-UP PER FETUS: CPT

## 2022-01-12 PROCEDURE — 76816 OB US FOLLOW-UP PER FETUS: CPT | Mod: 26 | Performed by: OBSTETRICS & GYNECOLOGY

## 2022-01-12 NOTE — PROGRESS NOTES
The patient was seen for an ultrasound in the Maternal[facility]Fetal Medicine Center at the [facility] today.  For a detailed report of the ultrasound examination, please see the ultrasound report which can be found under the imaging tab.    Ciara Ornelas MD  , OB/GYN  Maternal-Fetal Medicine  [phone] (Pager)

## 2022-01-17 ENCOUNTER — VIRTUAL VISIT (OUTPATIENT)
Dept: EDUCATION SERVICES | Facility: CLINIC | Age: 32
End: 2022-01-17
Payer: COMMERCIAL

## 2022-01-17 DIAGNOSIS — O24.419 GDM (GESTATIONAL DIABETES MELLITUS): Primary | ICD-10-CM

## 2022-01-17 PROCEDURE — G0108 DIAB MANAGE TRN  PER INDIV: HCPCS | Performed by: DIETITIAN, REGISTERED

## 2022-01-17 RX ORDER — BLOOD-GLUCOSE METER
1 EACH MISCELLANEOUS ONCE
Qty: 1 KIT | Refills: 1 | Status: SHIPPED | OUTPATIENT
Start: 2022-01-17 | End: 2022-01-17

## 2022-01-17 NOTE — LETTER
2022         RE: Vijay Betancur  [address] [place] [address]        Dear Colleague,    Thank you for referring your patient, Vijay Betancur, to the [facility]. Please see a copy of my visit note below.    Diabetes Self-Management Education & Support      SUBJECTIVE/OBJECTIVE:  Presents for education related to gestational diabetes.    Accompanied by: Self  Gestational weeks: 31w1d  Next OB Visit Date: 22  Number of previous pregnancies: 5  Had any babies over 9 lbs: No  Previously had Gestational Diabetes: Yes (hx of GDM (insulin controlled) in 2 of her previous pregnancies, with the last one being 4 years ago)  Had Diabetes Education before: Yes  Previous insulin or other diabetes medication during that pregnancy: Yes  Have you ever had thyroid problems or taken thyroid medication?: No  Heart disease, mitral valve prolapse or rheumatic fever?: No  Hypertension : No  High Cholesterol: No  High Triglycerides: No  Do you use tobacco products?: No  Do you drink beer, wine or hard liquor?: No    Cultural Influences/Ethnic Background:  Not  or       Estimated Date of Delivery: Mar 20, 2022    1 hour OGTT  Lab Results   Component Value Date    GLU1 208 (H) 2021         3 hour OGTT    Fasting  No results found for: GTTGF    1 hour  No results found for: GTTG1    2 hour  No results found for: GTTG2    3 hour  No results found for: GTTG3    Lifestyle and Health Behaviors:  Exercise::  (not much activity due to increased pelvic pressure)  Meal planning/habits:  (2-3 meals daily, consisting of: rice, meat, veg. Typically 2-3 cups rice per meal)  Meals include: Breakfast,Lunch,Dinner (was on a keto diet for a couple years before this pregnancy.)  Beverages: Water,Soda,Juice (likes to have cranberry juice and pop regularly)  Biggest challenges to healthy eating: Portion control,Other (2-3 cups of rice)  Pre-rubi vitamin?: Yes  Supplements?: No  Experiencing nausea?:  "No  Experiencing heartburn?: No    Healthy Coping:  Emotional response to diabetes: Acceptance  Informal Support system:: Spouse  Stage of change: PREPARATION (Decided to change - considering how)    Current Management:       ASSESSMENT:  F/u GDM visit.   Prediabetes   . Hx of GDM in two of her previous pregnancies; was insulin controlled.    Vijay had diabetes education in  but she did not attend any follow up sessions since her blood sugars were \"normal\".   Her recent 1 hr OGTT and A1C came back elevated and she is interested in learning more.   We discussed hyperglycemia and risks during pregnancy and treatment - pt expressed understanding.     She had misplaced her glucose meter and has not been testing BG. She does have enough test strips and lancets.   Rx for Accu Check Guide me meter was sent in today.  We would need daily fasting and postprandial BG to assess for therapy - pt agreeable to start testing 4x/d.    Endorses consuming 2-3 cups of rice per meal and also cranberry juice and soda on a regular basis.  Not much activity - increased pelvic pressure.    She declines patient education materials today.     All other questions and concerns were addressed.      INTERVENTION:    Rx for Accu Check was sent in. Pt already has test strips and lancets. She also has ketostix for ketone testing.    Educational topics covered today:  GDM diagnosis, pathophysiology, Risks and Complications of GDM, Means of controlling GDM, Using a Blood Glucose Monitor, Blood Glucose Goals, Logging and Interpreting Glucose Results, Ketone Testing, When to Call a Diabetes Educator or OB Provider, Healthy Eating During Pregnancy, Counting Carbohydrates, Meal Planning for GDM, and Physical Activity    Educational materials provided today:     Pt declined education materials today.      PLAN:    Check glucose 4 times daily, before breakfast and 1 hour after each meal.      Check Ketones daily for one week, if negative, " reduce testing to once a week.      Physical activity recommended: at least 30 mins daily as able/safe. Walking 10-15 mins after meals/snacks can help lower blood sugar.    Meal plan:   Limit rice to 1 cup per meal.  Incorporate protein at every meal and snack.  Eat 3 small meals + 2-3 healthy snacks daily, including a HS snack.  Follow consistent CHO meal plan, eat CHO and protein/fat at all meals/snacks.  No juice or soda, incorporate more water.     Call/e-mail/Coint message diabetes educator if 3 or more blood sugars are above the goal in 1 week, if ketones are positive, or with questions/concerns.    Declined education materials today     F/u: 1/26  OBGYN:2/1  Time Spent: 30 minutes  Encounter Type: Individual    Any diabetes medication dose changes were made via the CDE Protocol and Collaborative Practice Agreement with the patient's referring provider. A copy of this encounter was shared with the provider.

## 2022-01-17 NOTE — PROGRESS NOTES
Diabetes Self-Management Education & Support      SUBJECTIVE/OBJECTIVE:  Presents for education related to gestational diabetes.    Accompanied by: Self  Gestational weeks: 31w1d  Next OB Visit Date: 22  Number of previous pregnancies: 5  Had any babies over 9 lbs: No  Previously had Gestational Diabetes: Yes (hx of GDM (insulin controlled) in 2 of her previous pregnancies, with the last one being 4 years ago)  Had Diabetes Education before: Yes  Previous insulin or other diabetes medication during that pregnancy: Yes  Have you ever had thyroid problems or taken thyroid medication?: No  Heart disease, mitral valve prolapse or rheumatic fever?: No  Hypertension : No  High Cholesterol: No  High Triglycerides: No  Do you use tobacco products?: No  Do you drink beer, wine or hard liquor?: No    Cultural Influences/Ethnic Background:  Not  or       Estimated Date of Delivery: Mar 20, 2022    1 hour OGTT  Lab Results   Component Value Date    GLU1 208 (H) 2021         3 hour OGTT    Fasting  No results found for: GTTGF    1 hour  No results found for: GTTG1    2 hour  No results found for: GTTG2    3 hour  No results found for: GTTG3    Lifestyle and Health Behaviors:  Exercise::  (not much activity due to increased pelvic pressure)  Meal planning/habits:  (2-3 meals daily, consisting of: rice, meat, veg. Typically 2-3 cups rice per meal)  Meals include: Breakfast,Lunch,Dinner (was on a keto diet for a couple years before this pregnancy.)  Beverages: Water,Soda,Juice (likes to have cranberry juice and pop regularly)  Biggest challenges to healthy eating: Portion control,Other (2-3 cups of rice)  Pre- vitamin?: Yes  Supplements?: No  Experiencing nausea?: No  Experiencing heartburn?: No    Healthy Coping:  Emotional response to diabetes: Acceptance  Informal Support system:: Spouse  Stage of change: PREPARATION (Decided to change - considering how)    Current Management:    "    ASSESSMENT:  F/u GDM visit.   Prediabetes   . Hx of GDM in two of her previous pregnancies; was insulin controlled.    Vijay had diabetes education in  but she did not attend any follow up sessions since her blood sugars were \"normal\".   Her recent 1 hr OGTT and A1C came back elevated and she is interested in learning more.   We discussed hyperglycemia and risks during pregnancy and treatment - pt expressed understanding.     She had misplaced her glucose meter and has not been testing BG. She does have enough test strips and lancets.   Rx for Accu Check Guide me meter was sent in today.  We would need daily fasting and postprandial BG to assess for therapy - pt agreeable to start testing 4x/d.    Endorses consuming 2-3 cups of rice per meal and also cranberry juice and soda on a regular basis.  Not much activity - increased pelvic pressure.    She declines patient education materials today.     All other questions and concerns were addressed.      INTERVENTION:    Rx for Accu Check was sent in. Pt already has test strips and lancets. She also has ketostix for ketone testing.    Educational topics covered today:  GDM diagnosis, pathophysiology, Risks and Complications of GDM, Means of controlling GDM, Using a Blood Glucose Monitor, Blood Glucose Goals, Logging and Interpreting Glucose Results, Ketone Testing, When to Call a Diabetes Educator or OB Provider, Healthy Eating During Pregnancy, Counting Carbohydrates, Meal Planning for GDM, and Physical Activity    Educational materials provided today:     Pt declined education materials today.      PLAN:    Check glucose 4 times daily, before breakfast and 1 hour after each meal.      Check Ketones daily for one week, if negative, reduce testing to once a week.      Physical activity recommended: at least 30 mins daily as able/safe. Walking 10-15 mins after meals/snacks can help lower blood sugar.    Meal plan:   Limit rice to 1 cup per " meal.  Incorporate protein at every meal and snack.  Eat 3 small meals + 2-3 healthy snacks daily, including a HS snack.  Follow consistent CHO meal plan, eat CHO and protein/fat at all meals/snacks.  No juice or soda, incorporate more water.     Call/e-mail/MyChart message diabetes educator if 3 or more blood sugars are above the goal in 1 week, if ketones are positive, or with questions/concerns.    Declined education materials today     F/u: 1/26  OBGYN:2/1  Time Spent: 30 minutes  Encounter Type: Individual    Any diabetes medication dose changes were made via the CDE Protocol and Collaborative Practice Agreement with the patient's referring provider. A copy of this encounter was shared with the provider.

## 2022-01-26 ENCOUNTER — VIRTUAL VISIT (OUTPATIENT)
Dept: EDUCATION SERVICES | Facility: CLINIC | Age: 32
End: 2022-01-26
Payer: COMMERCIAL

## 2022-01-26 DIAGNOSIS — O24.419 GDM (GESTATIONAL DIABETES MELLITUS): Primary | ICD-10-CM

## 2022-01-26 PROCEDURE — G0108 DIAB MANAGE TRN  PER INDIV: HCPCS | Mod: AE | Performed by: DIETITIAN, REGISTERED

## 2022-01-26 NOTE — PROGRESS NOTES
Diabetes Self-Management Education & Support    SUBJECTIVE/OBJECTIVE:  Presents for education related to gestational diabetes.    Accompanied by: Self  Gestational weeks: 32w3d  Next OB Visit Date: 22  Number of previous pregnancies: 5  Had any babies over 9 lbs: No  Previously had Gestational Diabetes: Yes (hx of GDM (insulin controlled) in 2 of her previous pregnancies, with the last one being 4 years ago).  Had Diabetes Education before: Yes  Previous insulin or other diabetes medication during that pregnancy: Yes  Have you ever had thyroid problems or taken thyroid medication?: No  Heart disease, mitral valve prolapse or rheumatic fever?: No  Hypertension : No  High Cholesterol: No  High Triglycerides: No  Do you use tobacco products?: No  Do you drink beer, wine or hard liquor?: No    Cultural Influences/Ethnic Background:  Not  or       LMP 2021     At 32 weeks gestation.    Estimated Date of Delivery: Mar 20, 2022    Blood Glucose/Ketone Log:   Date Ketones Fasting Post Breakfast Post Lunch Post Supper    neg 96 Skipped breakfast 245 199    neg 110 Skipped  189    neg 115 Skipped  174    neg  97 Skipped  224    neg 86 Skipped  198       Lifestyle and Health Behaviors:  Exercise::  (not much activity due to increased pelvic pressure)  Meal planning/habits:  (2-3 meals daily, consisting of: rice, meat, veg. Typically 2-3 cups rice per meal)  Meals include: Breakfast,Lunch,Dinner (was on a keto diet for a couple years before this pregnancy.)  Beverages: Water,Soda,Juice (drinks cranberry juice and pop regularly - has c/b on the amount though now.)  Biggest challenges to healthy eating: Portion control,Other (2-3 cups of rice)  Pre- vitamin?: Yes  Supplements?: No  Experiencing nausea?: No  Experiencing heartburn?: No    Healthy Coping:  Emotional response to diabetes: Acceptance  Informal Support system:: Spouse  Stage of change:  PREPARATION (Decided to change - considering how)    Current Management:       ASSESSMENT:  Ketones: negative.   Fasting blood glucoses: 20% in target.  After breakfast: 0% in target.  After lunch: 0% in target.  After dinner: 0% in target.    Vijay has been testing BG 4x/d and daily ketones. ~ 95% of her daily BS are elevated.   Ketones, always negative per patient.  She has made some changes to her diet - less rice than before but more than a cup/meal still, trying to do more protein.   Has c/b on the amount/quantity of juice and soda, but has been having it quite frequently still.     Pt would need both, basal and meal time coverage. We will start her on 10 U of basal insulin/NPH at HS and 6 units of meal time insulin with meals (Shayy Lopez NP, Endocrinology on board with the plan).     Discussion: Reviewed BG goals with patient, persistent hyperglycemia and need for exogenous insulin and Vijay is agreeable to start to assist with better BG control.   We reviewed insulin basics including injection technique, storage, sharps disposal. However, we got disconnected in the middle of our visit and we could not get to discuss dosage. I tried calling back x 2 and left a voicemail for pt with our call back number. We will try calling her again.     ** Pt has used insulin in 2 of her previous pregnancies.     INTERVENTION:  Educational topics covered today:  BG goals, Insulin use, food choices, when to Call a Diabetes Educator or OB Provider    Educational Materials provided today:  [place] Preventing Diabetes    PLAN:      Pt would need both, basal and meal time coverage. We will start her on 10 U of basal insulin/NPH at HS and 6 units of meal time insulin with meals (Shayy Lopez NP, Endocrinology on board with the plan) - Diabetes education team will reach out to the pt to discuss and move this forward.     To continue to check glucose 4 times daily.  Blood Glucose Targets:              Fasting: Less than 95 mg/dL               1 hour after a meal: less than 140 mg/dL    To check ketones once a week when readings are consistently negative.    To continue with recommended physical activity. Aim to get at least 30 minutes of activity each day as able/safe.     To continue to follow recommended meal plan:   Meal plan:   Eat balanced meals (3 meals + 3 snacks daily)  Limit rice to 1 cup per meal.  Incorporate protein at every meal and snack.  Eat 3 small meals + 2-3 healthy snacks daily, including a HS snack.  Follow consistent CHO meal plan, eat CHO and protein/fat at all meals/snacks.    Breakfast: 30 grams carbohydrate + Protein  Snack: 15-30 grams carbohydrate + protein  Lunch: 45-60 grams carbohydrate + protein/vegetables  Snack: 15-30 grams Carbohydrate + protein  Dinner: 45-60 grams carbohydrate + protein/vegetables  Bedtime Snack: 15-30 grams + protein     To call/e-mail/MyChart message diabetes educator if 3 or more blood sugars are above the goal in 1 week or if ketones are positive.    F/u: per protocol, once a pt is started on insulin, she is followed by the pregnancy clinic. I will request our team schedulers to call pt for the appointment.  OBGYN:2/1  Instructed pt to always bring their meter/BG log to all clinic visits - pt expressed understanding    Time Spent: 30 minutes  Encounter Type: Individual    Any diabetes medication dose changes were made via the CDE Protocol and Collaborative Practice Agreement with the patient's referring provider. A copy of this encounter was shared with the provider.

## 2022-01-26 NOTE — LETTER
1/26/2022         RE: Vijay Betancur  [address] [place] [address]        Dear Colleague,    Thank you for referring your patient, Vijay Betancur, to the [facility]. Please see a copy of my visit note below.    Diabetes Self-Management Education & Support    SUBJECTIVE/OBJECTIVE:  Presents for education related to gestational diabetes.    Accompanied by: Self  Gestational weeks: 32w3d  Next OB Visit Date: 02/01/22  Number of previous pregnancies: 5  Had any babies over 9 lbs: No  Previously had Gestational Diabetes: Yes (hx of GDM (insulin controlled) in 2 of her previous pregnancies, with the last one being 4 years ago).  Had Diabetes Education before: Yes  Previous insulin or other diabetes medication during that pregnancy: Yes  Have you ever had thyroid problems or taken thyroid medication?: No  Heart disease, mitral valve prolapse or rheumatic fever?: No  Hypertension : No  High Cholesterol: No  High Triglycerides: No  Do you use tobacco products?: No  Do you drink beer, wine or hard liquor?: No    Cultural Influences/Ethnic Background:  Not  or       LMP 06/13/2021     At 32 weeks gestation.    Estimated Date of Delivery: Mar 20, 2022    Blood Glucose/Ketone Log:   Date Ketones Fasting Post Breakfast Post Lunch Post Supper   1/25 neg 96 Skipped breakfast 245 199   1/24 neg 110 Skipped  189   1/23 neg 115 Skipped  174   1/22 neg  97 Skipped  224   1/21 neg 86 Skipped  198       Lifestyle and Health Behaviors:  Exercise::  (not much activity due to increased pelvic pressure)  Meal planning/habits:  (2-3 meals daily, consisting of: rice, meat, veg. Typically 2-3 cups rice per meal)  Meals include: Breakfast,Lunch,Dinner (was on a keto diet for a couple years before this pregnancy.)  Beverages: Water,Soda,Juice (drinks cranberry juice and pop regularly - has c/b on the amount though now.)  Biggest challenges to healthy eating: Portion control,Other (2-3  cups of rice)  Pre-rubi vitamin?: Yes  Supplements?: No  Experiencing nausea?: No  Experiencing heartburn?: No    Healthy Coping:  Emotional response to diabetes: Acceptance  Informal Support system:: Spouse  Stage of change: PREPARATION (Decided to change - considering how)    Current Management:       ASSESSMENT:  Ketones: negative.   Fasting blood glucoses: 20% in target.  After breakfast: 0% in target.  After lunch: 0% in target.  After dinner: 0% in target.    Vijay has been testing BG 4x/d and daily ketones. ~ 95% of her daily BS are elevated.   Ketones, always negative per patient.  She has made some changes to her diet - less rice than before but more than a cup/meal still, trying to do more protein.   Has c/b on the amount/quantity of juice and soda, but has been having it quite frequently still.     Pt would need both, basal and meal time coverage. We will start her on 10 U of basal insulin/NPH at HS and 6 units of meal time insulin with meals (Shayy Lopez NP, Endocrinology on board with the plan).     Discussion: Reviewed BG goals with patient, persistent hyperglycemia and need for exogenous insulin and Mao is agreeable to start to assist with better BG control.   We reviewed insulin basics including injection technique, storage, sharps disposal. However, we got disconnected in the middle of our visit and we could not get to discuss dosage. I tried calling back x 2 and left a voicemail for pt with our call back number. We will try calling her again.     ** Pt has used insulin in 2 of her previous pregnancies.     INTERVENTION:  Educational topics covered today:  BG goals, Insulin use, food choices, when to Call a Diabetes Educator or OB Provider    Educational Materials provided today:  [place] Preventing Diabetes    PLAN:      Pt would need both, basal and meal time coverage. We will start her on 10 U of basal insulin/NPH at HS and 6 units of meal time insulin with meals (Shayy Lopez NP, Endocrinology  on board with the plan) - Diabetes education team will reach out to the pt to discuss and move this forward.     To continue to check glucose 4 times daily.  Blood Glucose Targets:              Fasting: Less than 95 mg/dL              1 hour after a meal: less than 140 mg/dL    To check ketones once a week when readings are consistently negative.    To continue with recommended physical activity. Aim to get at least 30 minutes of activity each day as able/safe.     To continue to follow recommended meal plan:   Meal plan:   Eat balanced meals (3 meals + 3 snacks daily)  Limit rice to 1 cup per meal.  Incorporate protein at every meal and snack.  Eat 3 small meals + 2-3 healthy snacks daily, including a HS snack.  Follow consistent CHO meal plan, eat CHO and protein/fat at all meals/snacks.    Breakfast: 30 grams carbohydrate + Protein  Snack: 15-30 grams carbohydrate + protein  Lunch: 45-60 grams carbohydrate + protein/vegetables  Snack: 15-30 grams Carbohydrate + protein  Dinner: 45-60 grams carbohydrate + protein/vegetables  Bedtime Snack: 15-30 grams + protein     To call/e-mail/miacosahart message diabetes educator if 3 or more blood sugars are above the goal in 1 week or if ketones are positive.    F/u: per protocol, once a pt is started on insulin, she is followed by the pregnancy clinic. I will request our team schedulers to call pt for the appointment.  OBGYN:2/1  Instructed pt to always bring their meter/BG log to all clinic visits - pt expressed understanding    Time Spent: 30 minutes  Encounter Type: Individual    Any diabetes medication dose changes were made via the CDE Protocol and Collaborative Practice Agreement with the patient's referring provider. A copy of this encounter was shared with the provider.

## 2022-01-31 RX ORDER — CHLORPHENIR/PHENYLEPH/ASPIRIN 2-7.8-325
TABLET, EFFERVESCENT ORAL
COMMUNITY
Start: 2022-01-14 | End: 2022-12-08

## 2022-01-31 RX ORDER — BLOOD SUGAR DIAGNOSTIC
STRIP MISCELLANEOUS
COMMUNITY
Start: 2022-01-14

## 2022-01-31 RX ORDER — LANCETS
EACH MISCELLANEOUS
COMMUNITY
Start: 2022-01-14 | End: 2024-02-23

## 2022-01-31 RX ORDER — BLOOD-GLUCOSE METER
EACH MISCELLANEOUS
COMMUNITY
Start: 2022-01-17 | End: 2024-02-23

## 2022-01-31 RX ORDER — UBIQUINOL 100 MG
CAPSULE ORAL
COMMUNITY
Start: 2022-01-14

## 2022-02-01 ENCOUNTER — PRENATAL OFFICE VISIT (OUTPATIENT)
Dept: FAMILY MEDICINE | Facility: CLINIC | Age: 32
End: 2022-02-01
Payer: COMMERCIAL

## 2022-02-01 VITALS
DIASTOLIC BLOOD PRESSURE: 74 MMHG | HEART RATE: 86 BPM | TEMPERATURE: 98.1 F | BODY MASS INDEX: 46.77 KG/M2 | RESPIRATION RATE: 20 BRPM | WEIGHT: 291 LBS | SYSTOLIC BLOOD PRESSURE: 106 MMHG

## 2022-02-01 DIAGNOSIS — O09.90 HIGH-RISK PREGNANCY, UNSPECIFIED TRIMESTER: Primary | ICD-10-CM

## 2022-02-01 DIAGNOSIS — O24.414 GESTATIONAL DIABETES MELLITUS (GDM) REQUIRING INSULIN: ICD-10-CM

## 2022-02-01 DIAGNOSIS — E66.01 MORBID OBESITY (H): ICD-10-CM

## 2022-02-01 PROCEDURE — 99213 OFFICE O/P EST LOW 20 MIN: CPT | Performed by: FAMILY MEDICINE

## 2022-02-01 PROCEDURE — 99207 PR PRENATAL VISIT: CPT | Performed by: FAMILY MEDICINE

## 2022-02-01 RX ORDER — PEN NEEDLE, DIABETIC 30 GX3/16"
1 NEEDLE, DISPOSABLE MISCELLANEOUS 4 TIMES DAILY
Qty: 200 EACH | Refills: 1 | Status: ON HOLD | OUTPATIENT
Start: 2022-02-01 | End: 2022-07-06

## 2022-02-01 NOTE — PROGRESS NOTES
It has been 6 weeks since I have seen the patient.  She has been diagnosed with gestational diabetes.  Was not consistently following up with diabetic education.  She did have recent visit she is been checking her blood sugars and she was recommended to start on insulin.  Prescriptions not sent to the pharmacy.  Looks like the phone call cut out before they were you able to discuss the doses and they were not able to get a hold of her again.  She is been recommended to follow-up in the endocrinology clinic.  Patient concerned about multiple appointments due to the gestational diabetes.  She is concerned about going to the endocrinology clinic for a face to face appointment prefer virtual visits.    No ctx, lof, bleeding, or discharge.  No HA or vision changes.  Good FM :yes  Very tired. Back pain  i'm wondering about induction at 37 weeks  I want to have her vaginally.  I am worried she will get too big.  I don't want a c/s  mucousy discharge over the last week or so.  No bleeding.  No itching or irritation.  No foul smell.  We discussed likely her mucous plug.  Given that this is her 6 baby it is possible that she is already losing her mucous plug.  No other warning signs or symptoms of concern.    Most recent growth ultrasound 1/12 with AC 96 percentile but overall EFW 72 percentile, 1765 g    Exam:   See flowsheet  GEN:  31 year old female sitting comfortably in no apparent distress.   HEENT:  no scleral icterus, buccal mucosa moist  CHEST/LUNG: No respiratory distress, unlabored breathing  ABD:  Soft, non-tender, Gravid uterus  PSYCH:  Mood and affect appropriate    Mao was seen today for prenatal care.    Diagnoses and all orders for this visit:    High-risk pregnancy, unspecified trimester    Gestational diabetes mellitus (GDM) requiring insulin: I have prescribed the recommended doses of insulin per most recent diabetic education note.  Start NPH at 10 units nightly and mealtime insulin 6 units.  It seems  like she skips breakfast we discussed not taking the insulin if she is going to skip a meal.  Continue to check blood sugars daily.  Keep a log.  Follow-up with myself via MyChart or with diabetic education.  I will reach out to them and see if a virtual visit is okay.  She is starting at twice-weekly BPP's tomorrow.  Has a follow-up growth ultrasound scheduled.  Discussed that if her blood sugars are not well controlled with her insulin that induction would be recommended at 37 to 38 weeks.  If it is well controlled she would be recommended 39 weeks.  We reviewed her past pregnancies, birth weights of her babies.  She stated understanding agreeable to plan.  Will try to have close follow-up regarding her blood sugar management.  -     insulin  UNIT/ML injection; Inject 10 Units Subcutaneous At Bedtime  -     insulin aspart (NOVOLOG PEN) 100 UNIT/ML pen; Inject 6 Units Subcutaneous 3 times daily (with meals)  -     Insulin Pen Needle (PEN NEEDLES) 32G X 4 MM MISC; 1 each 4 times daily    Morbid obesity (H)

## 2022-02-02 ENCOUNTER — ANCILLARY PROCEDURE (OUTPATIENT)
Dept: ULTRASOUND IMAGING | Facility: HOSPITAL | Age: 32
End: 2022-02-02
Attending: OBSTETRICS & GYNECOLOGY
Payer: COMMERCIAL

## 2022-02-02 ENCOUNTER — OFFICE VISIT (OUTPATIENT)
Dept: MATERNAL FETAL MEDICINE | Facility: HOSPITAL | Age: 32
End: 2022-02-02
Attending: OBSTETRICS & GYNECOLOGY
Payer: COMMERCIAL

## 2022-02-02 DIAGNOSIS — O24.113 PRE-EXISTING TYPE 2 DIABETES MELLITUS DURING PREGNANCY IN THIRD TRIMESTER: ICD-10-CM

## 2022-02-02 DIAGNOSIS — O40.3XX0 POLYHYDRAMNIOS IN THIRD TRIMESTER COMPLICATION, SINGLE OR UNSPECIFIED FETUS: Primary | ICD-10-CM

## 2022-02-02 DIAGNOSIS — O99.211 OBESITY AFFECTING PREGNANCY IN FIRST TRIMESTER: ICD-10-CM

## 2022-02-02 PROCEDURE — 99207 PR NO CHARGE LOS: CPT | Performed by: OBSTETRICS & GYNECOLOGY

## 2022-02-02 PROCEDURE — 76819 FETAL BIOPHYS PROFIL W/O NST: CPT

## 2022-02-02 PROCEDURE — 76819 FETAL BIOPHYS PROFIL W/O NST: CPT | Mod: 26 | Performed by: OBSTETRICS & GYNECOLOGY

## 2022-02-02 NOTE — PROGRESS NOTES
Please see full imaging report from [facility] program under imaging tab.    Syed Villasenor MD  Maternal Fetal Medicine

## 2022-02-09 ENCOUNTER — ANCILLARY PROCEDURE (OUTPATIENT)
Dept: ULTRASOUND IMAGING | Facility: HOSPITAL | Age: 32
End: 2022-02-09
Attending: OBSTETRICS & GYNECOLOGY
Payer: COMMERCIAL

## 2022-02-09 ENCOUNTER — OFFICE VISIT (OUTPATIENT)
Dept: MATERNAL FETAL MEDICINE | Facility: HOSPITAL | Age: 32
End: 2022-02-09
Attending: OBSTETRICS & GYNECOLOGY
Payer: COMMERCIAL

## 2022-02-09 DIAGNOSIS — O24.113 PRE-EXISTING TYPE 2 DIABETES MELLITUS DURING PREGNANCY IN THIRD TRIMESTER: ICD-10-CM

## 2022-02-09 DIAGNOSIS — O24.414 INSULIN CONTROLLED GESTATIONAL DIABETES MELLITUS (GDM) IN THIRD TRIMESTER: Primary | ICD-10-CM

## 2022-02-09 DIAGNOSIS — O99.211 OBESITY AFFECTING PREGNANCY IN FIRST TRIMESTER: ICD-10-CM

## 2022-02-09 PROCEDURE — 76819 FETAL BIOPHYS PROFIL W/O NST: CPT | Mod: 26 | Performed by: OBSTETRICS & GYNECOLOGY

## 2022-02-09 PROCEDURE — 76816 OB US FOLLOW-UP PER FETUS: CPT | Mod: 26 | Performed by: OBSTETRICS & GYNECOLOGY

## 2022-02-09 PROCEDURE — 76816 OB US FOLLOW-UP PER FETUS: CPT

## 2022-02-09 PROCEDURE — 76819 FETAL BIOPHYS PROFIL W/O NST: CPT

## 2022-02-09 PROCEDURE — 99207 PR NO CHARGE LOS: CPT | Performed by: OBSTETRICS & GYNECOLOGY

## 2022-02-09 NOTE — PROGRESS NOTES
The patient was seen for an ultrasound in the [facility] at the [facility] today.  For a detailed report of the ultrasound examination, please see the ultrasound report which can be found under the imaging tab.    Ciara Ornelas MD  , OB/GYN  Maternal-Fetal Medicine  [phone] (Pager)

## 2022-02-15 ENCOUNTER — PRENATAL OFFICE VISIT (OUTPATIENT)
Dept: FAMILY MEDICINE | Facility: CLINIC | Age: 32
End: 2022-02-15
Payer: COMMERCIAL

## 2022-02-15 VITALS
SYSTOLIC BLOOD PRESSURE: 110 MMHG | DIASTOLIC BLOOD PRESSURE: 76 MMHG | WEIGHT: 286 LBS | HEART RATE: 63 BPM | BODY MASS INDEX: 45.96 KG/M2 | TEMPERATURE: 97.3 F | RESPIRATION RATE: 16 BRPM

## 2022-02-15 DIAGNOSIS — O99.211 OBESITY AFFECTING PREGNANCY IN FIRST TRIMESTER: ICD-10-CM

## 2022-02-15 DIAGNOSIS — O09.90 HIGH-RISK PREGNANCY, UNSPECIFIED TRIMESTER: Primary | ICD-10-CM

## 2022-02-15 DIAGNOSIS — O24.414 GESTATIONAL DIABETES MELLITUS (GDM) REQUIRING INSULIN: ICD-10-CM

## 2022-02-15 PROCEDURE — 87653 STREP B DNA AMP PROBE: CPT | Performed by: FAMILY MEDICINE

## 2022-02-15 PROCEDURE — 99207 PR PRENATAL VISIT: CPT | Performed by: FAMILY MEDICINE

## 2022-02-15 NOTE — PROGRESS NOTES
New script sent to pharmacy for increased dosage.    Patient states she is done with this pregnancy.  She is too uncomfortable.  No ctx, lof, bleeding.  Has noticed some mucousy discharge.  Wonders if she can be checked today.  No HA or vision changes.  Good FM : yes  Wants to check cervix today.   Has not been checking her BS the last couple of days  Did not make my appt yesterday.   i'm just basically on bedrest at home  Can't sit, can't walk, can't bend  she isn't sure why  Never been this way before with my previous pregnancies.   With other kids usually works up until 37 weeks  I've been jobless ever since November  She has not been taking her insluin  Or checking bs  can't get her hands on my glucometer  Can't manage to get up and get it  Hasn't been eating.   Only time she eats is when they come home  When she was checking:   160 after insulin  Fasting: pretty good when she takes her insulin.  forgot to take her night insulin  116 one time when she forgot    Exam:   See flowsheet  GEN:  31 year old female sitting comfortably in no apparent distress.   HEENT:  no scleral icterus, buccal mucosa moist  CHEST/LUNG: No respiratory distress, unlabored breathing  ABD:  Soft, non-tender, Gravid uterus  PSYCH:  Mood and affect appropriate    Mao was seen today for prenatal care.    Diagnoses and all orders for this visit:    High-risk pregnancy, unspecified trimester  -     Group B strep PCR    Gestational diabetes mellitus (GDM) requiring insulin    Obesity affecting pregnancy in first trimester      Patient was a no-show for her endocrinology appointment.  Patient very wishy-washy about her blood sugars.  She did not bring her glucometer with her today.  She was a bit noncompliant with her insulin and checking blood sugars in her last pregnancy as well.  Sounds like when she was taking her insulin they were not too bad.  She reports fastings were good and was able to tell me that the one night she forgot her insulin the fasting was elevated at 116.  Postprandial she  gave me was 160 as an example.  For the last few days she admits she has not been taking her insulin or checking her blood sugars.  She feels too tired and has put herself on bedrest at home.  Will not get out of bed while kids and  gone during the day.  I have asked her to put her glucometer next to her bed and get up and eat something.  This is likely due to baby #6 for patient, morbid obesity as well as the gestational diabetes.  Ideally I would like her to check her blood sugars and plan for induction at 39 weeks.  If she continues not to check her blood sugars or take her insulin I will need to consider induction at 37 to 38 weeks.  Patient seemed a little bit more motivated to check sugars at the end of her visit today.  We will see what happens.  I have asked her to message me on my chart.  She had not responded to my previous message.  At this point I am out of the clinic next week.  Our next appointment is March 1.  I will reassess the situation at that time and consider induction if needed.  GBS swab was obtained today.  Continue and I encouraged her to go to her twice weekly BPP's with M.  Apparently she missed one yesterday.  Patient agreeable to plan.

## 2022-02-16 LAB
GP B STREP DNA SPEC QL NAA+PROBE: POSITIVE
PATIENT PENICILLIN, AMOXICILLIN, CEPHALOSPORINS ALLERGY: NO

## 2022-02-28 ENCOUNTER — OFFICE VISIT (OUTPATIENT)
Dept: MATERNAL FETAL MEDICINE | Facility: HOSPITAL | Age: 32
End: 2022-02-28
Attending: OBSTETRICS & GYNECOLOGY
Payer: COMMERCIAL

## 2022-02-28 ENCOUNTER — ANCILLARY PROCEDURE (OUTPATIENT)
Dept: ULTRASOUND IMAGING | Facility: HOSPITAL | Age: 32
End: 2022-02-28
Attending: OBSTETRICS & GYNECOLOGY
Payer: COMMERCIAL

## 2022-02-28 DIAGNOSIS — O24.414 INSULIN CONTROLLED GESTATIONAL DIABETES MELLITUS (GDM) IN THIRD TRIMESTER: ICD-10-CM

## 2022-02-28 DIAGNOSIS — O24.419 GESTATIONAL DIABETES MELLITUS (GDM) IN THIRD TRIMESTER, GESTATIONAL DIABETES METHOD OF CONTROL UNSPECIFIED: Primary | ICD-10-CM

## 2022-02-28 PROCEDURE — 76819 FETAL BIOPHYS PROFIL W/O NST: CPT

## 2022-02-28 PROCEDURE — 76819 FETAL BIOPHYS PROFIL W/O NST: CPT | Mod: 26 | Performed by: OBSTETRICS & GYNECOLOGY

## 2022-02-28 PROCEDURE — 99207 PR NO CHARGE LOS: CPT | Performed by: OBSTETRICS & GYNECOLOGY

## 2022-03-01 ENCOUNTER — HOSPITAL ENCOUNTER (OUTPATIENT)
Facility: HOSPITAL | Age: 32
Discharge: HOME | End: 2022-03-01
Attending: FAMILY MEDICINE | Admitting: FAMILY MEDICINE
Payer: COMMERCIAL

## 2022-03-01 ENCOUNTER — HOSPITAL ENCOUNTER (OUTPATIENT)
Facility: HOSPITAL | Age: 32
End: 2022-03-01
Admitting: FAMILY MEDICINE
Payer: COMMERCIAL

## 2022-03-01 LAB — SARS-COV-2 RNA RESP QL NAA+PROBE: NEGATIVE

## 2022-03-01 PROCEDURE — C9803 HOPD COVID-19 SPEC COLLECT: HCPCS

## 2022-03-01 PROCEDURE — 87635 SARS-COV-2 COVID-19 AMP PRB: CPT | Performed by: FAMILY MEDICINE

## 2022-03-02 PROBLEM — Z36.89 ENCOUNTER FOR TRIAGE IN PREGNANT PATIENT: Status: RESOLVED | Noted: 2022-03-02 | Resolved: 2022-03-02

## 2022-03-02 PROBLEM — Z34.90 PREGNANCY: Status: ACTIVE | Noted: 2022-03-02

## 2022-03-02 PROBLEM — Z36.89 ENCOUNTER FOR TRIAGE IN PREGNANT PATIENT: Status: ACTIVE | Noted: 2022-03-02

## 2022-03-02 PROBLEM — R73.03 PREDIABETES: Status: ACTIVE | Noted: 2021-11-23

## 2022-03-02 PROBLEM — Z86.32 HISTORY OF GESTATIONAL DIABETES IN PRIOR PREGNANCY, CURRENTLY PREGNANT IN SECOND TRIMESTER: Status: RESOLVED | Noted: 2021-11-23 | Resolved: 2022-03-02

## 2022-03-02 PROBLEM — O99.211 OBESITY AFFECTING PREGNANCY IN FIRST TRIMESTER: Status: ACTIVE | Noted: 2021-08-07

## 2022-03-02 PROBLEM — E66.01 MORBID OBESITY (H): Status: ACTIVE | Noted: 2021-06-04

## 2022-03-02 PROBLEM — O09.292 HISTORY OF GESTATIONAL DIABETES IN PRIOR PREGNANCY, CURRENTLY PREGNANT IN SECOND TRIMESTER: Status: RESOLVED | Noted: 2021-11-23 | Resolved: 2022-03-02

## 2022-03-02 PROBLEM — Z34.90 PREGNANCY: Status: RESOLVED | Noted: 2022-03-02 | Resolved: 2022-03-02

## 2022-03-05 ENCOUNTER — PATIENT OUTREACH (OUTPATIENT)
Dept: CARE COORDINATION | Facility: CLINIC | Age: 32
End: 2022-03-05
Payer: COMMERCIAL

## 2022-03-05 DIAGNOSIS — Z71.89 OTHER SPECIFIED COUNSELING: ICD-10-CM

## 2022-03-05 NOTE — PROGRESS NOTES
Clinic Care Coordination Contact    Background: Care Coordination referral placed from [facility] discharge report for reason of patient meeting criteria for a TCM outreach call by [facility] team.    Assessment: Upon chart review, [facility] Team member will cancel/close the referral for TCM outreach due to reason below:    Patient isn't interested in speaking with care team today and disconnected call    Plan: Care Coordination referral for TCM outreach canceled.    Jolene Rosales MA  [facility] [place], [facility]

## 2022-03-06 ENCOUNTER — MEDICAL CORRESPONDENCE (OUTPATIENT)
Dept: HEALTH INFORMATION MANAGEMENT | Facility: CLINIC | Age: 32
End: 2022-03-06
Payer: COMMERCIAL

## 2022-03-07 DIAGNOSIS — L90.6 STRETCH MARKS: Primary | ICD-10-CM

## 2022-03-07 RX ORDER — TRIAMCINOLONE ACETONIDE 1 MG/G
CREAM TOPICAL 2 TIMES DAILY
Qty: 45 G | Refills: 0 | Status: ON HOLD | OUTPATIENT
Start: 2022-03-07 | End: 2022-07-03

## 2022-03-20 ENCOUNTER — APPOINTMENT (OUTPATIENT)
Dept: RADIOLOGY | Facility: HOSPITAL | Age: 32
End: 2022-03-20
Attending: STUDENT IN AN ORGANIZED HEALTH CARE EDUCATION/TRAINING PROGRAM
Payer: COMMERCIAL

## 2022-03-20 ENCOUNTER — HOSPITAL ENCOUNTER (EMERGENCY)
Facility: HOSPITAL | Age: 32
Discharge: HOME | End: 2022-03-20
Attending: EMERGENCY MEDICINE | Admitting: EMERGENCY MEDICINE
Payer: COMMERCIAL

## 2022-03-20 VITALS
SYSTOLIC BLOOD PRESSURE: 128 MMHG | OXYGEN SATURATION: 99 % | BODY MASS INDEX: 43.74 KG/M2 | WEIGHT: 271 LBS | HEART RATE: 81 BPM | RESPIRATION RATE: 18 BRPM | TEMPERATURE: 99 F | DIASTOLIC BLOOD PRESSURE: 88 MMHG

## 2022-03-20 DIAGNOSIS — M54.9 UPPER BACK PAIN ON LEFT SIDE: ICD-10-CM

## 2022-03-20 PROCEDURE — 96372 THER/PROPH/DIAG INJ SC/IM: CPT | Performed by: EMERGENCY MEDICINE

## 2022-03-20 PROCEDURE — 99284 EMERGENCY DEPT VISIT MOD MDM: CPT | Mod: 25

## 2022-03-20 PROCEDURE — 71046 X-RAY EXAM CHEST 2 VIEWS: CPT

## 2022-03-20 PROCEDURE — 250N000011 HC RX IP 250 OP 636: Performed by: EMERGENCY MEDICINE

## 2022-03-20 RX ORDER — KETOROLAC TROMETHAMINE 30 MG/ML
60 INJECTION, SOLUTION INTRAMUSCULAR; INTRAVENOUS ONCE
Status: COMPLETED | OUTPATIENT
Start: 2022-03-20 | End: 2022-03-20

## 2022-03-20 RX ORDER — METHOCARBAMOL 500 MG/1
500 TABLET, FILM COATED ORAL 3 TIMES DAILY PRN
Qty: 20 TABLET | Refills: 0 | Status: ON HOLD | OUTPATIENT
Start: 2022-03-20 | End: 2022-07-03

## 2022-03-20 RX ADMIN — KETOROLAC TROMETHAMINE 60 MG: 30 INJECTION, SOLUTION INTRAMUSCULAR at 22:12

## 2022-03-20 ASSESSMENT — ENCOUNTER SYMPTOMS
SHORTNESS OF BREATH: 0
BACK PAIN: 1
NUMBNESS: 0

## 2022-03-21 ENCOUNTER — PATIENT OUTREACH (OUTPATIENT)
Dept: CARE COORDINATION | Facility: CLINIC | Age: 32
End: 2022-03-21
Payer: COMMERCIAL

## 2022-03-21 DIAGNOSIS — Z71.89 OTHER SPECIFIED COUNSELING: ICD-10-CM

## 2022-03-21 NOTE — DISCHARGE INSTRUCTIONS
At this time the suspected cause of your upper back pain is a muscle spasm.  Take the prescribed muscle relaxant as needed for any further pain.  You can continue using over-the-counter ibuprofen 600 or 800 mg every 8 hours as needed for any further pain.  The pain should subside over the next few days.  Follow-up with your primary care physician for reevaluation or return back to ED sooner for any worsening pain or any other new or concerning symptoms.

## 2022-03-21 NOTE — ED PROVIDER NOTES
EMERGENCY DEPARTMENT ENCOUNTER      NAME: Vijay Her  AGE: 31 year old female  YOB: 1990  MRN: [id number]  EVALUATION DATE & TIME: 3/20/2022  9:34 PM    PCP: Maggy Guerrero    ED PROVIDER: Alden Moody DO      Chief Complaint   Patient presents with     Back Pain         FINAL IMPRESSION:  1. Upper back pain on left side          ED COURSE & MEDICAL DECISION MAKIN-year-old female presented to the ED for evaluation of left upper to back pain that began yesterday.  The patient states that the pain is minimal at rest but worsens significantly whenever she moves her upper back or left arm.  She denies any radiation of the pain.  She also denied any other significant associated symptoms such as chest pain, shortness breath, or pleuritic pain.  Here in the ED the patient was noted to be slightly hypertensive, but she was otherwise hemodynamically stable.  The patient did not appear to be in any obvious distress or discomfort at the time of her initial evaluation.  On exam patient was noted to have reproducible tenderness palpation noted over the left rhomboid musculature.  The remainder of the physical exam was unremarkable.  Following initial evaluation the patient was given IM Toradol for her pain.      Chest x-ray was obtained which was nondiagnostic.    The patient was reevaluated and informed of the x-ray results.  The patient was informed that her upper back pain is likely related to muscle spasms.  The patient was educated on muscle spasms and reassured.  The patient was sent home with Robaxin to use as needed for any further muscle spasms in addition to over-the-counter ibuprofen.  The patient was instructed to follow-up with her primary care physician for reevaluation or to return back to ED sooner for any worsening of her back pain, chest pain, shortness of breath, or any other new or concerning symptoms.    Pertinent Labs & Imaging studies reviewed. (See chart for details)  10:04 PM I met with  the patient to gather history and to perform my initial exam. We discussed plans for the ED course, including diagnostic testing and treatment.     10:15 PM I discussed the plan for discharge with the patient, and patient is agreeable. We discussed supportive cares at home and reasons for return to the ER including new or worsening symptoms - all questions and concerns addressed. Patient to be discharged by RN.     At the conclusion of the encounter I discussed the results of all of the tests and the disposition. The questions were answered. The patient or family acknowledged understanding and was agreeable with the care plan.       PPE worn: n95 mask, goggles    MEDICATIONS GIVEN IN THE EMERGENCY:  Medications   ketorolac (TORADOL) injection 60 mg (60 mg Intramuscular Given 3/20/22 2212)       NEW PRESCRIPTIONS STARTED AT TODAY'S ER VISIT  New Prescriptions    METHOCARBAMOL (ROBAXIN) 500 MG TABLET    Take 1 tablet (500 mg) by mouth 3 times daily as needed for muscle spasms          =================================================================    HPI    Patient information was obtained from: The patient    Use of : N/A       Vijay Her is a 31 year old female with a pertinent history of acute cholecystitis who presents to this ED via walk-in for evaluation of back pain.    The patient reports that yesterday (3/19) she had sudden onset of a left upper back pain that has worsened today (3/20). Patient has never had similar symptoms before. She states that movement and lying flat provokes her pain. To try and manage her symptoms she has taken Ibuprofen and used heat packs but they have not provided relief. Of note, the patient delivered her baby ~18 days ago. The patient denies chest pain, shortness of breath, numbness and tingling, and any other symptoms or complaints at this time.       REVIEW OF SYSTEMS   Review of Systems   Respiratory: Negative for shortness of breath.    Cardiovascular: Negative for  "chest pain.   Musculoskeletal: Positive for back pain (left upper).   Neurological: Negative for numbness (or tingling).   All other systems reviewed and are negative.       PAST MEDICAL HISTORY:  Past Medical History:   Diagnosis Date     Acute cholecystitis 10/02/2020     Gestational diabetes mellitus (GDM) 10/12/2016    insulin dependant for over 4 mos     History of gestational diabetes in prior pregnancy, currently pregnant in second trimester 2021     Menarche 18 y/o    Menarche 18 y/o, menses qmo, bleeding x7 days (heavier since birth of 2nd child)      (normal spontaneous vaginal delivery) 10/30/2015    - 10/31/15:  40w6d [facility], labor 2h57m (push 8m, CHRIS), no pain meds, no lac, EBL 175mL (recd pitocin 10mU IM). GBS neg. Female BW 7#14.3 oz, L 20\", HC 13.5\", apgars 9/9. formula-feeding          (normal spontaneous vaginal delivery) 2016     STAR (obstructive sleep apnea)     does not wear cpap, tried using it but was not using it enough-reports no cpap at home 3/2/22     Postpartum depression     possible postpartum depression, mild     Supervision of high risk pregnancy in second trimester 3/19/2015     per Dr Loredo: FINAL EDC 10/25/15 by 19-5 u/s at [facility] on 6/5/15 c/w  UNsure LMP mid 2015 Obesity, BMI >40   7/8/15 24-3: 1 hr , HgbA1C 5.1 Replacement Utility updated for latest IMO load      UTI in pregnancy 2014    - 14 prenatal 36+wk GA: UCx >100k eColi pan-sensitive - 6/10/15 prenatal 20-3 (walk in clinic): UCx >100k eColi (R amp/tetracycline/TMP-SMX), treated with nitrofurantoin - 2015 prenatal 24w3d: sxs resolved, Ucx NEG  - 2015 MyChart message: c/o strong odor back again, advised NTBS and check urine culture and if again has UTI then will need to keep her on prophylactic antibiotics for dur       PAST SURGICAL HISTORY:  Past Surgical History:   Procedure Laterality Date     LAPAROSCOPIC CHOLECYSTECTOMY N/A 10/2/2020    " Procedure: CHOLECYSTECTOMY, LAPAROSCOPIC;  Surgeon: Malvin Chu MD;  Location: [facility];  Service: General     NO PAST SURGERIES             CURRENT MEDICATIONS:    methocarbamol (ROBAXIN) 500 MG tablet  ACCU-CHEK GUIDE test strip  Acetone, Urine, Test (KETONE TEST) STRP  Alcohol Swabs (ALCOHOL PREP) 70 % PADS  blood glucose monitoring (SOFTCLIX) lancets  Blood Glucose Monitoring Suppl (ACCU-CHEK GUIDE ME) w/Device KIT  docusate sodium (COLACE) 100 MG capsule  ibuprofen (ADVIL/MOTRIN) 800 MG tablet  Insulin Pen Needle (PEN NEEDLES) 32G X 4 MM MISC  Prenatal Vit-Fe Fumarate-FA (PRENATAL MULTIVITAMIN W/IRON) 27-0.8 MG tablet  triamcinolone (KENALOG) 0.1 % external cream        ALLERGIES:  No Known Allergies    FAMILY HISTORY:  Family History   Problem Relation Age of Onset     Snoring Brother      No Known Problems Son          11     No Known Problems Daughter          14     Cerebrovascular Disease Paternal Grandfather 50.00        estimate 50s y/o     Hypertension Paternal Grandfather      Diabetes Type 2  Paternal Grandmother         Pat Gma DM2, CAD     Heart Disease Paternal Grandmother         Pat Gma DM2, CAD     Diabetes Type 2  Paternal Uncle         insulin dependent DM2, obese     Gestational Diabetes Cousin         GDM insulin dependent with 2nd pregnancy, obese     Diabetes Type 2  Paternal Aunt      No Known Problems Mother      No Known Problems Father      No Known Problems Sister      No Known Problems Brother      No Known Problems Sister      No Known Problems Brother      No Known Problems Brother      Diabetes Maternal Grandmother      Cancer No family hx of        SOCIAL HISTORY:   Social History     Socioeconomic History     Marital status: Single     Spouse name: None     Number of children: 3     Years of education: [id number]     Highest education level: None   Occupational History     None   Tobacco Use     Smoking status: Never Smoker     Smokeless tobacco:  "Never Used   Substance and Sexual Activity     Alcohol use: No     Comment: Alcoholic Drinks/day: none     Drug use: No     Sexual activity: Yes     Partners: Male     Birth control/protection: None   Other Topics Concern     Parent/sibling w/ CABG, MI or angioplasty before 65F 55M? No   Social History Narrative    Notes per Dr Loredo 2015 (updated 10/31/2015):     HOME ENVIRONMENT:  - 2015: Lives with , 3 children (son  11, daughter  14, daughter  10/31/15), no pets, apt with stairs, in [place] [facility] [place] [facility] [place]    MARITAL HISTORY:  - culturally ma rried to Eliu Cohen since     EDUCATION:  - High School Grad, [employer]   - CNA at [employer]   - [employer] [facility] [employer] [employer] x2 yrs \"generals\"    NATIVE LANGUAGE:  - HMONG = 1st language in home  - ENLISH fluent    HOBBIES/LEISURE ACTIVITIES:  - fami ly time, music    PERSONAL HISTORY:  - Born in [facility]. Family came to [place] , then moved to [place] [place] . Family/parents originally from [place].    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 NAD  HENT: ENT inspection is normal. Oropharynx is moist and clear.   Eye: Pupils are equal and reactive to light. EOMI  Neck: The neck is supple, with full ROM, with no evidence of meningismus.  Pulmonary: Currently in no acute respiratory distress. Normal, non labored respirations, the lung sounds are " normal with good equal air movement. Clear to auscultation bilaterally.   Circulatory: Regular rate and rhythm. Peripheral pulses are strong and equal. No murmurs, rubs, or gallops.   Abdominal: The abdomen is soft. Nontender. No rigidity, guarding, or rebound. Bowel sounds normal.   Neurologic: Alert, oriented to person, place, and time. No motor deficit. No sensory deficit. Cranial nerves II through XII are intact.  Musculoskeletal: No extremity tenderness. Full range of motion in all extremities. No extremity edema. Mild tenderness to palpation over the left rhomboid musculature.   Skin: Skin color is normal. No rash. Warm. Dry to touch.      LAB:  All pertinent labs reviewed and interpreted.  Results for orders placed or performed during the hospital encounter of 03/20/22   Chest XR,  PA & LAT    Impression    IMPRESSION: Mild thoracolumbar scoliosis. Right size appears normal. Lungs appear clear.       RADIOLOGY:  Reviewed all pertinent imaging. Please see official radiology report.  Chest XR,  PA & LAT   Final Result   IMPRESSION: Mild thoracolumbar scoliosis. Right size appears normal. Lungs appear clear.            I, Dion Mcqueen , am serving as a scribe to document services personally performed by Alden Moody DO based on my observation and the provider's statements to me. I, Alden Moody, attest that Dion Mcqueen is acting in a scribe capacity, has observed my performance of the services and has documented them in accordance with my direction.    Alden Moody DO  Emergency Medicine  [facility] EMERGENCY DEPARTMENT  [address] [place] [id number] [phone]       Alden Moody DO  03/21/22 2957

## 2022-03-21 NOTE — PROGRESS NOTES
Clinic Care Coordination Contact  [facility]/Voicemail    Clinical Data: Care Coordinator Outreach  Outreach attempted x 1. Left message on patient's voicemail with call back information and requested return call.     Plan:Care Coordinator will try to reach patient again in 1-2 business days.    SARAI Brambila  [phone]  [facility]

## 2022-03-21 NOTE — ED TRIAGE NOTES
Pt arrives ambulatory via triage w/ reports of left mid/upper back pain hurts with breathing and coughing that began yesterday per pt. Pain worse w/ lying down. 7-8/10 pain. No trauma or related medical hx or obvious cause of pain endorsed by pt. No sickness at home.

## 2022-03-22 NOTE — PROGRESS NOTES
Clinic Care Coordination Contact  [facility]/Voicemail    Clinical Data: Care Coordinator Outreach  Outreach attempted x 2. Left message on patient's voicemail with call back information and requested return call.    Plan:Care Coordinator will do no further outreaches at this time.    SARAI Brambila  [phone]  [facility]

## 2022-03-30 ENCOUNTER — PRENATAL OFFICE VISIT (OUTPATIENT)
Dept: FAMILY MEDICINE | Facility: CLINIC | Age: 32
End: 2022-03-30
Payer: COMMERCIAL

## 2022-03-30 ENCOUNTER — MEDICAL CORRESPONDENCE (OUTPATIENT)
Dept: HEALTH INFORMATION MANAGEMENT | Facility: CLINIC | Age: 32
End: 2022-03-30

## 2022-03-30 VITALS
DIASTOLIC BLOOD PRESSURE: 88 MMHG | HEART RATE: 69 BPM | WEIGHT: 269 LBS | SYSTOLIC BLOOD PRESSURE: 131 MMHG | TEMPERATURE: 97.3 F | BODY MASS INDEX: 43.42 KG/M2

## 2022-03-30 DIAGNOSIS — R03.0 ELEVATED BP WITHOUT DIAGNOSIS OF HYPERTENSION: ICD-10-CM

## 2022-03-30 DIAGNOSIS — Z30.017 INSERTION OF NEXPLANON: ICD-10-CM

## 2022-03-30 LAB
ALBUMIN MFR UR ELPH: <7 MG/DL
ALT SERPL W P-5'-P-CCNC: 26 U/L (ref 0–45)
AST SERPL W P-5'-P-CCNC: 25 U/L (ref 0–40)
CREAT SERPL-MCNC: 0.72 MG/DL (ref 0.6–1.1)
CREAT UR-MCNC: 74 MG/DL
ERYTHROCYTE [DISTWIDTH] IN BLOOD BY AUTOMATED COUNT: 12.5 % (ref 10–15)
GFR SERPL CREATININE-BSD FRML MDRD: >90 ML/MIN/1.73M2
HBA1C MFR BLD: 5.8 % (ref 0–5.6)
HCT VFR BLD AUTO: 37.4 % (ref 35–47)
HGB BLD-MCNC: 12.2 G/DL (ref 11.7–15.7)
MCH RBC QN AUTO: 26.8 PG (ref 26.5–33)
MCHC RBC AUTO-ENTMCNC: 32.6 G/DL (ref 31.5–36.5)
MCV RBC AUTO: 82 FL (ref 78–100)
PLATELET # BLD AUTO: 273 10E3/UL (ref 150–450)
PROT/CREAT 24H UR: NORMAL MG/G{CREAT}
RBC # BLD AUTO: 4.55 10E6/UL (ref 3.8–5.2)
WBC # BLD AUTO: 6.4 10E3/UL (ref 4–11)

## 2022-03-30 PROCEDURE — 36415 COLL VENOUS BLD VENIPUNCTURE: CPT | Performed by: FAMILY MEDICINE

## 2022-03-30 PROCEDURE — 99207 PR POST PARTUM EXAM: CPT | Performed by: FAMILY MEDICINE

## 2022-03-30 PROCEDURE — 85027 COMPLETE CBC AUTOMATED: CPT | Performed by: FAMILY MEDICINE

## 2022-03-30 PROCEDURE — 84460 ALANINE AMINO (ALT) (SGPT): CPT | Performed by: FAMILY MEDICINE

## 2022-03-30 PROCEDURE — 82565 ASSAY OF CREATININE: CPT | Performed by: FAMILY MEDICINE

## 2022-03-30 PROCEDURE — 11981 INSERTION DRUG DLVR IMPLANT: CPT | Performed by: FAMILY MEDICINE

## 2022-03-30 PROCEDURE — 2894A HEMOGLOBIN A1C: CPT | Performed by: FAMILY MEDICINE

## 2022-03-30 PROCEDURE — 84156 ASSAY OF PROTEIN URINE: CPT | Performed by: FAMILY MEDICINE

## 2022-03-30 PROCEDURE — 84450 TRANSFERASE (AST) (SGOT): CPT | Performed by: FAMILY MEDICINE

## 2022-03-30 RX ORDER — LIDOCAINE HYDROCHLORIDE AND EPINEPHRINE 10; 10 MG/ML; UG/ML
2.5 INJECTION, SOLUTION INFILTRATION; PERINEURAL ONCE
Status: COMPLETED | OUTPATIENT
Start: 2022-03-30 | End: 2022-03-30

## 2022-03-30 RX ADMIN — LIDOCAINE HYDROCHLORIDE AND EPINEPHRINE 2.5 ML: 10; 10 INJECTION, SOLUTION INFILTRATION; PERINEURAL at 12:03

## 2022-03-30 NOTE — PROGRESS NOTES
Post Partum Check:  Delivery at 37w3d now .  Date of delivery: 3/2/22    Patient concerns: none    Bleeding: no concerns. A little brownish discharge.     Pain: no concerns    LMP:Patient's last menstrual period was 2021.     Depression: no concerns  Postpartum Depression Screen :9  Item 10 (suicidal thoughts): Negative.  Interpretation Guide: Possible Depression = 10 or greater.   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 She does not have a degree but just helps other people. But now feels fine.  She does report some stress with mother having cervical cancer and being in and out of the hospital last several months.    Contraception Plan:  nexplanon- would like today    Immunizations needed:  none    Pap smear:  uptodate    Physical Exam:  Blood pressure 131/88, pulse 69, temperature 97.3  F (36.3  C), temperature source Temporal, weight 122 kg (269 lb), last menstrual period 2021, unknown if currently breastfeeding. Body mass index is 43.42 kg/m .  Heart: Regular rate and rhythm, no murmurs, rubs, or gallops.  Lungs: Clear to auscultation bilaterally.  No crackles.  Abdomen: Soft, nontender, bowel sounds normal.  No significant uterine tenderness.  Genitourinary: declined.  Extremities: Nontender, no significant edema.      Assessment and Plan     31 year old  here today for postpartum check.  1. Return to work  2. Pap smear up to date.  3. Contraception Plan: Nexplanon.  4. Postpartum Evaluation of Pregnancy/Chronic Health Conditions: IDGDM- non compliance. Would like to check A1c today- discussed hasn't been 3 months but she states she doesn't want to come back in 2 months. Will check today to ensure trending down at least.   5. Referrals: none.  6. Next physical exam due in one year.    Vijay was seen today for postpartum care and nexplanon insertion.    Diagnoses and all orders for this visit:    Routine  postpartum follow-up    Elevated BP without diagnosis of hypertension: Initial blood pressure was a little bit elevated today.  Repeat 131/88.  She is completely asymptomatic no headaches, vision changes, shortness of breath, epigastric pain or lower extremity swelling.  I do think she is at risk for chronic hypertension due to her morbid obesity.  Will obtain labs today to rule out preeclampsia is still within the window.  She will check her blood pressures at home twice-three times a week and update me if there is any blood pressures greater than 140/90.  -     CBC with platelets; Future  -     Hemoglobin A1c; Future  -     ALT; Future  -     AST; Future  -     Creatinine; Future  -     Protein  random urine; Future    Insertion of Nexplanon: Please see other note  -     lidocaine 1% with EPINEPHrine 1:100,000 injection 2.5 mL  -     etonogestrel (NEXPLANON) subdermal implant 68 mg  -     etonogestrel (NEXPLANON) 68 MG IMPL; 1 each (68 mg) by Subdermal route continuous  -     INSERTION NON-BIODEGRADABLE DRUG DELIVERY IMPLANT

## 2022-03-30 NOTE — PROGRESS NOTES
CC:  Here for Nexplanon insertion today   HPI:  Pt is a  31 year old  alert female who presents for Nexplanon insertion today. Reviewed with patient all contraceptive options including:  OCP, Ortho Evra, Nuvaring, Depo Provera, IUD-both Mirena and ParaGard, nexplanon, condoms and diaphragm.  After hearing all of her options, the patient chose Nexplanon.  We discussed risks and benefits of nexplanon  including:   the possible side effect of metrorrhagia with irregular spotting or bleeding within the first 3-6 months after insertion as well as the 20% risk of amenorrhea.  Other minor side effects were discussed including:  headache (16 percent), weight gain (12 percent), acne (12 percent), breast tenderness (10 percent), emotional lability (6 percent) and abdominal pain (5 percent), pain at insertion site.  Informed consent was obtained.  She is 4 weeks Postpartum- no pregnancy test today.      Patient's last menstrual period was 2021.  No Known Allergies             ROS:    Constitutional:  Denies Headache.  No weight changes.  No fevers or chills.    HEENT:  Denies vision changes or hearing changes. No sinus problems.  Respiratory:  No breathing issues, cough or shortness of breath  Cardiovascular:  Denies chest pain, syncope or palpitations.    GI:  Denies nausea, vomiting, diarrhea, or constipation  Musculoskeletal:  Denies joint pain, swelling, or erythema  Skin:  Denies rashes, significant lesions or pruritis.    PHYSICAL EXAM  /88   Pulse 69   Temp 97.3  F (36.3  C) (Temporal)   Wt 122 kg (269 lb)   LMP 2021   BMI 43.42 kg/m    GENERAL APPEARANCE:  The patient is a pleasant, normal appearing  female with normal affect and in no distress.    PROCEDURE:   A point approximately 9 cm from the medial epicondyle on the upper inner left arm was identified and cleaned with alcohol. This was injected with 2.5cc of 1% Lidocaine with epi along the planned insertion canal. The site was then  prepped with Betadyne. The Nexplanon insertion needle was removed from the sterile pack.  The Nexplanon was then inserted just underneath the surface of the skin in the recommended fashion.  Following removal of the insertion needle, the Nexplanon implant was palpated by both the patient and myself.   The insertion site was then covered with an adhesive bandage and then covered with a pressure bandage.  There were no complications and the patient tolerated the procedure well.      ASSESSMENT:  Nexplanon insertion    PLAN:  The paperwork was filled out and the user care was given to the patient.    The patient was advised to call if she experienced any significant pain, redness or swelling at the incision site or over the mela.    Follow up prn Nexplanon check.  Patient to leave pressure dressing in place for 24h.  Patient to leave adhesive bandage in place for 3 days.  Bleeding precautions given, patient is to call for any heavy bleeding > 1 pad per hour, severe pain or fevers.

## 2022-06-29 NOTE — PROGRESS NOTES
"ANNUAL       Chief Complaint   Patient presents with     Annual Exam       HISTORY OF PRESENT ILLNESS:  Pt is a 30 y.o.     alert female who presents today for an annual exam.    Concerns today: vaginal bulge? Prolapse?  Tried to have  look but he would not. Had some back pain- better now.    Contraception: She would like her Nexplanon removed today.  They would like to try for 1 more boy.  She is pretty sure that she wants that 1 to be the last one.  We did review sterilization options.    Std screening: Yes willam well    Healthy Habits:   Regular Exercise: Not really  Healthy Diet: Not really -falling into old habits.  Does fast intermittently  Dental Visits Regularly: Not since Covid will schedule  Seat Belt: Yes  Sexually active: Yes  Self Breast Exam Monthly: No    No LMP recorded. Patient has had an implant.    No Known Allergies    No current outpatient medications on file prior to visit.     No current facility-administered medications on file prior to visit.        Past Medical History:   Diagnosis Date     Gestational diabetes mellitus (GDM) 10/12/2016    insulin dependant for over 4 mos     Menarche 16 y/o    Menarche 16 y/o, menses qmo, bleeding x7 days (heavier since birth of 2nd child)      (normal spontaneous vaginal delivery) 2016      (normal spontaneous vaginal delivery) 40-6 10/30/2015    - 10/31/15:  40w6d [facility], labor 2h57m (push 8m, CHRIS), no pain meds, no lac, EBL 175mL (recd pitocin 10mU IM). GBS neg. Female BW 7#14.3 oz, L 20\", HC 13.5\", apgars 9/9. formula-feeding         Postpartum depression     possible postpartum depression, mild     UTI in pregnancy 2014    - 14 prenatal 36+wk GA: UCx >100k eColi pan-sensitive - 6/10/15 prenatal 20-3 (walk in clinic): UCx >100k eColi (R amp/tetracycline/TMP-SMX), treated with nitrofurantoin - 2015 prenatal 24w3d: sxs resolved, Ucx NEG  - 2015 MyChart message: c/o strong odor back again, " "advised NTBS and check urine culture and if again has UTI then will need to keep her on prophylactic antibiotics for duration of pregnancy//sds * 7/23/15 UCx NEG        Past Surgical History:   Procedure Laterality Date     LAPAROSCOPIC CHOLECYSTECTOMY N/A 10/2/2020    Procedure: CHOLECYSTECTOMY, LAPAROSCOPIC;  Surgeon: Malvin Chu MD;  Location: [facility];  Service: General     NO PAST SURGERIES         Social History     Socioeconomic History     Marital status: Single     Spouse name: Eliu Cohen     Number of children: 3     Years of education: [id number]     Highest education level: Not on file   Occupational History     Comment: 3/19/15: \"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\"   Social Needs     Financial resource strain: Not on file     Food insecurity     Worry: Not on file     Inability: Not on file     Transportation needs     Medical: Not on file     Non-medical: Not on file   Tobacco Use     Smoking status: Never Smoker     Smokeless tobacco: Never Used   Substance and Sexual Activity     Alcohol use: No     Comment: none     Drug use: No     Sexual activity: Yes     Partners: Male     Birth control/protection: None   Lifestyle     Physical activity     Days per week: Not on file     Minutes per session: Not on file     Stress: Not on file   Relationships     Social connections     Talks on phone: Not on file     Gets together: Not on file     Attends [employer] service: Not on file     Active member of club or organization: Not on file     Attends meetings of clubs or organizations: Not on file     Relationship status: Not on file     Intimate partner violence     Fear of current or ex partner: Not on file     Emotionally abused: Not on file     Physically abused: Not on file     Forced sexual activity: Not on file   Other Topics Concern     Not on file   Social History Narrative    Notes per Dr Loredo 6/4/2015 (updated 10/31/2015):         HOME ENVIRONMENT:    - 11/2015: Lives with " ", 3 children (son  11, daughter  14, daughter  10/31/15), no pets, apt with stairs, in [place] [facility]        MARITAL HISTORY:    - culturally  to Eliu Cohen since         EDUCATION:    - High School Grad, [employer] 2012    - CNA at [employer]     - [employer] x2 yrs \"generals\"        NATIVE LANGUAGE:    - HMONG = 1st language in home    - ENLISH fluent        HOBBIES/LEISURE ACTIVITIES:    - family time, music        PERSONAL HISTORY:    - Born in [place]. [place] , then moved to [place] . Family/parents originally from [place].        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, homemaker            Family History   Problem Relation Age of Onset     No Medical Problems Son          11     No Medical Problems Daughter          14     Stroke Paternal Grandfather 50        estimate 50s y/o     Hypertension Paternal Grandfather      Diabetes type II Paternal Grandmother         Pat Gma DM2, CAD     Heart disease Paternal Grandmother         Pat Gma DM2, CAD     Diabetes type II Paternal Uncle         insulin dependent DM2, obese     Gestational diabetes Cousin         GDM insulin dependent with 2nd pregnancy, obese     Diabetes type II Paternal Aunt      No Medical Problems Mother      No Medical Problems Father      No Medical Problems Sister      No Medical Problems Brother      No Medical Problems Sister      No Medical Problems Brother      No Medical Problems Brother      Cancer Neg Hx        OB History        5    Para   5    Term   5       0    AB   0    Living   5       SAB   0    TAB   0    Ectopic   0    Multiple   0    Live Births   5                 GYNECOLOGIC HISTORY:    Mao Her has no history of STDs.  Mao Her has known history of abnormal Pap smears.   Last pap result was normal .  She would like to do Pap today before she gets " "pregnant.    REVIEW OF SYSTEMS:    Constitutional:  Denies Headache.  No weight changes.  No fevers or chills.    HEENT:  Denies vision changes or hearing changes. No sinus problems.  Breasts:  Denies breast masses, pain or nipple discharge.    Respiratory:  No breathing issues, cough or shortness of breath  Cardiovascular:  Denies chest pain, syncope or palpitations.    GI:  Denies nausea, vomiting, diarrhea, or constipation  Endocrine:  Denies hot flashes, night sweats, heat or cold intolerance.  Hematologic:  Denies easy bruising or bleeding disorders.  Allergies/Immunologic:  Denies seasonal allergies or any history of immunologic disorders  Neurologic:  Normal sensation and motor control.  No history of seizures or syncope.  Musculoskeletal: Back pain the last couple weeks but better now.  Skin:  Denies rashes, significant lesions or pruritis.  Psychiatric:  Denies anxiety, depression, memory deficits,    PHYSICAL EXAM  /80 (Patient Site: Left Arm, Patient Position: Sitting, Cuff Size: Adult Large)   Pulse 77   Temp 97.7  F (36.5  C) (Temporal)   Resp 18   Ht 5' 6\" (1.676 m)   Wt (!) 256 lb (116.1 kg)   BMI 41.32 kg/m    GENERAL APPEARANCE:  The patient is a pleasant, normal appearing female with normal affect and in no distress.  HEENT: Normocephalic atraumatic.  PERRL, ocular muscles intact.  No conjunctivitis or icterus noticed.  TMs clear with good cone of light reflex.  Oropharynx clear and moist mucous membranes.  NECK: supple.  No cervical lymphadenopathy.  No thyromegaly, no nodules palpated, trachea midline.  LUNGS: Clear bilaterally with normal respiratory effort  HEART:   Regular rate and rhythm.  No murmurs noted.  Pulses are full and symmetrical.  BREASTS: Declined-no time today  ABDOMEN: Soft, non-tender, non-distended.  No hepatosplenomegaly.  Normal bowel sounds.  No umbilical or inguinal hernias.  SKIN:  Warm and dry to touch.  No lesions or rashes noted.    PSYCHIATRIC/NEUROLOGIC:  " Appropriate mood and affect, normal recall, alert and oriented x 3  EXTREMITIES:  Warm and well perfused.  No edema noted.  Muscle strength and sensation are normal bilaterally 5/5 in both upper and lower extremities.   :  Vulva: Inspection of her external genitalia reveals normal mons pubis, labia minora and labia majora.  Normal appearing clitoris, urethral meatus and [place]'s glands.    Bladder:  No evidence of urethral or bladder tenderness.    Vagina:  Speculum exam reveals pink and moist vaginal mucosa.  Bartholin gland is normal to palpation.  Cervix:  Cervix is normal in appearance with no lesions.  There is no cervical motion tenderness.  Uterus:  Uterus is normal size, mobile and non-tender.  No adnexal masses are palpated.  Adnexa are non-tender to palpation  Perineum:  Perineum appears normal.  Anus:  Normal with no apparent lesions.        Procedure:  Verbal and written consent was obtained.     Left upper inner arm was adequately anesthetized with 1.5 cc of lidocaine with Epi.  Then, using sterile technique, 5 mm incision was made with an 11 blade.  Nexplanon was palpated subcutaneously and removed with a hemostat clamp.  Incision site was closed with steri-strips and wrapped with a pressure bandage.  Patient was neurovascularly intact after exam.  Proper wound aftercare was dicussed with patient.      PHQ9:          Vijay was seen today for annual exam.    Diagnoses and all orders for this visit:    Annual physical exam:   Self breast exam risks/benefits reviewed  Safe sex practices reviewed with patient  Contraception reviewed and she requests Nexplanon removal today and desires pregnancy  HPV testing dicussed and new Pap smear recommendations reviewed with patient-discussed she is not due till next year for her Pap but she like to do today before she gets pregnant  STD screening per below  -     Gynecologic Cytology (PAP Smear)  -     Neisseria gonorrhoeae by PCR  -     Chlamydia trachomatis by  PCR  -     Wet Prep, Vaginal    Morbid obesity (H): Screening labs today.  Reviewed diet and exercise changes.  Discussed healthy ways to do this.  -     Glycosylated Hemoglobin A1c  -     Basic Metabolic Panel  -     Hepatic Profile  -     Lipid Sterling FASTING    Encounter for Nexplanon removal: See procedure note above  -     lidocaine 1%-EPINEPHrine 1:100,000 1 %-1:100,000 injection 1.5 mL (XYLOCAINE W/EPI)    Patient desires pregnancy:  Start prenatal vitamin        Maggy Guerrero   Topical Anesthesia?: LMX

## 2022-07-02 ENCOUNTER — HOSPITAL ENCOUNTER (INPATIENT)
Facility: CLINIC | Age: 32
LOS: 3 days | Discharge: HOME | DRG: 872 | End: 2022-07-06
Attending: STUDENT IN AN ORGANIZED HEALTH CARE EDUCATION/TRAINING PROGRAM | Admitting: INTERNAL MEDICINE
Payer: COMMERCIAL

## 2022-07-02 ENCOUNTER — APPOINTMENT (OUTPATIENT)
Dept: CT IMAGING | Facility: CLINIC | Age: 32
DRG: 872 | End: 2022-07-02
Attending: STUDENT IN AN ORGANIZED HEALTH CARE EDUCATION/TRAINING PROGRAM
Payer: COMMERCIAL

## 2022-07-02 DIAGNOSIS — M54.50 CHRONIC LOW BACK PAIN, UNSPECIFIED BACK PAIN LATERALITY, UNSPECIFIED WHETHER SCIATICA PRESENT: ICD-10-CM

## 2022-07-02 DIAGNOSIS — E53.8 VITAMIN B12 DEFICIENCY (NON ANEMIC): Primary | ICD-10-CM

## 2022-07-02 DIAGNOSIS — N12 PYELONEPHRITIS: ICD-10-CM

## 2022-07-02 DIAGNOSIS — G89.29 CHRONIC LOW BACK PAIN, UNSPECIFIED BACK PAIN LATERALITY, UNSPECIFIED WHETHER SCIATICA PRESENT: ICD-10-CM

## 2022-07-02 DIAGNOSIS — R42 LIGHTHEADEDNESS: ICD-10-CM

## 2022-07-02 LAB
ALBUMIN SERPL-MCNC: 3.5 G/DL (ref 3.5–5)
ALBUMIN UR-MCNC: 300 MG/DL
ALP SERPL-CCNC: 43 U/L (ref 45–120)
ALT SERPL W P-5'-P-CCNC: 29 U/L (ref 0–45)
ANION GAP SERPL CALCULATED.3IONS-SCNC: 10 MMOL/L (ref 5–18)
APPEARANCE UR: ABNORMAL
AST SERPL W P-5'-P-CCNC: 28 U/L (ref 0–40)
BACTERIA #/AREA URNS HPF: ABNORMAL /HPF
BILIRUB SERPL-MCNC: 2.6 MG/DL (ref 0–1)
BILIRUB UR QL STRIP: NEGATIVE
BUN SERPL-MCNC: 10 MG/DL (ref 8–22)
CALCIUM SERPL-MCNC: 9 MG/DL (ref 8.5–10.5)
CHLORIDE BLD-SCNC: 102 MMOL/L (ref 98–107)
CLUE CELLS: NORMAL
CO2 SERPL-SCNC: 24 MMOL/L (ref 22–31)
COLOR UR AUTO: YELLOW
CREAT SERPL-MCNC: 1.23 MG/DL (ref 0.6–1.1)
ERYTHROCYTE [DISTWIDTH] IN BLOOD BY AUTOMATED COUNT: 13.1 % (ref 10–15)
FLUAV RNA SPEC QL NAA+PROBE: NEGATIVE
FLUBV RNA RESP QL NAA+PROBE: NEGATIVE
GFR SERPL CREATININE-BSD FRML MDRD: 60 ML/MIN/1.73M2
GLUCOSE BLD-MCNC: 306 MG/DL (ref 70–125)
GLUCOSE UR STRIP-MCNC: >1000 MG/DL
HCG UR QL: NEGATIVE
HCT VFR BLD AUTO: 38.9 % (ref 35–47)
HGB BLD-MCNC: 12.7 G/DL (ref 11.7–15.7)
HGB UR QL STRIP: ABNORMAL
KETONES UR STRIP-MCNC: ABNORMAL MG/DL
LACTATE SERPL-SCNC: 2.5 MMOL/L (ref 0.7–2)
LEUKOCYTE ESTERASE UR QL STRIP: ABNORMAL
MCH RBC QN AUTO: 25.8 PG (ref 26.5–33)
MCHC RBC AUTO-ENTMCNC: 32.6 G/DL (ref 31.5–36.5)
MCV RBC AUTO: 79 FL (ref 78–100)
MUCOUS THREADS #/AREA URNS LPF: PRESENT /LPF
NITRATE UR QL: POSITIVE
PH UR STRIP: 6 [PH] (ref 5–7)
PLATELET # BLD AUTO: 140 10E3/UL (ref 150–450)
POTASSIUM BLD-SCNC: 3.1 MMOL/L (ref 3.5–5)
PROT SERPL-MCNC: 7.6 G/DL (ref 6–8)
RBC # BLD AUTO: 4.93 10E6/UL (ref 3.8–5.2)
RBC URINE: 12 /HPF
RSV RNA SPEC NAA+PROBE: NEGATIVE
SARS-COV-2 RNA RESP QL NAA+PROBE: NEGATIVE
SODIUM SERPL-SCNC: 136 MMOL/L (ref 136–145)
SP GR UR STRIP: 1.03 (ref 1–1.03)
SQUAMOUS EPITHELIAL: 3 /HPF
TRICHOMONAS, WET PREP: NORMAL
UROBILINOGEN UR STRIP-MCNC: <2 MG/DL
WBC # BLD AUTO: 6.9 10E3/UL (ref 4–11)
WBC URINE: >182 /HPF
WBC'S/HIGH POWER FIELD, WET PREP: NORMAL
YEAST, WET PREP: NORMAL

## 2022-07-02 PROCEDURE — 96366 THER/PROPH/DIAG IV INF ADDON: CPT

## 2022-07-02 PROCEDURE — 81001 URINALYSIS AUTO W/SCOPE: CPT | Performed by: STUDENT IN AN ORGANIZED HEALTH CARE EDUCATION/TRAINING PROGRAM

## 2022-07-02 PROCEDURE — 36415 COLL VENOUS BLD VENIPUNCTURE: CPT | Performed by: STUDENT IN AN ORGANIZED HEALTH CARE EDUCATION/TRAINING PROGRAM

## 2022-07-02 PROCEDURE — 250N000013 HC RX MED GY IP 250 OP 250 PS 637: Performed by: STUDENT IN AN ORGANIZED HEALTH CARE EDUCATION/TRAINING PROGRAM

## 2022-07-02 PROCEDURE — 87186 SC STD MICRODIL/AGAR DIL: CPT | Performed by: STUDENT IN AN ORGANIZED HEALTH CARE EDUCATION/TRAINING PROGRAM

## 2022-07-02 PROCEDURE — 81025 URINE PREGNANCY TEST: CPT | Performed by: STUDENT IN AN ORGANIZED HEALTH CARE EDUCATION/TRAINING PROGRAM

## 2022-07-02 PROCEDURE — 80053 COMPREHEN METABOLIC PANEL: CPT | Performed by: STUDENT IN AN ORGANIZED HEALTH CARE EDUCATION/TRAINING PROGRAM

## 2022-07-02 PROCEDURE — 87077 CULTURE AEROBIC IDENTIFY: CPT | Performed by: STUDENT IN AN ORGANIZED HEALTH CARE EDUCATION/TRAINING PROGRAM

## 2022-07-02 PROCEDURE — 250N000011 HC RX IP 250 OP 636: Performed by: STUDENT IN AN ORGANIZED HEALTH CARE EDUCATION/TRAINING PROGRAM

## 2022-07-02 PROCEDURE — 74177 CT ABD & PELVIS W/CONTRAST: CPT

## 2022-07-02 PROCEDURE — 83036 HEMOGLOBIN GLYCOSYLATED A1C: CPT | Performed by: INTERNAL MEDICINE

## 2022-07-02 PROCEDURE — 85027 COMPLETE CBC AUTOMATED: CPT | Performed by: STUDENT IN AN ORGANIZED HEALTH CARE EDUCATION/TRAINING PROGRAM

## 2022-07-02 PROCEDURE — 87149 DNA/RNA DIRECT PROBE: CPT | Performed by: STUDENT IN AN ORGANIZED HEALTH CARE EDUCATION/TRAINING PROGRAM

## 2022-07-02 PROCEDURE — 83605 ASSAY OF LACTIC ACID: CPT | Performed by: STUDENT IN AN ORGANIZED HEALTH CARE EDUCATION/TRAINING PROGRAM

## 2022-07-02 PROCEDURE — C9803 HOPD COVID-19 SPEC COLLECT: HCPCS

## 2022-07-02 PROCEDURE — 93005 ELECTROCARDIOGRAM TRACING: CPT | Performed by: STUDENT IN AN ORGANIZED HEALTH CARE EDUCATION/TRAINING PROGRAM

## 2022-07-02 PROCEDURE — 258N000003 HC RX IP 258 OP 636: Performed by: STUDENT IN AN ORGANIZED HEALTH CARE EDUCATION/TRAINING PROGRAM

## 2022-07-02 PROCEDURE — 96365 THER/PROPH/DIAG IV INF INIT: CPT | Mod: 59

## 2022-07-02 PROCEDURE — 87637 SARSCOV2&INF A&B&RSV AMP PRB: CPT | Performed by: STUDENT IN AN ORGANIZED HEALTH CARE EDUCATION/TRAINING PROGRAM

## 2022-07-02 PROCEDURE — 87210 SMEAR WET MOUNT SALINE/INK: CPT | Performed by: STUDENT IN AN ORGANIZED HEALTH CARE EDUCATION/TRAINING PROGRAM

## 2022-07-02 PROCEDURE — 99285 EMERGENCY DEPT VISIT HI MDM: CPT | Mod: 25

## 2022-07-02 RX ORDER — IOPAMIDOL 755 MG/ML
100 INJECTION, SOLUTION INTRAVASCULAR ONCE
Status: COMPLETED | OUTPATIENT
Start: 2022-07-02 | End: 2022-07-02

## 2022-07-02 RX ORDER — ACETAMINOPHEN 325 MG/1
650 TABLET ORAL ONCE
Status: COMPLETED | OUTPATIENT
Start: 2022-07-02 | End: 2022-07-02

## 2022-07-02 RX ORDER — POTASSIUM CHLORIDE 1500 MG/1
40 TABLET, EXTENDED RELEASE ORAL ONCE
Status: COMPLETED | OUTPATIENT
Start: 2022-07-02 | End: 2022-07-02

## 2022-07-02 RX ORDER — CEFTRIAXONE 2 G/1
2 INJECTION, POWDER, FOR SOLUTION INTRAMUSCULAR; INTRAVENOUS ONCE
Status: COMPLETED | OUTPATIENT
Start: 2022-07-02 | End: 2022-07-03

## 2022-07-02 RX ADMIN — ACETAMINOPHEN 650 MG: 325 TABLET ORAL at 20:33

## 2022-07-02 RX ADMIN — IOPAMIDOL 100 ML: 755 INJECTION, SOLUTION INTRAVENOUS at 22:57

## 2022-07-02 RX ADMIN — SODIUM CHLORIDE 1000 ML: 9 INJECTION, SOLUTION INTRAVENOUS at 22:24

## 2022-07-02 RX ADMIN — CEFTRIAXONE SODIUM 2 G: 2 INJECTION, POWDER, FOR SOLUTION INTRAMUSCULAR; INTRAVENOUS at 22:34

## 2022-07-02 RX ADMIN — POTASSIUM CHLORIDE 40 MEQ: 1500 TABLET, EXTENDED RELEASE ORAL at 22:23

## 2022-07-02 ASSESSMENT — ENCOUNTER SYMPTOMS
LIGHT-HEADEDNESS: 1
SHORTNESS OF BREATH: 0
DIARRHEA: 0
COUGH: 0
HEMATURIA: 0
RHINORRHEA: 0
ABDOMINAL PAIN: 0
CHILLS: 1
SORE THROAT: 0
FREQUENCY: 1
FEVER: 1
DYSURIA: 0

## 2022-07-02 NOTE — LETTER
[address] [address]  Phone: [phone]  Fax: [phone]    July 6, 2022        Vijay Her  [address] [place] [address]          To whom it may concern:    RE: Vijay Her    Patient was seen and treated by me today at our hospital.  She has been under our care since 7/2/2022.    Please contact me for questions or concerns.      Sincerely,        Zach Merritt MD

## 2022-07-03 PROBLEM — N12 PYELONEPHRITIS: Status: ACTIVE | Noted: 2022-07-03

## 2022-07-03 PROBLEM — R42 LIGHTHEADEDNESS: Status: ACTIVE | Noted: 2022-07-03

## 2022-07-03 LAB
ACINETOBACTER SPECIES: NOT DETECTED
ALBUMIN SERPL-MCNC: 2.9 G/DL (ref 3.5–5)
ALBUMIN SERPL-MCNC: 3 G/DL (ref 3.5–5)
ALP SERPL-CCNC: 37 U/L (ref 45–120)
ALP SERPL-CCNC: 41 U/L (ref 45–120)
ALT SERPL W P-5'-P-CCNC: 24 U/L (ref 0–45)
ALT SERPL W P-5'-P-CCNC: 26 U/L (ref 0–45)
ANION GAP SERPL CALCULATED.3IONS-SCNC: 9 MMOL/L (ref 5–18)
ANION GAP SERPL CALCULATED.3IONS-SCNC: 9 MMOL/L (ref 5–18)
AST SERPL W P-5'-P-CCNC: 24 U/L (ref 0–40)
AST SERPL W P-5'-P-CCNC: 26 U/L (ref 0–40)
ATRIAL RATE - MUSE: 111 BPM
BASOPHILS # BLD AUTO: 0 10E3/UL (ref 0–0.2)
BASOPHILS NFR BLD AUTO: 0 %
BILIRUB SERPL-MCNC: 1.6 MG/DL (ref 0–1)
BILIRUB SERPL-MCNC: 1.6 MG/DL (ref 0–1)
BUN SERPL-MCNC: 8 MG/DL (ref 8–22)
BUN SERPL-MCNC: 9 MG/DL (ref 8–22)
CALCIUM SERPL-MCNC: 7.9 MG/DL (ref 8.5–10.5)
CALCIUM SERPL-MCNC: 8.1 MG/DL (ref 8.5–10.5)
CHLORIDE BLD-SCNC: 107 MMOL/L (ref 98–107)
CHLORIDE BLD-SCNC: 109 MMOL/L (ref 98–107)
CITROBACTER SPECIES: NOT DETECTED
CO2 SERPL-SCNC: 18 MMOL/L (ref 22–31)
CO2 SERPL-SCNC: 20 MMOL/L (ref 22–31)
CREAT SERPL-MCNC: 0.82 MG/DL (ref 0.6–1.1)
CREAT SERPL-MCNC: 0.91 MG/DL (ref 0.6–1.1)
CREAT SERPL-MCNC: 0.96 MG/DL (ref 0.6–1.1)
CTX-M: NOT DETECTED
DIASTOLIC BLOOD PRESSURE - MUSE: NORMAL MMHG
ENTEROBACTER SPECIES: NOT DETECTED
EOSINOPHIL # BLD AUTO: 0 10E3/UL (ref 0–0.7)
EOSINOPHIL NFR BLD AUTO: 0 %
ERYTHROCYTE [DISTWIDTH] IN BLOOD BY AUTOMATED COUNT: 13.1 % (ref 10–15)
ESCHERICHIA COLI: DETECTED
FERRITIN SERPL-MCNC: 210 NG/ML (ref 6–175)
GFR SERPL CREATININE-BSD FRML MDRD: 81 ML/MIN/1.73M2
GFR SERPL CREATININE-BSD FRML MDRD: 87 ML/MIN/1.73M2
GFR SERPL CREATININE-BSD FRML MDRD: >90 ML/MIN/1.73M2
GLUCOSE BLD-MCNC: 216 MG/DL (ref 70–125)
GLUCOSE BLD-MCNC: 229 MG/DL (ref 70–125)
GLUCOSE BLDC GLUCOMTR-MCNC: 143 MG/DL (ref 70–99)
GLUCOSE BLDC GLUCOMTR-MCNC: 170 MG/DL (ref 70–99)
GLUCOSE BLDC GLUCOMTR-MCNC: 203 MG/DL (ref 70–99)
GLUCOSE BLDC GLUCOMTR-MCNC: 206 MG/DL (ref 70–99)
GLUCOSE BLDC GLUCOMTR-MCNC: 221 MG/DL (ref 70–99)
HBA1C MFR BLD: 8 %
HCT VFR BLD AUTO: 31.2 % (ref 35–47)
HGB BLD-MCNC: 10.4 G/DL (ref 11.7–15.7)
HOLD SPECIMEN: NORMAL
IMM GRANULOCYTES # BLD: 0 10E3/UL
IMM GRANULOCYTES NFR BLD: 1 %
IMP: NOT DETECTED
INTERPRETATION ECG - MUSE: NORMAL
IRON BINDING CAPACITY (ROCHE): 192 UG/DL (ref 240–430)
IRON SATN MFR SERPL: 4 % (ref 15–46)
IRON SERPL-MCNC: 7 UG/DL (ref 37–145)
KLEBSIELLA OXYTOCA: NOT DETECTED
KLEBSIELLA PNEUMONIAE: NOT DETECTED
KPC: NOT DETECTED
LACTATE SERPL-SCNC: 1.2 MMOL/L (ref 0.7–2)
LYMPHOCYTES # BLD AUTO: 0.5 10E3/UL (ref 0.8–5.3)
LYMPHOCYTES NFR BLD AUTO: 9 %
MAGNESIUM SERPL-MCNC: 1.4 MG/DL (ref 1.8–2.6)
MAGNESIUM SERPL-MCNC: 1.6 MG/DL (ref 1.8–2.6)
MAGNESIUM SERPL-MCNC: 2.5 MG/DL (ref 1.8–2.6)
MCH RBC QN AUTO: 26.1 PG (ref 26.5–33)
MCHC RBC AUTO-ENTMCNC: 33.3 G/DL (ref 31.5–36.5)
MCV RBC AUTO: 78 FL (ref 78–100)
MONOCYTES # BLD AUTO: 0.5 10E3/UL (ref 0–1.3)
MONOCYTES NFR BLD AUTO: 8 %
NDM: NOT DETECTED
NEUTROPHILS # BLD AUTO: 4.8 10E3/UL (ref 1.6–8.3)
NEUTROPHILS NFR BLD AUTO: 82 %
NRBC # BLD AUTO: 0 10E3/UL
NRBC BLD AUTO-RTO: 0 /100
OXA (DETECTED/NOT DETECTED): NOT DETECTED
P AXIS - MUSE: 51 DEGREES
PLATELET # BLD AUTO: 108 10E3/UL (ref 150–450)
POTASSIUM BLD-SCNC: 3 MMOL/L (ref 3.5–5)
POTASSIUM BLD-SCNC: 3.4 MMOL/L (ref 3.5–5)
POTASSIUM BLD-SCNC: 4.4 MMOL/L (ref 3.5–5)
PR INTERVAL - MUSE: 160 MS
PROCALCITONIN SERPL-MCNC: 2.6 NG/ML (ref 0–0.49)
PROT SERPL-MCNC: 6.2 G/DL (ref 6–8)
PROT SERPL-MCNC: 6.5 G/DL (ref 6–8)
PROTEUS SPECIES: NOT DETECTED
PSEUDOMONAS AERUGINOSA: NOT DETECTED
QRS DURATION - MUSE: 96 MS
QT - MUSE: 338 MS
QTC - MUSE: 459 MS
R AXIS - MUSE: -76 DEGREES
RBC # BLD AUTO: 3.99 10E6/UL (ref 3.8–5.2)
SODIUM SERPL-SCNC: 136 MMOL/L (ref 136–145)
SODIUM SERPL-SCNC: 136 MMOL/L (ref 136–145)
SYSTOLIC BLOOD PRESSURE - MUSE: NORMAL MMHG
T AXIS - MUSE: 38 DEGREES
VENTRICULAR RATE- MUSE: 111 BPM
VIM: NOT DETECTED
VIT B12 SERPL-MCNC: 199 PG/ML (ref 232–1245)
WBC # BLD AUTO: 5.8 10E3/UL (ref 4–11)

## 2022-07-03 PROCEDURE — 84145 PROCALCITONIN (PCT): CPT | Performed by: INTERNAL MEDICINE

## 2022-07-03 PROCEDURE — 94660 CPAP INITIATION&MGMT: CPT

## 2022-07-03 PROCEDURE — 250N000011 HC RX IP 250 OP 636: Performed by: INTERNAL MEDICINE

## 2022-07-03 PROCEDURE — 85025 COMPLETE CBC W/AUTO DIFF WBC: CPT | Performed by: INTERNAL MEDICINE

## 2022-07-03 PROCEDURE — 83550 IRON BINDING TEST: CPT | Performed by: INTERNAL MEDICINE

## 2022-07-03 PROCEDURE — 120N000001 HC R&B MED SURG/OB

## 2022-07-03 PROCEDURE — 82728 ASSAY OF FERRITIN: CPT | Performed by: INTERNAL MEDICINE

## 2022-07-03 PROCEDURE — 36415 COLL VENOUS BLD VENIPUNCTURE: CPT | Performed by: INTERNAL MEDICINE

## 2022-07-03 PROCEDURE — 36415 COLL VENOUS BLD VENIPUNCTURE: CPT | Performed by: STUDENT IN AN ORGANIZED HEALTH CARE EDUCATION/TRAINING PROGRAM

## 2022-07-03 PROCEDURE — 250N000013 HC RX MED GY IP 250 OP 250 PS 637: Performed by: INTERNAL MEDICINE

## 2022-07-03 PROCEDURE — 99223 1ST HOSP IP/OBS HIGH 75: CPT | Performed by: INTERNAL MEDICINE

## 2022-07-03 PROCEDURE — 83605 ASSAY OF LACTIC ACID: CPT | Performed by: STUDENT IN AN ORGANIZED HEALTH CARE EDUCATION/TRAINING PROGRAM

## 2022-07-03 PROCEDURE — 250N000013 HC RX MED GY IP 250 OP 250 PS 637: Performed by: STUDENT IN AN ORGANIZED HEALTH CARE EDUCATION/TRAINING PROGRAM

## 2022-07-03 PROCEDURE — 258N000003 HC RX IP 258 OP 636: Performed by: STUDENT IN AN ORGANIZED HEALTH CARE EDUCATION/TRAINING PROGRAM

## 2022-07-03 PROCEDURE — 99207 PR NO BILLABLE SERVICE THIS VISIT: CPT | Performed by: INTERNAL MEDICINE

## 2022-07-03 PROCEDURE — 96361 HYDRATE IV INFUSION ADD-ON: CPT

## 2022-07-03 PROCEDURE — 87040 BLOOD CULTURE FOR BACTERIA: CPT | Performed by: INTERNAL MEDICINE

## 2022-07-03 PROCEDURE — 83735 ASSAY OF MAGNESIUM: CPT | Performed by: INTERNAL MEDICINE

## 2022-07-03 PROCEDURE — 84155 ASSAY OF PROTEIN SERUM: CPT | Performed by: INTERNAL MEDICINE

## 2022-07-03 PROCEDURE — 999N000157 HC STATISTIC RCP TIME EA 10 MIN

## 2022-07-03 PROCEDURE — 250N000012 HC RX MED GY IP 250 OP 636 PS 637: Performed by: INTERNAL MEDICINE

## 2022-07-03 PROCEDURE — 82607 VITAMIN B-12: CPT | Performed by: INTERNAL MEDICINE

## 2022-07-03 PROCEDURE — 80053 COMPREHEN METABOLIC PANEL: CPT | Performed by: INTERNAL MEDICINE

## 2022-07-03 PROCEDURE — 258N000002 HC RX IP 258 OP 250: Performed by: INTERNAL MEDICINE

## 2022-07-03 PROCEDURE — 258N000003 HC RX IP 258 OP 636: Performed by: INTERNAL MEDICINE

## 2022-07-03 PROCEDURE — 84132 ASSAY OF SERUM POTASSIUM: CPT | Performed by: INTERNAL MEDICINE

## 2022-07-03 RX ORDER — ACETAMINOPHEN 325 MG/1
650 TABLET ORAL EVERY 4 HOURS PRN
Status: DISCONTINUED | OUTPATIENT
Start: 2022-07-03 | End: 2022-07-06 | Stop reason: HOSPADM

## 2022-07-03 RX ORDER — IBUPROFEN 400 MG/1
400 TABLET, FILM COATED ORAL EVERY 6 HOURS PRN
Status: DISCONTINUED | OUTPATIENT
Start: 2022-07-03 | End: 2022-07-06 | Stop reason: HOSPADM

## 2022-07-03 RX ORDER — ACETAMINOPHEN 325 MG/1
975 TABLET ORAL ONCE
Status: COMPLETED | OUTPATIENT
Start: 2022-07-03 | End: 2022-07-03

## 2022-07-03 RX ORDER — ACETAMINOPHEN 650 MG/1
650 SUPPOSITORY RECTAL EVERY 6 HOURS PRN
Status: DISCONTINUED | OUTPATIENT
Start: 2022-07-03 | End: 2022-07-03

## 2022-07-03 RX ORDER — ACETAMINOPHEN 325 MG/1
650 TABLET ORAL EVERY 6 HOURS PRN
Status: DISCONTINUED | OUTPATIENT
Start: 2022-07-03 | End: 2022-07-03

## 2022-07-03 RX ORDER — ENOXAPARIN SODIUM 100 MG/ML
40 INJECTION SUBCUTANEOUS EVERY 12 HOURS
Status: DISCONTINUED | OUTPATIENT
Start: 2022-07-03 | End: 2022-07-06 | Stop reason: HOSPADM

## 2022-07-03 RX ORDER — ACETAMINOPHEN 650 MG/1
650 SUPPOSITORY RECTAL EVERY 6 HOURS PRN
Status: DISCONTINUED | OUTPATIENT
Start: 2022-07-03 | End: 2022-07-06 | Stop reason: HOSPADM

## 2022-07-03 RX ORDER — SODIUM CHLORIDE AND POTASSIUM CHLORIDE 150; 900 MG/100ML; MG/100ML
INJECTION, SOLUTION INTRAVENOUS CONTINUOUS
Status: DISCONTINUED | OUTPATIENT
Start: 2022-07-03 | End: 2022-07-03

## 2022-07-03 RX ORDER — MAGNESIUM SULFATE 4 G/50ML
4 INJECTION INTRAVENOUS ONCE
Status: COMPLETED | OUTPATIENT
Start: 2022-07-03 | End: 2022-07-03

## 2022-07-03 RX ORDER — METHOCARBAMOL 500 MG/1
500 TABLET, FILM COATED ORAL 3 TIMES DAILY PRN
Status: DISCONTINUED | OUTPATIENT
Start: 2022-07-03 | End: 2022-07-06 | Stop reason: HOSPADM

## 2022-07-03 RX ORDER — DEXTROSE MONOHYDRATE 25 G/50ML
25-50 INJECTION, SOLUTION INTRAVENOUS
Status: DISCONTINUED | OUTPATIENT
Start: 2022-07-03 | End: 2022-07-06 | Stop reason: HOSPADM

## 2022-07-03 RX ORDER — CEFTRIAXONE 2 G/1
2 INJECTION, POWDER, FOR SOLUTION INTRAMUSCULAR; INTRAVENOUS EVERY 24 HOURS
Status: DISCONTINUED | OUTPATIENT
Start: 2022-07-03 | End: 2022-07-03

## 2022-07-03 RX ORDER — POTASSIUM CHLORIDE 1500 MG/1
20 TABLET, EXTENDED RELEASE ORAL ONCE
Status: COMPLETED | OUTPATIENT
Start: 2022-07-03 | End: 2022-07-03

## 2022-07-03 RX ORDER — CEFDINIR 300 MG/1
300 CAPSULE ORAL 2 TIMES DAILY
Qty: 28 CAPSULE | Refills: 0 | Status: SHIPPED | OUTPATIENT
Start: 2022-07-03 | End: 2022-07-06

## 2022-07-03 RX ORDER — LIDOCAINE 40 MG/G
CREAM TOPICAL
Status: DISCONTINUED | OUTPATIENT
Start: 2022-07-03 | End: 2022-07-06 | Stop reason: HOSPADM

## 2022-07-03 RX ORDER — ACETAMINOPHEN 325 MG/1
650 TABLET ORAL ONCE
Status: COMPLETED | OUTPATIENT
Start: 2022-07-03 | End: 2022-07-03

## 2022-07-03 RX ORDER — NICOTINE POLACRILEX 4 MG
15-30 LOZENGE BUCCAL
Status: DISCONTINUED | OUTPATIENT
Start: 2022-07-03 | End: 2022-07-06 | Stop reason: HOSPADM

## 2022-07-03 RX ORDER — POTASSIUM CHLORIDE 1500 MG/1
40 TABLET, EXTENDED RELEASE ORAL ONCE
Status: COMPLETED | OUTPATIENT
Start: 2022-07-03 | End: 2022-07-03

## 2022-07-03 RX ORDER — LIDOCAINE 40 MG/G
CREAM TOPICAL
Status: DISCONTINUED | OUTPATIENT
Start: 2022-07-03 | End: 2022-07-05

## 2022-07-03 RX ORDER — SODIUM CHLORIDE 450 MG/100ML
INJECTION, SOLUTION INTRAVENOUS CONTINUOUS
Status: DISCONTINUED | OUTPATIENT
Start: 2022-07-03 | End: 2022-07-05

## 2022-07-03 RX ADMIN — IBUPROFEN 400 MG: 400 TABLET ORAL at 16:16

## 2022-07-03 RX ADMIN — SODIUM CHLORIDE: 4.5 INJECTION, SOLUTION INTRAVENOUS at 22:05

## 2022-07-03 RX ADMIN — CEFEPIME HYDROCHLORIDE 2 G: 2 INJECTION, POWDER, FOR SOLUTION INTRAVENOUS at 11:34

## 2022-07-03 RX ADMIN — ACETAMINOPHEN 975 MG: 325 TABLET ORAL at 05:41

## 2022-07-03 RX ADMIN — POTASSIUM CHLORIDE 20 MEQ: 1500 TABLET, EXTENDED RELEASE ORAL at 05:41

## 2022-07-03 RX ADMIN — ENOXAPARIN SODIUM 40 MG: 100 INJECTION SUBCUTANEOUS at 13:59

## 2022-07-03 RX ADMIN — ENOXAPARIN SODIUM 40 MG: 100 INJECTION SUBCUTANEOUS at 02:37

## 2022-07-03 RX ADMIN — SODIUM CHLORIDE 500 ML: 9 INJECTION, SOLUTION INTRAVENOUS at 03:47

## 2022-07-03 RX ADMIN — POTASSIUM CHLORIDE 40 MEQ: 1500 TABLET, EXTENDED RELEASE ORAL at 04:14

## 2022-07-03 RX ADMIN — CEFEPIME HYDROCHLORIDE 2 G: 2 INJECTION, POWDER, FOR SOLUTION INTRAVENOUS at 03:53

## 2022-07-03 RX ADMIN — INSULIN ASPART 1 UNITS: 100 INJECTION, SOLUTION INTRAVENOUS; SUBCUTANEOUS at 12:59

## 2022-07-03 RX ADMIN — POTASSIUM CHLORIDE AND SODIUM CHLORIDE: 900; 150 INJECTION, SOLUTION INTRAVENOUS at 03:47

## 2022-07-03 RX ADMIN — ACETAMINOPHEN 650 MG: 325 TABLET ORAL at 13:58

## 2022-07-03 RX ADMIN — ACETAMINOPHEN 650 MG: 325 TABLET ORAL at 01:30

## 2022-07-03 RX ADMIN — SODIUM CHLORIDE: 4.5 INJECTION, SOLUTION INTRAVENOUS at 09:57

## 2022-07-03 RX ADMIN — INSULIN ASPART 1 UNITS: 100 INJECTION, SOLUTION INTRAVENOUS; SUBCUTANEOUS at 17:38

## 2022-07-03 RX ADMIN — MAGNESIUM SULFATE HEPTAHYDRATE 4 G: 4 INJECTION, SOLUTION INTRAVENOUS at 04:00

## 2022-07-03 RX ADMIN — SODIUM CHLORIDE 1000 ML: 9 INJECTION, SOLUTION INTRAVENOUS at 02:31

## 2022-07-03 RX ADMIN — CEFEPIME HYDROCHLORIDE 2 G: 2 INJECTION, POWDER, FOR SOLUTION INTRAVENOUS at 18:58

## 2022-07-03 RX ADMIN — SODIUM CHLORIDE 1000 ML: 9 INJECTION, SOLUTION INTRAVENOUS at 00:40

## 2022-07-03 RX ADMIN — INSULIN ASPART 2 UNITS: 100 INJECTION, SOLUTION INTRAVENOUS; SUBCUTANEOUS at 08:56

## 2022-07-03 ASSESSMENT — ACTIVITIES OF DAILY LIVING (ADL)
ADLS_ACUITY_SCORE: 20
DRESSING/BATHING_DIFFICULTY: NO
ADLS_ACUITY_SCORE: 20
WALKING_OR_CLIMBING_STAIRS_DIFFICULTY: NO
ADLS_ACUITY_SCORE: 35
DIFFICULTY_EATING/SWALLOWING: NO
ADLS_ACUITY_SCORE: 20
ADLS_ACUITY_SCORE: 20
FALL_HISTORY_WITHIN_LAST_SIX_MONTHS: NO
ADLS_ACUITY_SCORE: 20
ADLS_ACUITY_SCORE: 20
WEAR_GLASSES_OR_BLIND: NO
ADLS_ACUITY_SCORE: 20
ADLS_ACUITY_SCORE: 20
CONCENTRATING,_REMEMBERING_OR_MAKING_DECISIONS_DIFFICULTY: NO
DOING_ERRANDS_INDEPENDENTLY_DIFFICULTY: NO
CHANGE_IN_FUNCTIONAL_STATUS_SINCE_ONSET_OF_CURRENT_ILLNESS/INJURY: NO
ADLS_ACUITY_SCORE: 20
ADLS_ACUITY_SCORE: 20
TOILETING_ISSUES: NO

## 2022-07-03 NOTE — ED NOTES
Suggested to patient to take off extra layers of blankets to help lower fever  MD notified   Will obtain orders for antipyretic

## 2022-07-03 NOTE — DISCHARGE INSTRUCTIONS
We are treating your for a kidney infection  Please take the antibiotics as prescribed  Important to follow up as soon as possible with your primary care doctor (will need to reassess potassium, blood sugar, kidney function).   Return with fevers, abdominal pain, unable to keep down foods/fluids or any other worsening symptoms    DIABETES REMINDERS:  1) Check your blood sugar 2-4x times per day (fasting, 2 hours after eating, before bedtime).  Always bring your blood sugar log and meter to your diabetes-related appointments.  2) Your blood sugar goals:  mg/dL before eating and  mg/dL 2 hours after eating (or per your doctor).  3) Always be prepared to treat a low blood sugar should it happen. Keep a sugar-containing beverage or food nearby.  4) When to call your clinic:   Blood sugar over 400 mg/dL.   If you have 2 to 3 low blood sugars (under 70 mg/dL) in a row,   Low reading the same time of day several days in a row,  Blood sugars elevated and you can not get them down with your usual diabetes regimen,  You are ill and can't keep blood sugars controlled.   5) When to call 911: If your blood glucose does not get better with treatment, or if you/someone else is unable to give you treatment.  6) Talk to your primary care doctor about an appointment with a Certified Diabetes Educator to assist you with your blood sugar management.

## 2022-07-03 NOTE — ED PROVIDER NOTES
NAME: Vijay Her  AGE: 31 year old female  YOB: 1990  MRN: [id number]  EVALUATION DATE & TIME: 2022  7:42 PM    PCP: Maggy Guerrero    ED PROVIDER: Arabella De Leon MD.      Chief Complaint   Patient presents with     Dizziness     Fatigue         FINAL IMPRESSION:  1. Pyelonephritis    2. Lightheadedness        MEDICAL DECISION MAKIN:10 PM I met with the patient, obtained history, performed an initial exam, and discussed options and plan for diagnostics and treatment here in the ED.     Pertinent Labs & Imaging studies reviewed. (See chart for details)       MDM: 30 y/o F who presents with fatigue, chills, lightheadedness and dysuria. On arrival she was tachycardic with temp of 99.7, nontoxic appearing. Broad workup started. Covid/influenza swab negative. UA is infectious appearing and CT abd/pelvis shows findings of pyelonephritis. Initial lactic acid 2.5 and improved to 1.2 with fluids. Has mild CONSTANCE, mild thrombocytopenia and hyperglycemia (no diagnosis of diabetes, labs not suggestive of DKA). Also hypokalemia and replacement given. Mentioned some vaginal discharge, wet prep negative, declined STD testing and with no lower abdominal pain do not suspect PID/TOA. She was given IV ceftriaxone for pyelonephritis. We considered discharge home but she remained tachycardic and then febrile to 103 with ongoing lightheadedness. Given this recommended admission and she and hospitalist are in agreement with plan.      MEDICATIONS GIVEN IN THE EMERGENCY:  Medications   lidocaine 1 % 0.1-1 mL (has no administration in time range)   lidocaine (LMX4) cream (has no administration in time range)   sodium chloride (PF) 0.9% PF flush 3 mL (has no administration in time range)   sodium chloride (PF) 0.9% PF flush 3 mL (has no administration in time range)   melatonin tablet 1 mg (has no administration in time range)   0.9% sodium chloride + KCl 20 mEq/L infusion (has no administration in time range)    acetaminophen (TYLENOL) tablet 650 mg (has no administration in time range)     Or   acetaminophen (TYLENOL) Suppository 650 mg (has no administration in time range)   enoxaparin ANTICOAGULANT (LOVENOX) injection 40 mg (has no administration in time range)   glucose gel 15-30 g (has no administration in time range)     Or   dextrose 50 % injection 25-50 mL (has no administration in time range)     Or   glucagon injection 1 mg (has no administration in time range)   insulin aspart (NovoLOG) injection (RAPID ACTING) (has no administration in time range)   insulin aspart (NovoLOG) injection (RAPID ACTING) (has no administration in time range)   0.9% sodium chloride BOLUS (has no administration in time range)   acetaminophen (TYLENOL) tablet 650 mg (650 mg Oral Given 7/2/22 2033)   0.9% sodium chloride BOLUS (0 mLs Intravenous Stopped 7/3/22 0030)   potassium chloride ER (KLOR-CON M) CR tablet 40 mEq (40 mEq Oral Given 7/2/22 2223)   cefTRIAXone (ROCEPHIN) 2 g vial to attach to  ml bag for ADULTS or NS 50 ml bag for PEDS (0 g Intravenous Stopped 7/3/22 0025)   0.9% sodium chloride BOLUS (0 mLs Intravenous Stopped 7/3/22 0115)   iopamidol (ISOVUE-370) solution 100 mL (100 mLs Intravenous Given 7/2/22 2257)   acetaminophen (TYLENOL) tablet 650 mg (650 mg Oral Given 7/3/22 0130)       NEW PRESCRIPTIONS STARTED AT TODAY'S ER VISIT:  New Prescriptions    CEFDINIR (OMNICEF) 300 MG CAPSULE    Take 1 capsule (300 mg) by mouth 2 times daily for 14 days          =================================================================    HPI    Patient information was obtained from: patient    Use of : N/A     Vijay  is a 31 year old female with a past medical history of acute cholecystitis and morbid obesity who presents to the ED for evaluation of fever, chills, light-headedness, urinary frequency, and vaginal discharge.    Patient reports having 2 days worth of subjective fever and chills. She endorses vomiting 2  "days ago as well as yesterday, but notes that she has not had any episodes of vomiting since last night. She additionally endorses light-headedness when standing. She denies any recent cough, sore throat, rhinorrhea, chest pain, shortness of breath, diarrhea, or abdominal pain.    Patient also reports having recent urinary frequency and abnormal vaginal discharge, but denies any dysuria or hematuria. She denies any known recent sick contacts. She denies any recent travel. She denies any known pertinent medical history and notes she does not take any prescribed medications. She denies any rashes or any other complications at this time.      REVIEW OF SYSTEMS   Review of Systems   Constitutional: Positive for chills (subjective) and fever (subjective).   HENT: Negative for rhinorrhea and sore throat.    Respiratory: Negative for cough and shortness of breath.    Cardiovascular: Negative for chest pain.   Gastrointestinal: Negative for abdominal pain and diarrhea.   Genitourinary: Positive for frequency and vaginal discharge. Negative for dysuria and hematuria.   Skin: Negative for rash.   Neurological: Positive for light-headedness.   All other systems reviewed and are negative.       PAST MEDICAL HISTORY:  Past Medical History:   Diagnosis Date     Acute cholecystitis 10/02/2020     Gestational diabetes mellitus (GDM) 10/12/2016    insulin dependant for over 4 mos     History of gestational diabetes in prior pregnancy, currently pregnant in second trimester 2021     Menarche 16 y/o    Menarche 16 y/o, menses qmo, bleeding x7 days (heavier since birth of 2nd child)      (normal spontaneous vaginal delivery) 10/30/2015    - 10/31/15:  40w6d [facility]/Loredo, labor 2h57m (push 8m, CHRIS), no pain meds, no lac, EBL 175mL (recd pitocin 10mU IM). GBS neg. Female BW 7#14.3 oz, L 20\", HC 13.5\", apgars 9/9. formula-feeding          (normal spontaneous vaginal delivery) 2016     STAR (obstructive sleep " apnea)     does not wear cpap, tried using it but was not using it enough-reports no cpap at home 3/2/22     Postpartum depression     possible postpartum depression, mild     Supervision of high risk pregnancy in second trimester 3/19/2015     per Dr Loredo: FINAL EDC 10/25/15 by 19-5 u/s at [facility] on 6/5/15 c/w  UNsure LMP mid 2015 Obesity, BMI >40   7/8/15 24-3: 1 hr , HgbA1C 5.1 Replacement Utility updated for latest IMO load      UTI in pregnancy 2014    - 14 prenatal 36+wk GA: UCx >100k eColi pan-sensitive - 6/10/15 prenatal 20-3 (walk in clinic): UCx >100k eColi (R amp/tetracycline/TMP-SMX), treated with nitrofurantoin - 2015 prenatal 24w3d: sxs resolved, Ucx NEG  - 2015 MyChart message: c/o strong odor back again, advised NTBS and check urine culture and if again has UTI then will need to keep her on prophylactic antibiotics for dur       PAST SURGICAL HISTORY:  Past Surgical History:   Procedure Laterality Date     LAPAROSCOPIC CHOLECYSTECTOMY N/A 10/2/2020    Procedure: CHOLECYSTECTOMY, LAPAROSCOPIC;  Surgeon: Malvin hCu MD;  Location: [facility];  Service: General     NO PAST SURGERIES         CURRENT MEDICATIONS:      Current Facility-Administered Medications:      0.9% sodium chloride + KCl 20 mEq/L infusion, , Intravenous, Continuous, Irish Barnes MD     0.9% sodium chloride BOLUS, 500 mL, Intravenous, Once, Irish Barnes MD     acetaminophen (TYLENOL) tablet 650 mg, 650 mg, Oral, Q6H PRN **OR** acetaminophen (TYLENOL) Suppository 650 mg, 650 mg, Rectal, Q6H PRN, Irish Barnes MD     glucose gel 15-30 g, 15-30 g, Oral, Q15 Min PRN **OR** dextrose 50 % injection 25-50 mL, 25-50 mL, Intravenous, Q15 Min PRN **OR** glucagon injection 1 mg, 1 mg, Subcutaneous, Q15 Min PRN, Irish Barnes MD     enoxaparin ANTICOAGULANT (LOVENOX) injection 40 mg, 40 mg, Subcutaneous, Q12H, Irish Barnes MD     insulin aspart (NovoLOG) injection  (RAPID ACTING), 1-7 Units, Subcutaneous, TID AC, Irish Barnes MD     insulin aspart (NovoLOG) injection (RAPID ACTING), 1-5 Units, Subcutaneous, At Bedtime, Irish Barnes MD     lidocaine (LMX4) cream, , Topical, Q1H PRN, Irish Barnes MD     lidocaine 1 % 0.1-1 mL, 0.1-1 mL, Other, Q1H PRN, Irish Barnes MD     melatonin tablet 1 mg, 1 mg, Oral, At Bedtime PRN, Irish Barnes MD     sodium chloride (PF) 0.9% PF flush 3 mL, 3 mL, Intracatheter, Q8H, Irish Barnes MD     sodium chloride (PF) 0.9% PF flush 3 mL, 3 mL, Intracatheter, q1 min prn, Irish Barnes MD    Current Outpatient Medications:      cefdinir (OMNICEF) 300 MG capsule, Take 1 capsule (300 mg) by mouth 2 times daily for 14 days, Disp: 28 capsule, Rfl: 0     ACCU-CHEK GUIDE test strip, USE TO TEST BLOOD SUGAR 4 TIMES DAILY OR AS DIRECTED./ TXHUA HNUB NTSUAS NTSHAV QAB ZIB 4 ZAUG LI TUS SHIELA MOB QHIA, Disp: , Rfl:      Acetone, Urine, Test (KETONE TEST) STRP, USE TO TEST URINE ONCE DAILY IN THE MORNING BEFORE EATING OR DRINKING. *50 DAYS*, Disp: , Rfl:      Alcohol Swabs (ALCOHOL PREP) 70 % PADS, USE 1 EACH 4 TIMES DAILY/ TXHUA HNUB SIV 4 ZAUG, Disp: , Rfl:      blood glucose monitoring (SOFTCLIX) lancets, USE TO TEST BLOOD SUGAR 4 TIMES DAILY OR AS DIRECTED./ TXHUA HNUB NTSUAS NTSHAV QAB ZIB 4 ZAUG LI TUS SHIELA MOB QHIA, Disp: , Rfl:      Blood Glucose Monitoring Suppl (ACCU-CHEK GUIDE ME) w/Device KIT, USE 1 EACH ONCE FOR 1 DOSE, Disp: , Rfl:      docusate sodium (COLACE) 100 MG capsule, Take 1 capsule (100 mg) by mouth 2 times daily as needed for constipation, Disp: 60 capsule, Rfl: 0     etonogestrel (NEXPLANON) 68 MG IMPL, 1 each (68 mg) by Subdermal route continuous, Disp: , Rfl:      ibuprofen (ADVIL/MOTRIN) 800 MG tablet, Take 1 tablet (800 mg) by mouth every 8 hours as needed for other (cramping), Disp: 30 tablet, Rfl: 0     Insulin Pen Needle (PEN NEEDLES) 32G X 4 MM MISC, 1 each 4 times daily, Disp: 200 each,  Rfl: 1     methocarbamol (ROBAXIN) 500 MG tablet, Take 1 tablet (500 mg) by mouth 3 times daily as needed for muscle spasms, Disp: 20 tablet, Rfl: 0     Prenatal Vit-Fe Fumarate-FA (PRENATAL MULTIVITAMIN W/IRON) 27-0.8 MG tablet, , Disp: , Rfl:      triamcinolone (KENALOG) 0.1 % external cream, Apply topically 2 times daily Affected areas on abdomen, Disp: 45 g, Rfl: 0    ALLERGIES:  No Known Allergies    FAMILY HISTORY:  Family History   Problem Relation Age of Onset     Snoring Brother      No Known Problems Son          11     No Known Problems Daughter          14     Cerebrovascular Disease Paternal Grandfather 50.00        estimate 50s y/o     Hypertension Paternal Grandfather      Diabetes Type 2  Paternal Grandmother         Pat Gma DM2, CAD     Heart Disease Paternal Grandmother         Pat Gma DM2, CAD     Diabetes Type 2  Paternal Uncle         insulin dependent DM2, obese     Gestational Diabetes Cousin         GDM insulin dependent with 2nd pregnancy, obese     Diabetes Type 2  Paternal Aunt      No Known Problems Mother      No Known Problems Father      No Known Problems Sister      No Known Problems Brother      No Known Problems Sister      No Known Problems Brother      No Known Problems Brother      Diabetes Maternal Grandmother      Cancer No family hx of        SOCIAL HISTORY:   Social History     Socioeconomic History     Marital status: Single     Number of children: 3     Years of education: [id number]   Tobacco Use     Smoking status: Never Smoker     Smokeless tobacco: Never Used   Substance and Sexual Activity     Alcohol use: No     Comment: Alcoholic Drinks/day: none     Drug use: No     Sexual activity: Yes     Partners: Male     Birth control/protection: None   Other Topics Concern     Parent/sibling w/ CABG, MI or angioplasty before 65F 55M? 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 [place] then moved to [place] [facility] . Family/parents originally from [place].    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 Comfortable appearing.  HENT: Normocephalic, atraumatic, mucous membranes moist, nose normal. Neck- Supple, gross ROM intact.   Eyes: Pupils mid-range, sclera white, no discharge  Respiratory: Clear to auscultation bilaterally, no respiratory distress, no wheezing, speaks full sentences easily.  Cardiovascular: Tachycardic, regular rhythm. No lower extremity edema   GI: Soft, no tenderness to deep palpation in all quadrants, no masses.  Musculoskeletal: Moving all 4 extremities intentionally and without pain. No obvious deformity.  Skin: Warm, dry, no rash.  Neurologic: Alert & oriented x 3, speech clear, moving all extremities spontaneously   Psychiatric: Affect normal, cooperative.     LAB:  All pertinent labs reviewed and interpreted.  Labs Ordered and Resulted from Time of ED Arrival to Time of ED Departure   CBC WITH PLATELETS - Abnormal       Result Value    WBC Count 6.9      RBC Count 4.93      Hemoglobin 12.7      Hematocrit 38.9      MCV 79      MCH 25.8 (*)     MCHC 32.6      RDW 13.1      Platelet " Count 140 (*)    COMPREHENSIVE METABOLIC PANEL - Abnormal    Sodium 136      Potassium 3.1 (*)     Chloride 102      Carbon Dioxide (CO2) 24      Anion Gap 10      Urea Nitrogen 10      Creatinine 1.23 (*)     Calcium 9.0      Glucose 306 (*)     Alkaline Phosphatase 43 (*)     AST 28      ALT 29      Protein Total 7.6      Albumin 3.5      Bilirubin Total 2.6 (*)     GFR Estimate 60 (*)    ROUTINE UA WITH MICROSCOPIC REFLEX TO CULTURE - Abnormal    Color Urine Yellow      Appearance Urine Turbid (*)     Glucose Urine >1000 (*)     Bilirubin Urine Negative      Ketones Urine Trace (*)     Specific Gravity Urine 1.031 (*)     Blood Urine 1.0 mg/dL (*)     pH Urine 6.0      Protein Albumin Urine 300  (*)     Urobilinogen Urine <2.0      Nitrite Urine Positive (*)     Leukocyte Esterase Urine 500 Love/uL (*)     Bacteria Urine Few (*)     Mucus Urine Present (*)     RBC Urine 12 (*)     WBC Urine >182 (*)     Squamous Epithelials Urine 3 (*)    LACTIC ACID WHOLE BLOOD - Abnormal    Lactic Acid 2.5 (*)    INFLUENZA A/B & SARS-COV2 PCR MULTIPLEX - Normal    Influenza A PCR Negative      Influenza B PCR Negative      RSV PCR Negative      SARS CoV2 PCR Negative     HCG QUALITATIVE URINE - Normal    hCG Urine Qualitative Negative     LACTIC ACID WHOLE BLOOD - Normal    Lactic Acid 1.2     CREATININE - Normal    Creatinine 0.91      GFR Estimate 87     WET PREPARATION - Normal    Trichomonas Absent      Yeast Absent      Clue Cells Absent      WBCs/high power field None     GLUCOSE MONITOR NURSING POCT   GLUCOSE MONITOR NURSING POCT   GLUCOSE MONITOR NURSING POCT   GLUCOSE MONITOR NURSING POCT   COMPREHENSIVE METABOLIC PANEL   MAGNESIUM   URINE CULTURE   BLOOD CULTURE   BLOOD CULTURE       RADIOLOGY:  CT Abdomen Pelvis w Contrast   Final Result   IMPRESSION:    1.  Right-sided renal pyelonephritis. No abscess.      2.  Normal appendix.      3.  Hepatosplenomegaly. Fatty liver.      4.  Cholecystectomy.          EKG:    Performed at: 02-JUL-2022, 20:24  Impression: Sinus tachycardia. Left anterior fascicular block. Abnormal ECG.  Rate: 111 BPM  Rhythm: Sinus tachycardia.   MN Interval: 160 ms  QRS Interval: 96 ms  QT/QTc Interval: 338/459 ms  Comparison: No previous ECGs available for comparison.   I have independently reviewed and interpreted the EKG(s) documented above.         I, Lemuel Skelton, am serving as a scribe to document services personally performed by Dr. Arabella De Leon based on my observation and the provider's statements to me. I, Arabella De Leon MD attest that Lemuel Skelton is acting in a scribe capacity, has observed my performance of the services and has documented them in accordance with my direction.      Arabella De Leon M.D.  Emergency Medicine  [facility] EMERGENCY ROOM  [address] [facility] [address] [address]  [phone]  Dept: [phone]       Arabella De Leon MD  07/03/22 0224

## 2022-07-03 NOTE — PHARMACY-ADMISSION MEDICATION HISTORY
Pharmacy Note - Admission Medication History    Pertinent Provider Information: patient confirms she is not taking any medication.     ______________________________________________________________________    Prior To Admission (PTA) med list completed and updated in EMR.       PTA Med List   Medication Sig Last Dose           etonogestrel (NEXPLANON) 68 MG IMPL 1 each (68 mg) by Subdermal route continuous        Information source(s): [facility]  Method of interview communication: in-person    Summary of Changes to PTA Med List  New: n/a  Discontinued: colace, ibuprofen, methocarbamol, prenatal, kenalog  Changed: n/a    Patient was asked about OTC/herbal products specifically.  PTA med list reflects this.    In the past week, patient estimated taking medication this percent of the time:  greater than 90%.    Allergies were reviewed, assessed, and updated with the patient.      Patient does not use any multi-dose medications prior to admission.    The information provided in this note is only as accurate as the sources available at the time of the update(s).    Thank you for the opportunity to participate in the care of this patient.    Jazmyn Talavera  7/3/2022 8:29 AM

## 2022-07-03 NOTE — PLAN OF CARE
Goal Outcome Evaluation:    Plan of Care Reviewed With: patient     Overall Patient Progress: improving    Outcome Evaluation: Pt is still febrile with the last temp of 102.8. Denies chills with fever. Tylenol 975mg given at 0545. Mag and K+ have been replaced. Tele ST. Able to ambulate to the bathroom. Nacl K+ 20 running at 150cc/hr.

## 2022-07-03 NOTE — PROVIDER NOTIFICATION
Rm 342. both BC culture is positive for gram- bacilli. Also, can pt be taken off tele for shower? Please advise.     Orders placed.     Provider contacted again at 1400 with positive BC result of E. Coli. And only 1 BC collected.     TVO okay to stay current course of antibiotics, and okay to have 1 Peripheral BC collection. Discontinue other BC collection.

## 2022-07-03 NOTE — PROGRESS NOTES
[facility] Daily Progress Note    Assessment/Plan:  31-year-old female with history of diabetes mellitus type 2, morbid obesity, recurrent urinary tract infections presenting with febrile illness diagnosed with right-sided pyelonephritis and sepsis secondary to SIRS criteria.  Patient is clinically improving on IV antibiotics and fluid protocol.    Right-sided pyelonephritis  Sepsis based on SIRS criteria, normotensive.   Sinus tachycardia  Fever   Lactic acid elevation decreased from 2.5-1.2.  Procalcitonin 2.6.  UA abnormal consistent with acute urinary tract infection.    CT on pelvis with findings of right-sided pyelonephritis.  Blood culture pending no growth to date  Urine culture pending  Continue cefepime 2 g IV every 8 hours  Continue 1/2 normal saline 100 cc/h.    Suspected metabolic acidosis  Likely secondary to normal saline infusion, hyperchloremic acidosis.  Change IV fluids to half-normal saline 100 cc/h  Recheck basic metabolic profile in a.m.    Microcytic anemia  Mild thrombocytopenia  Hemoglobin 10.4 g/dL decrease likely secondary to dilutional effect from IV fluids.  Platelet count 108 down from 140 again likely secondary to dilutional effect.  Order iron studies vitamin B12 level.    Hypokalemia  Hypomagnesemia  Magnesium and potassium replacement protocol.    Diabetes mellitus type 2 in obese  Diet controlled at baseline.   Order POCT glucose QID AC HS  Order insulin novolog medium correction scale QID AC HS    Morbid obesity  Obstructive sleep apnea  Continue home CPAP    Elevated total bilirubin level  Trending down to 1.6 likely stress response.    Diet: Combination Diet Regular Diet Adult  DVT Prophylaxis:  Enoxaparin (Lovenox) SQ  Code Status: Full Code    Active Problems:    Lightheadedness    Pyelonephritis     LOS: 0 days     Barriers to discharge: Acute pyelonephritis  Discharge Disposition: Home    Subjective:  Vijay reports feeling a little bit better today.  She had some fever and chills  overnight.  Denies any flank tenderness, nausea or vomiting.      ceFEPIme (MAXIPIME) IV  2 g Intravenous Q8H     enoxaparin ANTICOAGULANT  40 mg Subcutaneous Q12H     insulin aspart  1-7 Units Subcutaneous TID AC     insulin aspart  1-5 Units Subcutaneous At Bedtime     sodium chloride (PF)  3 mL Intracatheter Q8H     sodium chloride (PF)  3 mL Intracatheter Q8H       Objective:  Vital signs in last 24 hours:  Temp:  [99.7  F (37.6  C)-103.2  F (39.6  C)] 102.8  F (39.3  C)  Pulse:  [108-128] 108  Resp:  [16-29] 18  BP: (117-133)/(67-78) 121/67  FiO2 (%):  [21 %] 21 %  SpO2:  [97 %-100 %] 100 %  Weight:   Weight:   @THISENCWEIGHTS(1)@  Weight change:   Body mass index is 44.71 kg/m .    Intake/Output last 3 shifts:  I/O last 3 completed shifts:  In: 1480 [P.O.:480; IV Piggyback:1000]  Out: -   Intake/Output this shift:  No intake/output data recorded.    Review of Systems:   As per subjective, all others negative.    Physical Exam:    GENERAL:  Alert, appears comfortable, in no acute distress, appears stated age   HEAD:  Normocephalic, without obvious abnormality, atraumatic   NECK: Supple, symmetrical, trachea midline   BACK:   Symmetric, no curvature, ROM normal   LUNGS:   Clear to auscultation bilaterally, no rales, rhonchi, or wheezing, symmetric chest rise on inhalation, respirations unlabored   CHEST WALL:  No tenderness or deformity   HEART:  Regular rate and rhythm, S1 and S2 normal, no murmur, rub, or gallop    ABDOMEN:   Soft, non-tender, bowel sounds active all four quadrants, no masses, no organomegaly, no rebound or guarding   EXTREMITIES: Extremities normal, atraumatic, no cyanosis or edema    SKIN: Dry to touch, no exanthems in the visualized areas   NEURO: Alert, oriented x3, moves all four extremities freely, non-focal   PSYCH: Cooperative, behavior is appropriate      Imaging:  Personally Reviewed.  Results for orders placed or performed during the hospital encounter of 07/02/22   CT Abdomen  Pelvis w Contrast    Impression    IMPRESSION:   1.  Right-sided renal pyelonephritis. No abscess.  2.  Normal appendix.  3.  Hepatosplenomegaly. Fatty liver.  4.  Cholecystectomy.       Lab Results:  Personally Reviewed.   Recent Labs   Lab 07/03/22 0312 07/02/22 2038   WBC 5.8 6.9   HGB 10.4* 12.7   HCT 31.2* 38.9   * 140*     Recent Labs   Lab 07/03/22 0312 07/03/22 0118 07/02/22 2038    136 136   CO2 18* 20* 24   BUN 8 9 10   ALBUMIN 2.9* 3.0* 3.5   ALKPHOS 37* 41* 43*   ALT 24 26 29   AST 24 26 28     No results for input(s): INR in the last 168 hours.    I reviewed all labs and imaging studies as of this date and I reviewed all current inpatient medications and updated them    Javier Thacker DO, MS  [facility]  Internal Medicine

## 2022-07-03 NOTE — PLAN OF CARE
Goal Outcome Evaluation:    Plan of Care Reviewed With: patient     Overall Patient Progress: improving    Outcome Evaluation: Temp has resolved to 98.7 with the prn tylenol early this morning. IVF changed to 1/2 Nacl at 100cc/hr. HR improving. K+ recheck at 1100 and mag at 1000. Blood and urine cultures pending. Pt denies pain or discomfort.

## 2022-07-03 NOTE — H&P
[facility] MEDICINE ADMISSION HISTORY AND PHYSICAL       Assessment & Plan      1. Acute pyelonephritis, concerning for sepsis - lactic acidosis, fevers and tachycardia. She had prior UTIs, and grew E coli     - IVF, pain meds, anti emetics  - Was given rocephin in ED, if not better broaden   - AM labs, lactic acid and procalcitonn   - had 2L from ED, will add 1L of NSS now and re-eval after, NSS at 125cc with K after     2. Hypokalemia with mild renal failure likely ATN   - IVF, replace electrolytes    3. Hyperglycemia with glucose of 300, with glucosuria, she has history of gestational diabetes - likely she has now T2DM  - aic check if up --  for DM    4. BMI of 45, STAR on CPAP    250A -- She continue to spike temperature at 103. And HR at 120s.  She is bacteremic, and given concerns that initial antibiotic may not have pseudomonal coverage, we may have to change it, specially she has pseudomonal risk factor such as diabetes and have had prior UTIs - Will use cefepime      VTE prophylaxis: SCDs,  lovenox  Diet:  Regular   Code Status: Full  COVID test result:  negative   COVID vaccination: completed   Barriers to discharge: admitting clinical condition  Discharge Disposition and goals:  Unable to determine at this point, pending clinical progress and response to treatment. Patient may need transfer to SNF or ACR if unsafe to go home and needed treatment inappropriate at home setting OR may need home health care evaluation if care can be delivered at home settings. Consider referral to care manager/    PPE - I was wearing PPE when I met the patient - N95 mask, Surgical mask, Isolation gown, Gloves, Safety glasses.      Care plan was created based on available information provided, including patient's condition at the time of encounter.   This plan was discussed with patient and/or family members using layman's terms and have agreed to proceed.   At the end of night  shift (9PM - 730A), this case will be presented to the AM Hospitalist.    It is recommended to revise care plan and review history if there is change in condition and/or new clinical information is not available during my encounter.     All or some of home medication/s were not resumed on admission due to safety reasons or contraindications. Dosing and frequency may also have been modified. Please resume/review them during your visit.     70 minutes of total visit duration and greater than 50% was spent in direct evaluation of patient and coordination of care including discussion of diagnostic test results and recommended treatment. .      Hayden Barnes MD, MPH, FACP, [facility]  Internal Medicine - Hospitalist        Chief Complaint Fevers      HISTORY     - She came in with symptoms of fevers, vomiting, and urinary symptoms -     - Met her in the ED, she was sleeping. Feels a little better. She was having rigors, chills for 3 days. She did vomit. No flank or abdominal pain. She has urinary frequency. She denies CP or SOB. She had vaginal discharge recently, but had stop in the last 3 days. No recurrence she said.     - In the ED, she was found to have UTI. She was tachycardic and has fever of 103. She has lactic acid and was given 2Ls only. She was given Rocephin 2 gms after blood culture.    - She has a normal WBC. She has mild renal failure. CT scan showed right sided pyelonephritis.      - ROS --- No headache. No dizziness. No CP or SOB. No palpitations. No abdominal pain.  No bleeding symptoms. No weight loss. Rest of 12 point ROS was reviewed and negative.       Past Medical History     Past Medical History:   Diagnosis Date     Acute cholecystitis 10/02/2020     Gestational diabetes mellitus (GDM) 10/12/2016    insulin dependant for over 4 mos     History of gestational diabetes in prior pregnancy, currently pregnant in second trimester 11/23/2021     Menarche 18 y/o    Menarche 18 y/o, menses qmo, bleeding x7  "days (heavier since birth of 2nd child)      (normal spontaneous vaginal delivery) 10/30/2015    - 10/31/15:  40w6d [facility], labor 2h57m (push 8m, CHRIS), no pain meds, no lac, EBL 175mL (recd pitocin 10mU IM). GBS neg. Female BW 7#14.3 oz, L 20\", HC 13.5\", apgars 9/9. formula-feeding          (normal spontaneous vaginal delivery) 2016     STAR (obstructive sleep apnea)     does not wear cpap, tried using it but was not using it enough-reports no cpap at home 3/2/22     Postpartum depression     possible postpartum depression, mild     Supervision of high risk pregnancy in second trimester 3/19/2015     per Dr Loredo: FINAL EDC 10/25/15 by 19-5 u/s at [facility] on 6/5/15 c/w  UNsure LMP mid 2015 Obesity, BMI >40   7/8/15 24-3: 1 hr , HgbA1C 5.1 Replacement Utility updated for latest IMO load      UTI in pregnancy 2014    - 14 prenatal 36+wk GA: UCx >100k eColi pan-sensitive - 6/10/15 prenatal 20-3 (walk in clinic): UCx >100k eColi (R amp/tetracycline/TMP-SMX), treated with nitrofurantoin - 2015 prenatal 24w3d: sxs resolved, Ucx NEG  - 2015 MyChart message: c/o strong odor back again, advised NTBS and check urine culture and if again has UTI then will need to keep her on prophylactic antibiotics for dur         Surgical History     Past Surgical History:   Procedure Laterality Date     LAPAROSCOPIC CHOLECYSTECTOMY N/A 10/2/2020    Procedure: CHOLECYSTECTOMY, LAPAROSCOPIC;  Surgeon: Malvin Chu MD;  Location: [facility] OR;  Service: General     NO PAST SURGERIES          Family History      Family History   Problem Relation Age of Onset     Snoring Brother      No Known Problems Son          11     No Known Problems Daughter          14     Cerebrovascular Disease Paternal Grandfather 50.00        estimate 50s y/o     Hypertension Paternal Grandfather      Diabetes Type 2  Paternal Grandmother         Pat Gma DM2, CAD     Heart " "Disease Paternal Grandmother         Pat Gma DM2, CAD     Diabetes Type 2  Paternal Uncle         insulin dependent DM2, obese     Gestational Diabetes Cousin         GDM insulin dependent with 2nd pregnancy, obese     Diabetes Type 2  Paternal Aunt      No Known Problems Mother      No Known Problems Father      No Known Problems Sister      No Known Problems Brother      No Known Problems Sister      No Known Problems Brother      No Known Problems Brother      Diabetes Maternal Grandmother      Cancer No family hx of          Social History      .  Social History     Socioeconomic History     Marital status: Single     Spouse name: Not on file     Number of children: 3     Years of education: [email or web address]     Highest education level: Not on file   Occupational History     Not on file   Tobacco Use     Smoking status: Never Smoker     Smokeless tobacco: Never Used   Substance and Sexual Activity     Alcohol use: No     Comment: Alcoholic Drinks/day: none     Drug use: No     Sexual activity: Yes     Partners: Male     Birth control/protection: None   Other Topics Concern     Parent/sibling w/ CABG, MI or angioplasty before 65F 55M? 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 Family came to [place] , then moved to [place] . Family/parents originally from [place].    OCCUPATIONAL HISTORY:  - 2015: \"Works at [employer] as a CNA since 3/2015\"  - PAST: CNA @[employer] " [employer] home, homemaker        Social Determinants of Health     Financial Resource Strain: Not on file   Food Insecurity: Not on file   Transportation Needs: Not on file   Physical Activity: Not on file   Stress: Not on file   Social Connections: Not on file   Intimate Partner Violence: Not on file   Housing Stability: Not on file          Allergies      No Known Allergies      Prior to Admission Medications      No current facility-administered medications on file prior to encounter.  ACCU-CHEK GUIDE test strip, USE TO TEST BLOOD SUGAR 4 TIMES DAILY OR AS DIRECTED./ TXHUA HNUB NTSUAS NTSHAV QAB ZIB 4 ZAUG LI TUS SHIELA MOB QHIA  Acetone, Urine, Test (KETONE TEST) STRP, USE TO TEST URINE ONCE DAILY IN THE MORNING BEFORE EATING OR DRINKING. *50 DAYS*  Alcohol Swabs (ALCOHOL PREP) 70 % PADS, USE 1 EACH 4 TIMES DAILY/ TXHUA HNUB SIV 4 ZAUG  blood glucose monitoring (SOFTCLIX) lancets, USE TO TEST BLOOD SUGAR 4 TIMES DAILY OR AS DIRECTED./ TXHUA HNUB NTSUAS NTSHAV QAB ZIB 4 ZAUG LI TUS SHIELA MOB QHIA  Blood Glucose Monitoring Suppl (ACCU-CHEK GUIDE ME) w/Device KIT, USE 1 EACH ONCE FOR 1 DOSE  docusate sodium (COLACE) 100 MG capsule, Take 1 capsule (100 mg) by mouth 2 times daily as needed for constipation  etonogestrel (NEXPLANON) 68 MG IMPL, 1 each (68 mg) by Subdermal route continuous  ibuprofen (ADVIL/MOTRIN) 800 MG tablet, Take 1 tablet (800 mg) by mouth every 8 hours as needed for other (cramping)  Insulin Pen Needle (PEN NEEDLES) 32G X 4 MM MISC, 1 each 4 times daily  methocarbamol (ROBAXIN) 500 MG tablet, Take 1 tablet (500 mg) by mouth 3 times daily as needed for muscle spasms  Prenatal Vit-Fe Fumarate-FA (PRENATAL MULTIVITAMIN W/IRON) 27-0.8 MG tablet,   triamcinolone (KENALOG) 0.1 % external cream, Apply topically 2 times daily Affected areas on abdomen            Review of Systems     A 12 point comprehensive review of systems was negative except as noted above in HPI.    PHYSICAL EXAMINATION       Vitals   "    Vitals: /68   Pulse 120   Temp (!) 103  F (39.4  C) (Oral)   Resp 18   Ht 1.676 m (5' 6\")   Wt 127 kg (280 lb)   SpO2 99%   BMI 45.19 kg/m    BMI= Body mass index is 45.19 kg/m .      Examination     General Appearance:  Alert, cooperative, no distress  Head:    Normocephalic, without obvious abnormality, atraumatic  EENT:  PERRL, conjunctiva/corneas clear, EOM's intact.   Neck:   Supple, symmetrical, trachea midline, no adenopathy; no NVE  Back:  Symmetric, no curvature, no CVA tenderness  Chest/Lungs: Clear to auscultation bilaterally, respirations unlabored, No tenderness or deformity. No abdominal breathing or use of accessory muscles.   Heart:    Regular rate and rhythm, S1 and S2 normal, no murmur, rub   or gallop  Abdomen: Soft, non-tender, bowel sounds active all four quadrants, not peritoneal on palpation. Not distended  Extremities:  Extremities normal, atraumatic, no swelling   Skin:  Skin color, texture, turgor normal, no rashes or lesion  Neurologic:  Awake and alert, No lateralizing or localizing signs               Pertinent Lab     Results for orders placed or performed during the hospital encounter of 07/02/22   CT Abdomen Pelvis w Contrast    Impression    IMPRESSION:   1.  Right-sided renal pyelonephritis. No abscess.    2.  Normal appendix.    3.  Hepatosplenomegaly. Fatty liver.    4.  Cholecystectomy.   Symptomatic; Yes; 6/29/2022 Influenza A/B & SARS-CoV2 (COVID-19) Virus PCR Multiplex Nasopharyngeal    Specimen: Nasopharyngeal; Swab   Result Value Ref Range    Influenza A PCR Negative Negative    Influenza B PCR Negative Negative    RSV PCR Negative Negative    SARS CoV2 PCR Negative Negative   CBC (+ platelets, no diff)   Result Value Ref Range    WBC Count 6.9 4.0 - 11.0 10e3/uL    RBC Count 4.93 3.80 - 5.20 10e6/uL    Hemoglobin 12.7 11.7 - 15.7 g/dL    Hematocrit 38.9 35.0 - 47.0 %    MCV 79 78 - 100 fL    MCH 25.8 (L) 26.5 - 33.0 pg    MCHC 32.6 31.5 - 36.5 g/dL    RDW " 13.1 10.0 - 15.0 %    Platelet Count 140 (L) 150 - 450 10e3/uL   Comprehensive metabolic panel   Result Value Ref Range    Sodium 136 136 - 145 mmol/L    Potassium 3.1 (L) 3.5 - 5.0 mmol/L    Chloride 102 98 - 107 mmol/L    Carbon Dioxide (CO2) 24 22 - 31 mmol/L    Anion Gap 10 5 - 18 mmol/L    Urea Nitrogen 10 8 - 22 mg/dL    Creatinine 1.23 (H) 0.60 - 1.10 mg/dL    Calcium 9.0 8.5 - 10.5 mg/dL    Glucose 306 (H) 70 - 125 mg/dL    Alkaline Phosphatase 43 (L) 45 - 120 U/L    AST 28 0 - 40 U/L    ALT 29 0 - 45 U/L    Protein Total 7.6 6.0 - 8.0 g/dL    Albumin 3.5 3.5 - 5.0 g/dL    Bilirubin Total 2.6 (H) 0.0 - 1.0 mg/dL    GFR Estimate 60 (L) >60 mL/min/1.73m2   UA with Microscopic reflex to Culture    Specimen: Urine, Clean Catch   Result Value Ref Range    Color Urine Yellow Colorless, Straw, Light Yellow, Yellow    Appearance Urine Turbid (A) Clear    Glucose Urine >1000 (A) Negative mg/dL    Bilirubin Urine Negative Negative    Ketones Urine Trace (A) Negative mg/dL    Specific Gravity Urine 1.031 (H) 1.001 - 1.030    Blood Urine 1.0 mg/dL (A) Negative    pH Urine 6.0 5.0 - 7.0    Protein Albumin Urine 300  (A) Negative mg/dL    Urobilinogen Urine <2.0 <2.0 mg/dL    Nitrite Urine Positive (A) Negative    Leukocyte Esterase Urine 500 Love/uL (A) Negative    Bacteria Urine Few (A) None Seen /HPF    Mucus Urine Present (A) None Seen /LPF    RBC Urine 12 (H) <=2 /HPF    WBC Urine >182 (H) <=5 /HPF    Squamous Epithelials Urine 3 (H) <=1 /HPF   HCG qualitative urine   Result Value Ref Range    hCG Urine Qualitative Negative Negative   Lactic acid whole blood   Result Value Ref Range    Lactic Acid 2.5 (H) 0.7 - 2.0 mmol/L   Lactic acid whole blood   Result Value Ref Range    Lactic Acid 1.2 0.7 - 2.0 mmol/L   Wet prep    Specimen: Vagina; Swab   Result Value Ref Range    Trichomonas Absent Absent    Yeast Absent Absent    Clue Cells Absent Absent    WBCs/high power field None None           Pertinent Radiology

## 2022-07-03 NOTE — ED NOTES
Pt ambulated to the bathroom independently with no difficulties   Denies feeling dizzy or lightheaded

## 2022-07-04 LAB
ANION GAP SERPL CALCULATED.3IONS-SCNC: 9 MMOL/L (ref 5–18)
BUN SERPL-MCNC: 6 MG/DL (ref 8–22)
CALCIUM SERPL-MCNC: 8.6 MG/DL (ref 8.5–10.5)
CHLORIDE BLD-SCNC: 112 MMOL/L (ref 98–107)
CO2 SERPL-SCNC: 16 MMOL/L (ref 22–31)
CREAT SERPL-MCNC: 0.75 MG/DL (ref 0.6–1.1)
GFR SERPL CREATININE-BSD FRML MDRD: >90 ML/MIN/1.73M2
GLUCOSE BLD-MCNC: 137 MG/DL (ref 70–125)
GLUCOSE BLDC GLUCOMTR-MCNC: 106 MG/DL (ref 70–99)
GLUCOSE BLDC GLUCOMTR-MCNC: 190 MG/DL (ref 70–99)
GLUCOSE BLDC GLUCOMTR-MCNC: 197 MG/DL (ref 70–99)
GLUCOSE BLDC GLUCOMTR-MCNC: 218 MG/DL (ref 70–99)
HOLD SPECIMEN: NORMAL
MAGNESIUM SERPL-MCNC: 1.7 MG/DL (ref 1.8–2.6)
POTASSIUM BLD-SCNC: 3.4 MMOL/L (ref 3.5–5)
POTASSIUM BLD-SCNC: 3.5 MMOL/L (ref 3.5–5)
POTASSIUM BLD-SCNC: 3.5 MMOL/L (ref 3.5–5)
SODIUM SERPL-SCNC: 137 MMOL/L (ref 136–145)

## 2022-07-04 PROCEDURE — 258N000002 HC RX IP 258 OP 250: Performed by: INTERNAL MEDICINE

## 2022-07-04 PROCEDURE — 250N000011 HC RX IP 250 OP 636: Performed by: INTERNAL MEDICINE

## 2022-07-04 PROCEDURE — 250N000013 HC RX MED GY IP 250 OP 250 PS 637: Performed by: INTERNAL MEDICINE

## 2022-07-04 PROCEDURE — 83735 ASSAY OF MAGNESIUM: CPT | Performed by: INTERNAL MEDICINE

## 2022-07-04 PROCEDURE — 99232 SBSQ HOSP IP/OBS MODERATE 35: CPT | Performed by: INTERNAL MEDICINE

## 2022-07-04 PROCEDURE — 84132 ASSAY OF SERUM POTASSIUM: CPT | Performed by: INTERNAL MEDICINE

## 2022-07-04 PROCEDURE — 82310 ASSAY OF CALCIUM: CPT | Performed by: INTERNAL MEDICINE

## 2022-07-04 PROCEDURE — 36415 COLL VENOUS BLD VENIPUNCTURE: CPT | Performed by: INTERNAL MEDICINE

## 2022-07-04 PROCEDURE — 120N000001 HC R&B MED SURG/OB

## 2022-07-04 RX ORDER — CYANOCOBALAMIN 1000 UG/ML
1000 INJECTION, SOLUTION INTRAMUSCULAR; SUBCUTANEOUS
Status: DISCONTINUED | OUTPATIENT
Start: 2022-07-04 | End: 2022-07-06 | Stop reason: HOSPADM

## 2022-07-04 RX ORDER — POTASSIUM CHLORIDE 1500 MG/1
40 TABLET, EXTENDED RELEASE ORAL ONCE
Status: COMPLETED | OUTPATIENT
Start: 2022-07-04 | End: 2022-07-04

## 2022-07-04 RX ADMIN — ENOXAPARIN SODIUM 40 MG: 100 INJECTION SUBCUTANEOUS at 13:00

## 2022-07-04 RX ADMIN — ACETAMINOPHEN 650 MG: 325 TABLET ORAL at 19:47

## 2022-07-04 RX ADMIN — IBUPROFEN 400 MG: 400 TABLET ORAL at 09:43

## 2022-07-04 RX ADMIN — CEFEPIME HYDROCHLORIDE 2 G: 2 INJECTION, POWDER, FOR SOLUTION INTRAVENOUS at 12:05

## 2022-07-04 RX ADMIN — CEFEPIME HYDROCHLORIDE 2 G: 2 INJECTION, POWDER, FOR SOLUTION INTRAVENOUS at 19:44

## 2022-07-04 RX ADMIN — INSULIN ASPART 2 UNITS: 100 INJECTION, SOLUTION INTRAVENOUS; SUBCUTANEOUS at 16:32

## 2022-07-04 RX ADMIN — SODIUM CHLORIDE: 4.5 INJECTION, SOLUTION INTRAVENOUS at 09:32

## 2022-07-04 RX ADMIN — POTASSIUM CHLORIDE 40 MEQ: 1500 TABLET, EXTENDED RELEASE ORAL at 07:52

## 2022-07-04 RX ADMIN — IBUPROFEN 400 MG: 400 TABLET ORAL at 02:03

## 2022-07-04 RX ADMIN — SODIUM CHLORIDE: 4.5 INJECTION, SOLUTION INTRAVENOUS at 19:44

## 2022-07-04 RX ADMIN — CEFEPIME HYDROCHLORIDE 2 G: 2 INJECTION, POWDER, FOR SOLUTION INTRAVENOUS at 03:43

## 2022-07-04 RX ADMIN — ACETAMINOPHEN 650 MG: 325 TABLET ORAL at 12:05

## 2022-07-04 RX ADMIN — CYANOCOBALAMIN 1000 MCG: 1000 INJECTION INTRAMUSCULAR; SUBCUTANEOUS at 09:33

## 2022-07-04 RX ADMIN — INSULIN ASPART 2 UNITS: 100 INJECTION, SOLUTION INTRAVENOUS; SUBCUTANEOUS at 13:00

## 2022-07-04 RX ADMIN — IBUPROFEN 400 MG: 400 TABLET ORAL at 23:28

## 2022-07-04 RX ADMIN — IBUPROFEN 400 MG: 400 TABLET ORAL at 16:26

## 2022-07-04 RX ADMIN — Medication 1 MG: at 02:03

## 2022-07-04 RX ADMIN — ENOXAPARIN SODIUM 40 MG: 100 INJECTION SUBCUTANEOUS at 02:03

## 2022-07-04 ASSESSMENT — ACTIVITIES OF DAILY LIVING (ADL)
ADLS_ACUITY_SCORE: 22

## 2022-07-04 NOTE — PLAN OF CARE
Goal Outcome Evaluation:    Problem: UTI (Urinary Tract Infection)  Goal: Improved Infection Symptoms  Outcome: Ongoing, Progressing     Problem: Infection  Goal: Absence of Infection Signs and Symptoms  Outcome: Ongoing, Progressing     Pt is A&Ox4. Pt continues to be febrile. Additional PRN medication ordered. Dizziness and lightheadedness symptoms has improved. Pt tolerated PO intake. Discharge plan for 7/4/2022 to home pending [facility] results and pt's clinical improvements.

## 2022-07-04 NOTE — PROGRESS NOTES
[facility] Daily Progress Note    Assessment/Plan:  31-year-old female with history of diabetes mellitus type 2, morbid obesity, recurrent urinary tract infections presenting with febrile illness diagnosed with right-sided pyelonephritis and sepsis secondary to SIRS criteria.  Patient is clinically improving on IV antibiotics and fluid protocol.     Right-sided pyelonephritis  Sepsis based on SIRS criteria, normotensive.   E.coli bacteremia.   Lactic acid elevation decreased from 2.5->1.2 on 7/3/22.  Procalcitonin 2.6 on 7/3/22.  Urine culture with E.coli on 7/2/22, verigene Ecoli.   Blood culture from 7/2/22 with GNB.  Repeat 7/3/22 blood culture NGTD.     CT on pelvis with findings of right-sided pyelonephritis.  Continue cefepime 2 g IV every 8 hours  Continue 1/2 normal saline 100 cc/h.  Continue tylenol and ibuprofen as needed for fever, headache.      Suspected metabolic acidosis  Likely secondary to normal saline infusion, hyperchloremic acidosis.  Change IV fluids to half-normal saline 100 cc/h  BMP pending.     Microcytic anemia  Mild thrombocytopenia  Vitamin B12 deficiency  Hemoglobin 10.4 g/dL decrease likely secondary to dilutional effect from IV fluids.  Platelet count 108 down from 140 again likely secondary to dilutional effect.  Order vitamin B12 1000mcg IM once. Follow up with PCP.   Repeat CBC in a.m.     Hypokalemia  Hypomagnesemia  Magnesium and potassium replacement protocol.     Diabetes mellitus type 2 in obese  Diet controlled at baseline.   POCT glucose QID AC HS  insulin novolog medium correction scale QID AC HS     Morbid obesity  Obstructive sleep apnea  Continue home CPAP     Elevated total bilirubin level  Trending down to 1.6 likely stress response.    Diet: Combination Diet Regular Diet Adult  DVT Prophylaxis:  Enoxaparin (Lovenox) SQ  Code Status: Full Code    Active Problems:    Lightheadedness    Pyelonephritis     LOS: 1 day     Barriers to discharge: culture results, clinical  improvement from infection.  Discharge Disposition: Home.    Subjective:  Mao reports body aches, chills and headache this morning.  Headache and body aches improved with ibuprofen.        ceFEPIme (MAXIPIME) IV  2 g Intravenous Q8H     enoxaparin ANTICOAGULANT  40 mg Subcutaneous Q12H     insulin aspart  1-7 Units Subcutaneous TID AC     insulin aspart  1-5 Units Subcutaneous At Bedtime     sodium chloride (PF)  3 mL Intracatheter Q8H     sodium chloride (PF)  3 mL Intracatheter Q8H       Objective:  Vital signs in last 24 hours:  Temp:  [98.4  F (36.9  C)-102.8  F (39.3  C)] 100.4  F (38  C)  Pulse:  [] 100  Resp:  [18] 18  BP: (113-157)/(58-87) 113/61  FiO2 (%):  [21 %] 21 %  SpO2:  [97 %-100 %] 100 %  Weight:   Weight:   @THISENCWEIGHTS(1)@  Weight change:   Body mass index is 44.71 kg/m .    Intake/Output last 3 shifts:  I/O last 3 completed shifts:  In: 120 [P.O.:120]  Out: 1800 [Urine:1800]  Intake/Output this shift:  No intake/output data recorded.    Review of Systems:   As per subjective, all others negative.    Physical Exam:    GENERAL:  Alert, appears comfortable, in no acute distress, appears stated age   HEAD:  Normocephalic, without obvious abnormality, atraumatic   NECK: Supple, symmetrical, trachea midline   BACK:   Symmetric, no curvature, ROM normal   LUNGS:   Clear to auscultation bilaterally, no rales, rhonchi, or wheezing, symmetric chest rise on inhalation, respirations unlabored   CHEST WALL:  No tenderness or deformity   HEART:  Regular rate and rhythm, S1 and S2 normal, no murmur, rub, or gallop    ABDOMEN:   Soft, non-tender, bowel sounds active all four quadrants, no masses, no organomegaly, no rebound or guarding   EXTREMITIES: Extremities normal, atraumatic, no cyanosis or edema    SKIN: Dry to touch, no exanthems in the visualized areas   NEURO: Alert, oriented x3, moves all four extremities freely, non-focal   PSYCH: Cooperative, behavior is appropriate       Imaging:  Personally Reviewed.  Results for orders placed or performed during the hospital encounter of 07/02/22   CT Abdomen Pelvis w Contrast    Impression    IMPRESSION:   1.  Right-sided renal pyelonephritis. No abscess.    2.  Normal appendix.    3.  Hepatosplenomegaly. Fatty liver.    4.  Cholecystectomy.       Lab Results:  Personally Reviewed.   Recent Labs   Lab 07/03/22 0312 07/02/22 2038   WBC 5.8 6.9   HGB 10.4* 12.7   HCT 31.2* 38.9   * 140*     Recent Labs   Lab 07/03/22 0312 07/03/22 0118 07/02/22 2038    136 136   CO2 18* 20* 24   BUN 8 9 10   ALBUMIN 2.9* 3.0* 3.5   ALKPHOS 37* 41* 43*   ALT 24 26 29   AST 24 26 28     No results for input(s): INR in the last 168 hours.    I reviewed all labs and imaging studies as of this date and I reviewed all current inpatient medications and updated them    Javier Thacker DO, MS  [facility]  Internal Medicine

## 2022-07-04 NOTE — PLAN OF CARE
PRIMARY DIAGNOSIS: PYELONEPHRITIS  OUTPATIENT/OBSERVATION GOALS TO BE MET BEFORE DISCHARGE:  Diagnostic test and consults (if applicable) complete: Yes    Vitals signs stable or return to baseline: Yes    Tolerate oral intake to maintain hydration: Yes    Pain status: Pain free.    Tolerating oral antibiotics or has plans for home infusion setup:  Yes    Return to near baseline physical activity: Yes    Discharge Planner Nurse   Safe discharge environment identified: No  Barriers to discharge: Yes       Entered by: Yecenia Christian RN 07/04/2022 12:57 AM     Please review provider order for any additional goals.   Nurse to notify provider when observation goals have been met and patient is ready for discharge.Goal Outcome Evaluation:      Pt is alert and oriented, voiding and using the call light appropriately. Pt has had fevers on and off while being here. She is on iv antibiotics.  We will continue to assess and monitor.

## 2022-07-04 NOTE — PLAN OF CARE
Problem: Plan of Care - These are the overarching goals to be used throughout the patient stay.    Goal: Optimal Comfort and Wellbeing  Outcome: Ongoing, Progressing     Problem: UTI (Urinary Tract Infection)  Goal: Improved Infection Symptoms  Outcome: Ongoing, Progressing   Goal Outcome Evaluation: Pt complained of headache most of shift - alternating tylenol and ibuprofen with some relief. Low-grade fever today. Pt independent in room, continuing on IV antibiotics. K and Mg protocols are updated and rechecks in the AM.

## 2022-07-05 LAB
ALBUMIN SERPL-MCNC: 2.6 G/DL (ref 3.5–5)
ALP SERPL-CCNC: 53 U/L (ref 45–120)
ALT SERPL W P-5'-P-CCNC: 67 U/L (ref 0–45)
ANION GAP SERPL CALCULATED.3IONS-SCNC: 6 MMOL/L (ref 5–18)
AST SERPL W P-5'-P-CCNC: 69 U/L (ref 0–40)
BACTERIA BLD CULT: ABNORMAL
BACTERIA UR CULT: ABNORMAL
BACTERIA UR CULT: ABNORMAL
BASOPHILS # BLD AUTO: 0 10E3/UL (ref 0–0.2)
BASOPHILS NFR BLD AUTO: 1 %
BILIRUB DIRECT SERPL-MCNC: 0.4 MG/DL
BILIRUB SERPL-MCNC: 0.8 MG/DL (ref 0–1)
BUN SERPL-MCNC: 5 MG/DL (ref 8–22)
CALCIUM SERPL-MCNC: 8.4 MG/DL (ref 8.5–10.5)
CHLORIDE BLD-SCNC: 113 MMOL/L (ref 98–107)
CO2 SERPL-SCNC: 19 MMOL/L (ref 22–31)
CREAT SERPL-MCNC: 0.68 MG/DL (ref 0.6–1.1)
EOSINOPHIL # BLD AUTO: 0 10E3/UL (ref 0–0.7)
EOSINOPHIL NFR BLD AUTO: 1 %
ERYTHROCYTE [DISTWIDTH] IN BLOOD BY AUTOMATED COUNT: 13.2 % (ref 10–15)
ERYTHROCYTE [DISTWIDTH] IN BLOOD BY AUTOMATED COUNT: 13.2 % (ref 10–15)
GFR SERPL CREATININE-BSD FRML MDRD: >90 ML/MIN/1.73M2
GLUCOSE BLD-MCNC: 128 MG/DL (ref 70–125)
GLUCOSE BLDC GLUCOMTR-MCNC: 130 MG/DL (ref 70–99)
GLUCOSE BLDC GLUCOMTR-MCNC: 193 MG/DL (ref 70–99)
GLUCOSE BLDC GLUCOMTR-MCNC: 200 MG/DL (ref 70–99)
GLUCOSE BLDC GLUCOMTR-MCNC: 275 MG/DL (ref 70–99)
HCT VFR BLD AUTO: 30.5 % (ref 35–47)
HCT VFR BLD AUTO: 31.3 % (ref 35–47)
HGB BLD-MCNC: 10.1 G/DL (ref 11.7–15.7)
HGB BLD-MCNC: 10.1 G/DL (ref 11.7–15.7)
IMM GRANULOCYTES # BLD: 0 10E3/UL
IMM GRANULOCYTES NFR BLD: 1 %
LYMPHOCYTES # BLD AUTO: 0.4 10E3/UL (ref 0.8–5.3)
LYMPHOCYTES NFR BLD AUTO: 28 %
MAGNESIUM SERPL-MCNC: 1.6 MG/DL (ref 1.8–2.6)
MCH RBC QN AUTO: 25.5 PG (ref 26.5–33)
MCH RBC QN AUTO: 26 PG (ref 26.5–33)
MCHC RBC AUTO-ENTMCNC: 32.3 G/DL (ref 31.5–36.5)
MCHC RBC AUTO-ENTMCNC: 33.1 G/DL (ref 31.5–36.5)
MCV RBC AUTO: 77 FL (ref 78–100)
MCV RBC AUTO: 81 FL (ref 78–100)
MONOCYTES # BLD AUTO: 0.1 10E3/UL (ref 0–1.3)
MONOCYTES NFR BLD AUTO: 7 %
NEUTROPHILS # BLD AUTO: 0.9 10E3/UL (ref 1.6–8.3)
NEUTROPHILS NFR BLD AUTO: 62 %
NRBC # BLD AUTO: 0 10E3/UL
NRBC BLD AUTO-RTO: 0 /100
PATH REPORT.COMMENTS IMP SPEC: NORMAL
PATH REPORT.COMMENTS IMP SPEC: NORMAL
PATH REPORT.FINAL DX SPEC: NORMAL
PATH REPORT.MICROSCOPIC SPEC OTHER STN: NORMAL
PATH REPORT.MICROSCOPIC SPEC OTHER STN: NORMAL
PATH REPORT.RELEVANT HX SPEC: NORMAL
PLATELET # BLD AUTO: 70 10E3/UL (ref 150–450)
PLATELET # BLD AUTO: 72 10E3/UL (ref 150–450)
POTASSIUM BLD-SCNC: 3.6 MMOL/L (ref 3.5–5)
PROT SERPL-MCNC: 6.2 G/DL (ref 6–8)
RBC # BLD AUTO: 3.89 10E6/UL (ref 3.8–5.2)
RBC # BLD AUTO: 3.96 10E6/UL (ref 3.8–5.2)
RETICS # AUTO: 0.02 10E6/UL (ref 0.01–0.11)
RETICS/RBC NFR AUTO: 0.6 % (ref 0.8–2.7)
SODIUM SERPL-SCNC: 138 MMOL/L (ref 136–145)
WBC # BLD AUTO: 1.4 10E3/UL (ref 4–11)
WBC # BLD AUTO: 1.4 10E3/UL (ref 4–11)

## 2022-07-05 PROCEDURE — 80053 COMPREHEN METABOLIC PANEL: CPT | Performed by: INTERNAL MEDICINE

## 2022-07-05 PROCEDURE — 85045 AUTOMATED RETICULOCYTE COUNT: CPT | Performed by: INTERNAL MEDICINE

## 2022-07-05 PROCEDURE — 120N000001 HC R&B MED SURG/OB

## 2022-07-05 PROCEDURE — 82248 BILIRUBIN DIRECT: CPT | Performed by: INTERNAL MEDICINE

## 2022-07-05 PROCEDURE — 250N000013 HC RX MED GY IP 250 OP 250 PS 637: Performed by: INTERNAL MEDICINE

## 2022-07-05 PROCEDURE — 250N000011 HC RX IP 250 OP 636: Performed by: INTERNAL MEDICINE

## 2022-07-05 PROCEDURE — 85027 COMPLETE CBC AUTOMATED: CPT | Performed by: INTERNAL MEDICINE

## 2022-07-05 PROCEDURE — 83735 ASSAY OF MAGNESIUM: CPT | Performed by: INTERNAL MEDICINE

## 2022-07-05 PROCEDURE — 85060 BLOOD SMEAR INTERPRETATION: CPT | Performed by: PATHOLOGY

## 2022-07-05 PROCEDURE — 36415 COLL VENOUS BLD VENIPUNCTURE: CPT | Performed by: INTERNAL MEDICINE

## 2022-07-05 PROCEDURE — 99233 SBSQ HOSP IP/OBS HIGH 50: CPT | Performed by: INTERNAL MEDICINE

## 2022-07-05 RX ORDER — CEFDINIR 300 MG/1
300 CAPSULE ORAL EVERY 12 HOURS SCHEDULED
Status: DISCONTINUED | OUTPATIENT
Start: 2022-07-05 | End: 2022-07-06 | Stop reason: HOSPADM

## 2022-07-05 RX ADMIN — ENOXAPARIN SODIUM 40 MG: 100 INJECTION SUBCUTANEOUS at 03:31

## 2022-07-05 RX ADMIN — IBUPROFEN 400 MG: 400 TABLET ORAL at 08:18

## 2022-07-05 RX ADMIN — CEFEPIME HYDROCHLORIDE 2 G: 2 INJECTION, POWDER, FOR SOLUTION INTRAVENOUS at 03:32

## 2022-07-05 RX ADMIN — ACETAMINOPHEN 650 MG: 325 TABLET ORAL at 17:48

## 2022-07-05 RX ADMIN — INSULIN ASPART 2 UNITS: 100 INJECTION, SOLUTION INTRAVENOUS; SUBCUTANEOUS at 12:50

## 2022-07-05 RX ADMIN — IBUPROFEN 400 MG: 400 TABLET ORAL at 17:48

## 2022-07-05 RX ADMIN — CEFDINIR 300 MG: 300 CAPSULE ORAL at 09:25

## 2022-07-05 RX ADMIN — INSULIN ASPART 2 UNITS: 100 INJECTION, SOLUTION INTRAVENOUS; SUBCUTANEOUS at 17:42

## 2022-07-05 RX ADMIN — ACETAMINOPHEN 650 MG: 325 TABLET ORAL at 08:18

## 2022-07-05 RX ADMIN — CEFDINIR 300 MG: 300 CAPSULE ORAL at 19:24

## 2022-07-05 ASSESSMENT — ACTIVITIES OF DAILY LIVING (ADL)
ADLS_ACUITY_SCORE: 22

## 2022-07-05 NOTE — PROGRESS NOTES
[facility] Daily Progress Note    Assessment/Plan:  31-year-old female with history of diabetes mellitus type 2, morbid obesity, recurrent urinary tract infections presenting with febrile illness diagnosed with right-sided pyelonephritis and sepsis secondary to SIRS criteria.  Developed pancytopenia, and diagnosed with vitamin B12 deficiency.       Right-sided pyelonephritis  Sepsis based on SIRS criteria, normotensive.   E.coli bacteremia.   Lactic acid elevation decreased from 2.5->1.2 on 7/3/22.  Procalcitonin 2.6 on 7/3/22.  Urine culture with E.coli pansusceptible on 7/2/22.  Blood culture from 7/2/22 pansusceptible E.coli.    Repeat 7/3/22 blood culture NGTD.     CT on pelvis with findings of right-sided pyelonephritis.  Order omnicef 300 mg po BID.  Discontinue cefepime started 7/3/22-7/5/22.  Continue 1/2 normal saline 100 cc/h.  Continue tylenol and ibuprofen as needed for fever, headache.      Suspected metabolic acidosis  Likely secondary to normal saline infusion, hyperchloremic acidosis.  Change IV fluids to half-normal saline 100 cc/h  BMP pending.     Pancytopenia  Vitamin B12 deficiency  WBC 1.4, hemoglobin 10.1g/dL, platelet 72.   Vitamin B12 level 199 on 7/3/22.   vitamin B12 1000mcg IM once on 7/4/22.   Order CBC with differential, blood morphology review by pathology.      Hypokalemia  Hypomagnesemia  Magnesium and potassium replacement protocol.     Diabetes mellitus type 2 in obese  Diet controlled at baseline.   POCT glucose QID AC HS  insulin novolog medium correction scale QID AC HS     Morbid obesity  Obstructive sleep apnea  Continue home CPAP     Elevated total bilirubin level  Trending down to 1.6 likely stress response.    Diet: Combination Diet Regular Diet Adult  DVT Prophylaxis:  DOAC  Code Status: Full Code    Active Problems:    Lightheadedness    Pyelonephritis     LOS: 2 days     Barriers to discharge: pancytopenia evaluation.  Discharge Disposition: Home.    Subjective:  Vijay is sitting  up in chair during her interview today.  She reports she feels better today than yesterday.  She denies headache vision changes, nausea or vomiting, flank tenderness, dysuria.  She denies chest pain or shortness of breath.    cefdinir  300 mg Oral Q12H Counts include 234 beds at the [facility] (08/20)     cyanocobalamin  1,000 mcg Intramuscular Q30 Days     enoxaparin ANTICOAGULANT  40 mg Subcutaneous Q12H     insulin aspart  1-7 Units Subcutaneous TID AC     insulin aspart  1-5 Units Subcutaneous At Bedtime     sodium chloride (PF)  3 mL Intracatheter Q8H       Objective:  Vital signs in last 24 hours:  Temp:  [98  F (36.7  C)-99.4  F (37.4  C)] 98.3  F (36.8  C)  Pulse:  [80-95] 91  Resp:  [16-18] 16  BP: (105-123)/(54-88) 123/79  SpO2:  [97 %-99 %] 97 %  Weight:   Weight:   @THISENCWEIGHTS(1)@  Weight change:   Body mass index is 44.71 kg/m .    Intake/Output last 3 shifts:  I/O last 3 completed shifts:  In: 1039 [I.V.:1039]  Out: -   Intake/Output this shift:  No intake/output data recorded.    Review of Systems:   As per subjective, all others negative.    Physical Exam:    GENERAL:  Alert, obese, appears comfortable, in no acute distress, appears stated age   HEAD:  Normocephalic, without obvious abnormality, atraumatic   NECK: Supple, symmetrical, trachea midline   BACK:   Symmetric, no curvature, ROM normal   LUNGS:   Clear to auscultation bilaterally, no rales, rhonchi, or wheezing, symmetric chest rise on inhalation, respirations unlabored   HEART:  Regular rate and rhythm, S1 and S2 normal, no murmur, rub, or gallop    ABDOMEN:   Soft, non-tender, bowel sounds active all four quadrants, no masses, no organomegaly, no rebound or guarding   EXTREMITIES: Extremities normal, atraumatic, no cyanosis or edema    SKIN: Dry to touch, no exanthems in the visualized areas   NEURO: Alert, oriented x3, moves all four extremities freely, non-focal   PSYCH: Cooperative, behavior is appropriate      Imaging:  Personally Reviewed.  Results for orders placed  or performed during the hospital encounter of 07/02/22   CT Abdomen Pelvis w Contrast    Impression    IMPRESSION:   1.  Right-sided renal pyelonephritis. No abscess.  2.  Normal appendix.  3.  Hepatosplenomegaly. Fatty liver.  4.  Cholecystectomy.     Lab Results:  Personally Reviewed.   Recent Labs   Lab 07/05/22 0711 07/03/22 0312 07/02/22 2038   WBC 1.4* 5.8 6.9   HGB 10.1* 10.4* 12.7   HCT 31.3* 31.2* 38.9   PLT 72* 108* 140*     Recent Labs   Lab 07/05/22  0711 07/04/22  0605 07/03/22 0312 07/03/22  0118 07/02/22 2038    137 136 136 136   CO2 19* 16* 18* 20* 24   BUN 5* 6* 8 9 10   ALBUMIN  --   --  2.9* 3.0* 3.5   ALKPHOS  --   --  37* 41* 43*   ALT  --   --  24 26 29   AST  --   --  24 26 28     No results for input(s): INR in the last 168 hours.    I reviewed all labs and imaging studies as of this date and I reviewed all current inpatient medications and updated them    Javier Thacker DO, MS  [facility] Service  Internal Medicine

## 2022-07-05 NOTE — PLAN OF CARE
Problem: Plan of Care - These are the overarching goals to be used throughout the patient stay.    Goal: Readiness for Transition of Care  7/5/2022 1437 by Sheree Ni RN  Outcome: Ongoing, Progressing     Problem: Diabetes Comorbidity  Goal: Blood Glucose Level Within Targeted Range  Outcome: Ongoing, Progressing   Goal Outcome Evaluation:      A&Ox4, able to make needs known. Independent in room, denies lightheadedness/weakness/dizziness. Reported headache in morning, responded well to PRN acetaminophen and ibuprofen. On telemetry. Spouse in room with patient through much of day. Pt eager to be discharged home, briefly discussed anticipated discharge for tomorrow and progress toward this, including abx changed to PO (vs IV). Pt concerned about fatty liver result she is seeing in [facility], briefly discussed with pt that this is related to DM and can be reversed, best to discuss goals with PCP when discharged.

## 2022-07-05 NOTE — PLAN OF CARE
"PRIMARY DIAGNOSIS: \"GENERIC\" NURSING  OUTPATIENT/OBSERVATION GOALS TO BE MET BEFORE DISCHARGE:  ADLs back to baseline: Yes    Activity and level of assistance: Ambulating independently.    Pain status: Improved-controlled with oral pain medications.    Return to near baseline physical activity: Yes     Discharge Planner Nurse   Safe discharge environment identified: Yes  Barriers to discharge:  still waiting on cultures to come back from lab       Entered by: Yecenia Christian RN 07/05/2022 12:17 AM     Please review provider order for any additional goals.   Nurse to notify provider when observation goals have been met and patient is ready for discharge.Goal Outcome Evaluation:      Pt is alert and oriented, voiding and using the call light appropriately.  She is independent in her room.  Vital signs are stable,  telemetry is normal sinus rhythm.  Plan is to go home with family when medically stable.  We will continue to assess and monitor.                  "

## 2022-07-05 NOTE — PROGRESS NOTES
Care Management Follow Up    Length of Stay (days): 2    Expected Discharge Date: 07/06/2022     Concerns to be Addressed: clinical improvement        Patient plan of care discussed at interdisciplinary rounds: Yes    Anticipated Discharge Disposition: Home     Anticipated Discharge Services:  Home Self Care      Education Provided on the Discharge Plan:  CM met with patient. AVS per bedside RN.    Patient/Family in Agreement with the Plan: yes        Additional Information:  Chart reviewed. CM met with patient. Patient lives with  and their kids. Patient states the MD just saw her and she will stay another day. Family will transport home at hospital discharge.       Sherley De La Rosa RN

## 2022-07-05 NOTE — PLAN OF CARE
Problem: UTI (Urinary Tract Infection)  Goal: Improved Infection Symptoms  Outcome: Adequate for Care Transition     Problem: Infection  Goal: Absence of Infection Signs and Symptoms  Outcome: Adequate for Care Transition   Goal Outcome Evaluation:     Pt afebrile. Denies any UTI sx's. Receiving IV cefepime every 8 hours and IV fluids continuous at 100mL. Pt up independently in room with steady gait. Alert and oriented and able to verbalize needs. Gave pt ibuprofen and Tylenol each x 1 this shift for a intermittent headache that comes and goes.  before supper and pt given sliding scale insulin. Visitor present in room with pt. NSR on tele.

## 2022-07-05 NOTE — PROGRESS NOTES
Message sent to provider Javier Thacker at approx 1300:     HIRA Bentley [id number]  Rm 342 MH  Holding lovenox due to low platelets. Pt also has questions about fatty liver, as she saw this in her mychart. Can you advise? Thank you!    Awaiting response.

## 2022-07-06 VITALS
OXYGEN SATURATION: 97 % | HEART RATE: 93 BPM | BODY MASS INDEX: 44.52 KG/M2 | HEIGHT: 66 IN | SYSTOLIC BLOOD PRESSURE: 129 MMHG | RESPIRATION RATE: 16 BRPM | WEIGHT: 277 LBS | DIASTOLIC BLOOD PRESSURE: 81 MMHG | TEMPERATURE: 98.7 F

## 2022-07-06 LAB
ALBUMIN SERPL-MCNC: 2.7 G/DL (ref 3.5–5)
ALP SERPL-CCNC: 69 U/L (ref 45–120)
ALT SERPL W P-5'-P-CCNC: 77 U/L (ref 0–45)
AST SERPL W P-5'-P-CCNC: 60 U/L (ref 0–40)
BILIRUB DIRECT SERPL-MCNC: 0.3 MG/DL
BILIRUB SERPL-MCNC: 0.7 MG/DL (ref 0–1)
CREAT SERPL-MCNC: 0.69 MG/DL (ref 0.6–1.1)
ERYTHROCYTE [DISTWIDTH] IN BLOOD BY AUTOMATED COUNT: 13.2 % (ref 10–15)
GFR SERPL CREATININE-BSD FRML MDRD: >90 ML/MIN/1.73M2
GLUCOSE BLDC GLUCOMTR-MCNC: 146 MG/DL (ref 70–99)
HCT VFR BLD AUTO: 29.7 % (ref 35–47)
HGB BLD-MCNC: 10 G/DL (ref 11.7–15.7)
MAGNESIUM SERPL-MCNC: 1.7 MG/DL (ref 1.8–2.6)
MCH RBC QN AUTO: 25.6 PG (ref 26.5–33)
MCHC RBC AUTO-ENTMCNC: 33.7 G/DL (ref 31.5–36.5)
MCV RBC AUTO: 76 FL (ref 78–100)
PLATELET # BLD AUTO: 101 10E3/UL (ref 150–450)
PROT SERPL-MCNC: 6.5 G/DL (ref 6–8)
RBC # BLD AUTO: 3.9 10E6/UL (ref 3.8–5.2)
WBC # BLD AUTO: 1.6 10E3/UL (ref 4–11)

## 2022-07-06 PROCEDURE — 85027 COMPLETE CBC AUTOMATED: CPT | Performed by: INTERNAL MEDICINE

## 2022-07-06 PROCEDURE — 250N000013 HC RX MED GY IP 250 OP 250 PS 637: Performed by: INTERNAL MEDICINE

## 2022-07-06 PROCEDURE — 99239 HOSP IP/OBS DSCHRG MGMT >30: CPT | Performed by: FAMILY MEDICINE

## 2022-07-06 PROCEDURE — 82565 ASSAY OF CREATININE: CPT | Performed by: INTERNAL MEDICINE

## 2022-07-06 PROCEDURE — 82040 ASSAY OF SERUM ALBUMIN: CPT | Performed by: FAMILY MEDICINE

## 2022-07-06 PROCEDURE — 250N000012 HC RX MED GY IP 250 OP 636 PS 637: Performed by: INTERNAL MEDICINE

## 2022-07-06 PROCEDURE — 36415 COLL VENOUS BLD VENIPUNCTURE: CPT | Performed by: INTERNAL MEDICINE

## 2022-07-06 PROCEDURE — 83735 ASSAY OF MAGNESIUM: CPT | Performed by: INTERNAL MEDICINE

## 2022-07-06 RX ORDER — METHOCARBAMOL 500 MG/1
500 TABLET, FILM COATED ORAL 3 TIMES DAILY PRN
Qty: 20 TABLET | Refills: 0 | Status: SHIPPED | OUTPATIENT
Start: 2022-07-06 | End: 2022-12-08

## 2022-07-06 RX ORDER — CYANOCOBALAMIN 1000 UG/ML
1000 INJECTION, SOLUTION INTRAMUSCULAR; SUBCUTANEOUS WEEKLY
Qty: 3 ML | Refills: 0
Start: 2022-07-06 | End: 2022-07-21

## 2022-07-06 RX ORDER — ACETAMINOPHEN 325 MG/1
650 TABLET ORAL EVERY 4 HOURS PRN
COMMUNITY
Start: 2022-07-06 | End: 2022-12-08

## 2022-07-06 RX ORDER — CEFDINIR 300 MG/1
300 CAPSULE ORAL EVERY 12 HOURS
Qty: 18 CAPSULE | Refills: 0 | Status: SHIPPED | OUTPATIENT
Start: 2022-07-06 | End: 2022-07-15

## 2022-07-06 RX ADMIN — CEFDINIR 300 MG: 300 CAPSULE ORAL at 08:08

## 2022-07-06 RX ADMIN — ACETAMINOPHEN 650 MG: 325 TABLET ORAL at 08:08

## 2022-07-06 RX ADMIN — IBUPROFEN 400 MG: 400 TABLET ORAL at 08:09

## 2022-07-06 RX ADMIN — INSULIN ASPART 1 UNITS: 100 INJECTION, SOLUTION INTRAVENOUS; SUBCUTANEOUS at 09:14

## 2022-07-06 ASSESSMENT — ACTIVITIES OF DAILY LIVING (ADL)
ADLS_ACUITY_SCORE: 22

## 2022-07-06 NOTE — DISCHARGE SUMMARY
[facility]  Hospitalist Discharge Summary      Date of Admission:  7/2/2022  Date of Discharge:  7/6/2022 11:27 AM  Discharging Provider: Geraldo Merritt MD  Discharge Service: Hospitalist Service    Discharge Diagnoses   Pyelonephritis on the right    Follow-ups Needed After Discharge   Follow-up Appointments     Follow-up and recommended labs and tests       Follow up with primary care provider, Maggy Guerrero, within 7 days for   hospital follow- up and regarding new diagnosis.  The following labs/tests   are recommended: Patient should have daily blood sugars reviewed, weekly   B12 shot 2/4 should be administered, starting metformin or Lantus should   be reviewed.             Unresulted Labs Ordered in the Past 30 Days of this Admission     Date and Time Order Name Status Description    7/3/2022 12:39 PM Blood Culture Peripheral Blood Preliminary       These results will be followed up by hospitalist.    Discharge Disposition   Discharged to home  Condition at discharge: Stable    Hospital Course   31-year-old female with history of diabetes mellitus type 2, morbid obesity, recurrent urinary tract infections presenting with febrile illness diagnosed with right-sided pyelonephritis and sepsis secondary to SIRS criteria.  Developed pancytopenia, and diagnosed with vitamin B12 deficiency.       Right-sided pyelonephritis  Sepsis based on SIRS criteria, normotensive.   E.coli bacteremia.   Lactic acid elevation decreased from 2.5->1.2 on 7/3/22.  Procalcitonin 2.6 on 7/3/22.  Urine culture with E.coli pansusceptible on 7/2/22.  Blood culture from 7/2/22 pansusceptible E.coli.    Repeat 7/3/22 blood culture NGTD.     CT on pelvis with findings of right-sided pyelonephritis.  Complete full course of omnicef 300 mg po BID.       Suspected metabolic acidosis  Likely secondary to normal saline infusion, hyperchloremic acidosis.  Changed IV fluids to half-normal saline 100  cc/h       Pancytopenia  Vitamin B12 deficiency  WBC 1.4, hemoglobin 10.1g/dL, platelet 72.   Vitamin B12 level 199 on 7/3/22.   vitamin B12 1000mcg IM once on 7/4/22.   She should have weekly b12 shots followed by monthly thereafter.  She would like to follow up with her PCP about this.     Hypokalemia  Hypomagnesemia  Resolved.     Diabetes mellitus type 2 in obese  Diet controlled at baseline.   Strongly advised starting metformin.  She notes concern about GI symptoms.  I also suggested lantus.  - She would like to try lifestyle modification, check her blood sugars and follow up to discuss with her PCP.     Morbid obesity  Obstructive sleep apnea  Continue home CPAP     Elevated total bilirubin level  Trending down to 1.6 likely stress response.           Consultations This Hospital Stay   ADVANCE DIRECTIVE IP CONSULT  DIABETES EDUCATION IP CONSULT    Code Status   Prior    Time Spent on this Encounter   I, Geraldo Merritt MD, personally saw the patient today and spent greater than 30 minutes discharging this patient.       Geraldo Merritt MD  [address] [address]  Phone: [phone]  Fax: [phone]  ______________________________________________________________________    Physical Exam   Vital Signs:                    Weight: 277 lbs 0 oz  Constitutional: awake, alert, cooperative, no apparent distress, and appears stated age  Respiratory: No increased work of breathing, good air exchange,   Cardiovascular:  regular rate and rhythm,   GI: normal bowel sounds, soft, non-distended, non-tender  Neurologic: Awake, alert, oriented to name, place and time.  Cranial nerves II-XII are grossly intact.  Moves all ext.    Primary Care Physician   Maggy Guerrero    Discharge Orders      Reason for your hospital stay    Pyelonephritis     Follow-up and recommended labs and tests     Follow up with primary care provider, Maggy Guerrero, within  7 days for hospital follow- up and regarding new diagnosis.  The following labs/tests are recommended: Patient should have daily blood sugars reviewed, weekly B12 shot 2/4 should be administered, starting metformin or Lantus should be reviewed.     Activity    Your activity upon discharge: activity as tolerated     Diet    Follow this diet upon discharge: Diabetic Diet       Significant Results and Procedures   Results for orders placed or performed during the hospital encounter of 07/02/22   CT Abdomen Pelvis w Contrast    Narrative    EXAM: CT ABDOMEN PELVIS W CONTRAST  LOCATION: [facility]  DATE/TIME: 7/2/2022 10:51 PM    INDICATION: Vomiting and pain.  COMPARISON: None.  TECHNIQUE: CT scan of the abdomen and pelvis was performed following injection of IV contrast. Multiplanar reformats were obtained. Dose reduction techniques were used.  CONTRAST: 100 ml Isovue 370.    FINDINGS:   LOWER CHEST: Normal.    HEPATOBILIARY: Hepatomegaly with fatty infiltration of the liver.    PANCREAS: Normal.    SPLEEN: Splenomegaly.    ADRENAL GLANDS: Normal.    KIDNEYS/BLADDER: There is patchy decreased enhancement scattered throughout the right kidney with right perinephric soft tissue stranding compatible with multifocal pyelonephritis. No evidence for abscess. No ureteric stone or hydronephrosis.    BOWEL: Normal appendix.    LYMPH NODES: Normal.    VASCULATURE: Unremarkable.    PELVIC ORGANS: Dominant follicle left ovary.    MUSCULOSKELETAL: Normal.      Impression    IMPRESSION:   1.  Right-sided renal pyelonephritis. No abscess.    2.  Normal appendix.    3.  Hepatosplenomegaly. Fatty liver.    4.  Cholecystectomy.       Discharge Medications   Discharge Medication List as of 7/6/2022 10:51 AM      START taking these medications    Details   acetaminophen (TYLENOL) 325 MG tablet Take 2 tablets (650 mg) by mouth every 4 hours as needed for mild pain, other, fever or headaches (and adjunct with  moderate or severe pain or per patient request), OTC      cefdinir (OMNICEF) 300 MG capsule Take 1 capsule (300 mg) by mouth every 12 hours for 9 days, Disp-18 capsule, R-0, E-Prescribe      cyanocobalamin (CYANOCOBALAMIN) 1000 MCG/ML injection Inject 1 mL (1,000 mcg) into the muscle once a week for 3 doses, Disp-3 mL, R-0, No Print OutShould be given by PCP office         CONTINUE these medications which have CHANGED    Details   methocarbamol (ROBAXIN) 500 MG tablet Take 1 tablet (500 mg) by mouth 3 times daily as needed for muscle spasms, Disp-20 tablet, R-0, E-Prescribe         CONTINUE these medications which have NOT CHANGED    Details   etonogestrel (NEXPLANON) 68 MG IMPL 1 each (68 mg) by Subdermal route continuous, No Print Out      ACCU-CHEK GUIDE test strip USE TO TEST BLOOD SUGAR 4 TIMES DAILY OR AS DIRECTED./ ARJUN HUDSON NTSUAS NTSHAV QAB ZIB 4 JULIA MATTHEWSA, LEXUS, Historical      Acetone, Urine, Test (KETONE TEST) STRP USE TO TEST URINE ONCE DAILY IN THE MORNING BEFORE EATING OR DRINKING. *50 DAYS*, Historical      Alcohol Swabs (ALCOHOL PREP) 70 % PADS USE 1 EACH 4 TIMES DAILY/ ARJUN HUDSON SIV 4 ZAUG, Historical      blood glucose monitoring (SOFTCLIX) lancets USE TO TEST BLOOD SUGAR 4 TIMES DAILY OR AS DIRECTED./ TXCEDRICA KIMBERUB NTSUAS NTSHAV QAB ZIB 4 ZASYED MATTHEWSA, Historical      Blood Glucose Monitoring Suppl (ACCU-CHEK GUIDE ME) w/Device KIT USE 1 EACH ONCE FOR 1 DOSE, Historical         STOP taking these medications       Insulin Pen Needle (PEN NEEDLES) 32G X 4 MM MISC Comments:   Reason for Stopping:             Allergies   No Known Allergies

## 2022-07-06 NOTE — PLAN OF CARE
"PRIMARY DIAGNOSIS: \"GENERIC\" NURSING  OUTPATIENT/OBSERVATION GOALS TO BE MET BEFORE DISCHARGE:  ADLs back to baseline: Yes    Activity and level of assistance: Ambulating independently.    Pain status: Pain free.    Return to near baseline physical activity: Yes     Discharge Planner Nurse   Safe discharge environment identified: Yes  Barriers to discharge: no         Entered by: Yecenia Christian RN 07/06/2022 3:29 AM     Please review provider order for any additional goals.   Nurse to notify provider when observation goals have been met and patient is ready for discharge.Goal Outcome Evaluation:    Pt is ready to go home.  States she would like to discharge by noon at the latest.  We will continue to assess and monitor.                       "

## 2022-07-06 NOTE — PROGRESS NOTES
Care Management Discharge Note    Discharge Date: 07/06/2022       Discharge Disposition: Home    Discharge Services:  Home with family    Discharge Transportation: family or friend will provide    Education Provided on the Discharge Plan:    AVS per bedside RN.    Persons Notified of Discharge Plans: patient    Patient/Family in Agreement with the Plan: yes        Additional Information:  Chart reviewed. Patient discharge per bedside RN.  No CM needs.         Sherley De La Rosa, RN

## 2022-07-06 NOTE — PLAN OF CARE
Goal Outcome Evaluation:      Note from 0700 to discharge. Discharge paperwork addressed thoroughly with pt. Questions answered and addressed at length by writer. AVS sent with pt. IV access discontinued. No issues noted. VSS, no shortness of breath.

## 2022-07-06 NOTE — CONSULTS
DIABETES CARE    Situation:  Consulted by RN for Diabetes Education.  31 year old female with type 2 Diabetes. Patient was admitted for febrile illness diagnosed with right-sided pyelonephritis and sepsis secondary to SIRS criteria.    Background:  Related Co-morbidities include: obesity, STAR, insulin controlled gestational dibetes  PCP: Maggy Guerrero  Social: Lives at home with  and children    Diabetes History:   Patient with history of gestational diabetes.    Meds for BG Management PTA:  Diet controlled    Current Inpatient Meds for BG Management:  1 drops 50 Correction Scale    Labs:  Hemoglobin A1C: 8.0%   10/12/16 10:27 10/24/17 10:11 03/28/18 11:04 10/08/19 09:59 06/04/21 08:53 08/02/21 13:07 11/23/21 10:46 03/02/22 09:59 03/30/22 12:04 07/02/22 20:38   Hemoglobin A1C 5.8 6.1 (H) 6.3 (H) 5.3 5.9 (H) 6.1 (H) 5.5 6.2 (H) 5.8 (H) 8.0 (H)   Hgb: 10.0 g/dL    SCr: 0.69 mg/dL    GFR: >90 mL/min     Blood Glucose POC:    07/05/22 08:08 07/05/22 12:35 07/05/22 17:23 07/05/22 21:01   GLUCOSE BY METER POCT 130 (H) 193 (H) 200 (H) 275 (H)     Diet Order: Regular   Intake: 100%   Weight: 125.6 kg    BMI: 44.7 kg/m^2    DM EDUCATION/COUNSELING:  Barriers to Learning and/or DM Self-Management: None  Previous DM Education: Yes  Current education and/or visit with patient:  Educated/reviewed diabetes basics: pathophysiology, hyperglycemia, long term complications, treatment.  Educated/reviewed hypoglycemia: sx, causes, treatment.  Explained normal/goals of blood sugar control, A1C.  Reviewed use of home glucose meter.  Educated on normal pancreas function, how oral medications work, GLP1 meds.  Specific medications were explained including how they acted on blood.   Carbohydrates were briefly discussed and their effect on BG. Reinforced healthy eating.    Written handouts given on all education provided.     Created goals with patient:  Check BG at least twice daily, fasting and 2 hours after a meal. Keep log.  "Encouraged follow-up with PCP regarding medication management.     Assessment:  Patient has a good understanding of diabetes given her history of insulin requiring gestational diabetes. Has never used oral medications. She notes several stressors since giving birth in March including two deaths in the family. Acknowledges increase risk for T2DM with hx of gestational. Open to medication, watching oral intake (consistent CHO), and physical activity.    Recommendations:  1. Consider use of oral medication and/or GLP-1 to assist with blood glucose management.   2. Check BG at least twice daily (fasting and 2 hours after a meal) and keep log to notice BG trends.  3. Follow-up closely with PCP.    Refer to \"Guidelines for Insulin Initiation and Care in Hospitalized Adults\"  link in Diabetes Management Order set for dosing guidelines.  Hospital BG goals for blood glucose levels are < 180 for improved health outcomes.    F/U Plans:  PCP within 2 weeks of discharge.    DISCHARGE NEEDS:  RX needed at DC (preferred by patient's insurance):  1. Accuchek Guide test strips, 2 boxes of 50  2. SoftClix lancets, 1 box  Non-insulin requiring; Diagnosis code: E11.65  To test BG twice daily    Thank you,   Amber Sales RDN, LD, Diabetes Educator    [facility]  [address] [place] [address]  [email or web address]   Office: [phone]  Pager: [phone]  "

## 2022-07-06 NOTE — PLAN OF CARE
Goal Outcome Evaluation:    Problem: UTI (Urinary Tract Infection)  Goal: Improved Infection Symptoms  Outcome: Ongoing, Progressing     Problem: Infection  Goal: Absence of Infection Signs and Symptoms  Outcome: Ongoing, Progressing     Problem: Diabetes Comorbidity  Goal: Blood Glucose Level Within Targeted Range  Outcome: Ongoing, Progressing       Pt is A&Ox4. Pt denies pain. VSS. Pt had questions about Fatty Liver Disease. Education information given regarding Fatty Liver Disease and prevention and how to prevent UTI. Discharge plan for 7/6/2022 to home.

## 2022-07-08 LAB — BACTERIA BLD CULT: NO GROWTH

## 2022-07-17 ENCOUNTER — HEALTH MAINTENANCE LETTER (OUTPATIENT)
Age: 32
End: 2022-07-17

## 2022-08-12 ENCOUNTER — MEDICAL CORRESPONDENCE (OUTPATIENT)
Dept: FAMILY MEDICINE | Facility: CLINIC | Age: 32
End: 2022-08-12

## 2022-09-02 ENCOUNTER — NURSE TRIAGE (OUTPATIENT)
Dept: NURSING | Facility: CLINIC | Age: 32
End: 2022-09-02

## 2022-09-02 NOTE — TELEPHONE ENCOUNTER
Pt calling with concerns about;    States her Brother tested positive for monkey pox today  Pt and her family were exposed to brother on 22 at a    Brother had a fever on that day but no rash or open sores at all.    Pt Denies;  Fever  Signs/symptoms of dehydration    According to the protocol, patient should be able to manage this at home.  Care advice given/when to call back. Patient verbalizes understanding and requests a message be routed to PCP to further advise.      Courtney Christian RN, Nurse Advisor 10:26 PM 2022  COVID 19 Nurse Triage Plan/Patient Instructions    Please be aware that novel coronavirus (COVID-19) may be circulating in the community. If you develop symptoms such as fever, cough, or SOB or if you have concerns about the presence of another infection including coronavirus (COVID-19), please contact your health care provider or visit [email or web address].     Disposition/Instructions    Virtual Visit with provider recommended. Reference Visit Selection Guide.    Thank you for taking steps to prevent the spread of this virus.  o Limit your contact with others.  o Wear a simple mask to cover your cough.  o Wash your hands well and often.    Reason for Disposition    Monkeypox, questions about    Additional Information    Negative: MONKEYPOX EXPOSURE in past 21 days (e.g., direct skin contact such as sex, recent travel to [place] or [place]) and ANY SYMPTOMS of monkeypox (e.g., rash, fever, muscle aches, or swollen lymph nodes)    Negative: At risk for monkeypox (men-who-have-sex-with-men) and possible exposure (e.g., multiple sex partners in past 21 days) and ANY SYMPTOMS of monkeypox (e.g., rash, fever, muscle aches, or swollen lymph nodes)    Negative: Diagnosed with monkeypox by doctor (or NP/PA)    Negative: Widespread rash and last exposure to monkeypox > 21 days ago    Negative: Localized rash and last exposure to monkeypox > 21 days ago    Negative: MONKEYPOX  EXPOSURE in past 21 days (e.g., direct skin contact such as sex, recent travel to [place] or [place]) and NO symptoms    Negative: MONKEYPOX EXPOSURE in past 21 days and exposed person is a healthcare worker who was NOT using all recommended personal protective equipment (e.g., a respirator-N95 mask, gloves and NO symptoms    Negative: At risk for monkeypox (men-who-have-sex-with-men) and possible exposure (e.g., multiple sex partners in past 21 days) and NO symptoms    Negative: MONKEYPOX EXPOSURE > 21 days ago (over 3 weeks)    Protocols used: MONKEYPOX - EXPOSURE-A-[place]

## 2022-09-06 NOTE — TELEPHONE ENCOUNTER
RN attempted to call patient to relay Dr. Guerrero's message below.    Patient did not answer. Left message to patient to call the clinic back.          Cherelle Glynn RN  [facility]

## 2022-09-07 NOTE — TELEPHONE ENCOUNTER
RN received a call back from patient.    RN relayed Dr. Guerrero's message below.    Patient stated she has no symptoms at all. Everyone is healthy in the household.     RN will route this encounter to  to review and advise.            Cherelle Glynn RN  [facility]

## 2022-09-24 ENCOUNTER — HEALTH MAINTENANCE LETTER (OUTPATIENT)
Age: 32
End: 2022-09-24

## 2022-11-01 ENCOUNTER — OFFICE VISIT (OUTPATIENT)
Dept: FAMILY MEDICINE | Facility: CLINIC | Age: 32
End: 2022-11-01
Payer: COMMERCIAL

## 2022-11-01 VITALS
RESPIRATION RATE: 20 BRPM | BODY MASS INDEX: 43.09 KG/M2 | HEART RATE: 78 BPM | SYSTOLIC BLOOD PRESSURE: 134 MMHG | WEIGHT: 267 LBS | DIASTOLIC BLOOD PRESSURE: 86 MMHG

## 2022-11-01 DIAGNOSIS — R79.89 LOW VITAMIN B12 LEVEL: ICD-10-CM

## 2022-11-01 DIAGNOSIS — R05.3 CHRONIC COUGH: ICD-10-CM

## 2022-11-01 DIAGNOSIS — E11.9 TYPE 2 DIABETES MELLITUS WITHOUT COMPLICATION, WITHOUT LONG-TERM CURRENT USE OF INSULIN (H): Primary | ICD-10-CM

## 2022-11-01 DIAGNOSIS — E66.01 MORBID OBESITY (H): ICD-10-CM

## 2022-11-01 LAB
ANION GAP SERPL CALCULATED.3IONS-SCNC: 9 MMOL/L (ref 7–15)
BUN SERPL-MCNC: 8.3 MG/DL (ref 6–20)
CALCIUM SERPL-MCNC: 9.6 MG/DL (ref 8.6–10)
CHLORIDE SERPL-SCNC: 104 MMOL/L (ref 98–107)
CREAT SERPL-MCNC: 0.7 MG/DL (ref 0.51–0.95)
DEPRECATED HCO3 PLAS-SCNC: 26 MMOL/L (ref 22–29)
GFR SERPL CREATININE-BSD FRML MDRD: >90 ML/MIN/1.73M2
GLUCOSE SERPL-MCNC: 132 MG/DL (ref 70–99)
HBA1C MFR BLD: 8.3 % (ref 0–5.6)
POTASSIUM SERPL-SCNC: 4 MMOL/L (ref 3.4–5.3)
SODIUM SERPL-SCNC: 139 MMOL/L (ref 136–145)
VIT B12 SERPL-MCNC: 392 PG/ML (ref 232–1245)

## 2022-11-01 PROCEDURE — 91312 COVID-19,PF,PFIZER BOOSTER BIVALENT: CPT | Performed by: FAMILY MEDICINE

## 2022-11-01 PROCEDURE — 99214 OFFICE O/P EST MOD 30 MIN: CPT | Mod: 25 | Performed by: FAMILY MEDICINE

## 2022-11-01 PROCEDURE — 82607 VITAMIN B-12: CPT | Performed by: FAMILY MEDICINE

## 2022-11-01 PROCEDURE — 0124A COVID-19,PF,PFIZER BOOSTER BIVALENT: CPT | Performed by: FAMILY MEDICINE

## 2022-11-01 PROCEDURE — 96372 THER/PROPH/DIAG INJ SC/IM: CPT | Performed by: FAMILY MEDICINE

## 2022-11-01 PROCEDURE — 80048 BASIC METABOLIC PNL TOTAL CA: CPT | Performed by: FAMILY MEDICINE

## 2022-11-01 PROCEDURE — 36415 COLL VENOUS BLD VENIPUNCTURE: CPT | Performed by: FAMILY MEDICINE

## 2022-11-01 PROCEDURE — 83036 HEMOGLOBIN GLYCOSYLATED A1C: CPT | Performed by: FAMILY MEDICINE

## 2022-11-01 RX ORDER — CYANOCOBALAMIN 1000 UG/ML
1000 INJECTION, SOLUTION INTRAMUSCULAR; SUBCUTANEOUS
Status: ACTIVE | OUTPATIENT
Start: 2022-11-01 | End: 2023-10-27

## 2022-11-01 RX ADMIN — CYANOCOBALAMIN 1000 MCG: 1000 INJECTION, SOLUTION INTRAMUSCULAR; SUBCUTANEOUS at 12:31

## 2022-11-01 NOTE — PROGRESS NOTES
"  Assessment & Plan     Type 2 diabetes mellitus without complication, without long-term current use of insulin (H)  Based on today's lab results patient now meets criteria for definition of diabetes and has indication for treatment.  I will recommend that she start metformin.  We will need to ensure adequate follow-up.    Morbid obesity (H)  Discussed weight management.  Patient would like to proceed with visits at a weight management clinic.  She may consider operative or nonoperative intervention.  - Hemoglobin A1c  - Vitamin B12  - Basic metabolic panel  (Ca, Cl, CO2, Creat, Gluc, K, Na, BUN)    Low vitamin B12 level  Level was drawn before B12 injection provided.  When results became available, it appears she is adequately repleted with vitamin B12.- cyanocobalamin injection 1,000 mcg    Chronic cough  Recommended chest x-ray.  Order was placed but patient could not stay for that today.  Will recommend that she proceed with that as she is able.  - XR Chest 2 Views     BMI:   Estimated body mass index is 43.09 kg/m  as calculated from the following:    Height as of 7/2/22: 1.676 m (5' 6\").    Weight as of this encounter: 121.1 kg (267 lb).   Weight management plan: Patient referred to endocrine and/or weight management specialty    No follow-ups on file.    Tj Campos MD  [facility]    Frank Linares is a 31 year old accompanied by her self, presenting for the following health issues:  Hospital F/U      [facility]   Hospital Follow-up Visit:    Hospital/Nursing Home/IP Rehab Facility: [facility]  Date of Admission: 07/02/22  Date of Discharge: 07/06/2022  Reason(s) for Admission: UTI    Was your hospitalization related to COVID-19? No   Problems taking medications regularly:  None  Medication changes since discharge: None  Problems adhering to non-medication therapy:  None    Summary of hospitalization:  [facility] discharge summary " "reviewed  Diagnostic Tests/Treatments reviewed.  Follow up needed: none  Other Healthcare Providers Involved in Patient s Care:         None  Update since discharge: improved.     Plan of care communicated with patient           Patient returns for \"follow-up,\" from hospitalization in July.  She had pyelonephritis.  Pansensitive E. coli.  She essentially was adequately treated when she left the hospital.  She needs to follow-up on diabetes and has not done so until this time.  A1c during that visit was elevated at 8.0.  She had not had a recheck after pregnancy during which she had gestational diabetes.  B12 was also noted to be low.  And she wanted to have that addressed.  She had been told she needed to get vitamin B12 shots at clinic.    She notes a cough for about 1 year.  Thought it started after a vacation in [place].  Never seem to get better.  Denies history or symptoms consistent with GERD, chronic sinusitis, or asthma.  Does note her cough gets worse when she drinks cold water.  Nothing seems to make it better.  Does think she coughs up mucus.    Patient has symptoms of IBS.  She has frequent loose stools.  4-5 times per day.  No abdominal pain.  She has taken Imodium but she becomes overly constipated with that medicine.      Objective    /86 (BP Location: Right arm, Patient Position: Sitting, Cuff Size: Adult Large)   Pulse 78   Resp 20   Wt 121.1 kg (267 lb)   LMP 10/31/2022   BMI 43.09 kg/m    Body mass index is 43.09 kg/m .  Physical Exam   GENERAL: alert, not distressed  CHEST: clear, no rales, rhonchi, or wheezes  CARDIAC: regular without murmur, gallop, or rub  ABDOMEN: soft, non tender, non distended, normal bowel sounds        Results for orders placed or performed in visit on 11/01/22   Hemoglobin A1c     Status: Abnormal   Result Value Ref Range    Hemoglobin A1C 8.3 (H) 0.0 - 5.6 %   Vitamin B12     Status: Normal   Result Value Ref Range    Vitamin B12 392 232 - 1,245 pg/mL   Basic " metabolic panel  (Ca, Cl, CO2, Creat, Gluc, K, Na, BUN)     Status: Abnormal   Result Value Ref Range    Sodium 139 136 - 145 mmol/L    Potassium 4.0 3.4 - 5.3 mmol/L    Chloride 104 98 - 107 mmol/L    Carbon Dioxide (CO2) 26 22 - 29 mmol/L    Anion Gap 9 7 - 15 mmol/L    Urea Nitrogen 8.3 6.0 - 20.0 mg/dL    Creatinine 0.70 0.51 - 0.95 mg/dL    Calcium 9.6 8.6 - 10.0 mg/dL    Glucose 132 (H) 70 - 99 mg/dL    GFR Estimate >90 >60 mL/min/1.73m2

## 2022-12-08 ENCOUNTER — OFFICE VISIT (OUTPATIENT)
Dept: ENDOCRINOLOGY | Facility: CLINIC | Age: 32
End: 2022-12-08
Payer: COMMERCIAL

## 2022-12-08 ENCOUNTER — LAB (OUTPATIENT)
Dept: LAB | Facility: CLINIC | Age: 32
End: 2022-12-08
Payer: COMMERCIAL

## 2022-12-08 VITALS
HEART RATE: 79 BPM | HEIGHT: 66 IN | BODY MASS INDEX: 43.76 KG/M2 | DIASTOLIC BLOOD PRESSURE: 93 MMHG | SYSTOLIC BLOOD PRESSURE: 137 MMHG | WEIGHT: 272.3 LBS | OXYGEN SATURATION: 99 %

## 2022-12-08 DIAGNOSIS — E66.01 CLASS 3 SEVERE OBESITY WITHOUT SERIOUS COMORBIDITY WITH BODY MASS INDEX (BMI) OF 40.0 TO 44.9 IN ADULT, UNSPECIFIED OBESITY TYPE (H): ICD-10-CM

## 2022-12-08 DIAGNOSIS — E11.9 TYPE 2 DIABETES MELLITUS WITHOUT COMPLICATION, WITHOUT LONG-TERM CURRENT USE OF INSULIN (H): ICD-10-CM

## 2022-12-08 DIAGNOSIS — E66.01 MORBID OBESITY (H): ICD-10-CM

## 2022-12-08 DIAGNOSIS — E66.813 CLASS 3 SEVERE OBESITY WITHOUT SERIOUS COMORBIDITY WITH BODY MASS INDEX (BMI) OF 40.0 TO 44.9 IN ADULT, UNSPECIFIED OBESITY TYPE (H): ICD-10-CM

## 2022-12-08 LAB
ALBUMIN SERPL BCG-MCNC: 4.2 G/DL (ref 3.5–5.2)
ALP SERPL-CCNC: 49 U/L (ref 35–104)
ALT SERPL W P-5'-P-CCNC: 39 U/L (ref 10–35)
ANION GAP SERPL CALCULATED.3IONS-SCNC: 10 MMOL/L (ref 7–15)
AST SERPL W P-5'-P-CCNC: 26 U/L (ref 10–35)
BILIRUB SERPL-MCNC: 0.8 MG/DL
BUN SERPL-MCNC: 7.4 MG/DL (ref 6–20)
CALCIUM SERPL-MCNC: 9.9 MG/DL (ref 8.6–10)
CHLORIDE SERPL-SCNC: 105 MMOL/L (ref 98–107)
CHOLEST SERPL-MCNC: 126 MG/DL
CREAT SERPL-MCNC: 0.61 MG/DL (ref 0.51–0.95)
DEPRECATED HCO3 PLAS-SCNC: 25 MMOL/L (ref 22–29)
ERYTHROCYTE [DISTWIDTH] IN BLOOD BY AUTOMATED COUNT: 12.2 % (ref 10–15)
GFR SERPL CREATININE-BSD FRML MDRD: >90 ML/MIN/1.73M2
GLUCOSE SERPL-MCNC: 200 MG/DL (ref 70–99)
HCT VFR BLD AUTO: 39 % (ref 35–47)
HDLC SERPL-MCNC: 40 MG/DL
HGB BLD-MCNC: 13.3 G/DL (ref 11.7–15.7)
LDLC SERPL CALC-MCNC: 40 MG/DL
MCH RBC QN AUTO: 28.3 PG (ref 26.5–33)
MCHC RBC AUTO-ENTMCNC: 34.1 G/DL (ref 31.5–36.5)
MCV RBC AUTO: 83 FL (ref 78–100)
NONHDLC SERPL-MCNC: 86 MG/DL
PLATELET # BLD AUTO: 230 10E3/UL (ref 150–450)
POTASSIUM SERPL-SCNC: 3.8 MMOL/L (ref 3.4–5.3)
PROT SERPL-MCNC: 7.3 G/DL (ref 6.4–8.3)
PTH-INTACT SERPL-MCNC: 31 PG/ML (ref 15–65)
RBC # BLD AUTO: 4.7 10E6/UL (ref 3.8–5.2)
SODIUM SERPL-SCNC: 140 MMOL/L (ref 136–145)
TRIGL SERPL-MCNC: 230 MG/DL
WBC # BLD AUTO: 7.5 10E3/UL (ref 4–11)

## 2022-12-08 PROCEDURE — 85027 COMPLETE CBC AUTOMATED: CPT | Performed by: PATHOLOGY

## 2022-12-08 PROCEDURE — 80061 LIPID PANEL: CPT | Performed by: PATHOLOGY

## 2022-12-08 PROCEDURE — 99204 OFFICE O/P NEW MOD 45 MIN: CPT

## 2022-12-08 PROCEDURE — 83970 ASSAY OF PARATHORMONE: CPT | Performed by: PATHOLOGY

## 2022-12-08 PROCEDURE — 36415 COLL VENOUS BLD VENIPUNCTURE: CPT | Performed by: PATHOLOGY

## 2022-12-08 PROCEDURE — 82306 VITAMIN D 25 HYDROXY: CPT

## 2022-12-08 PROCEDURE — 80053 COMPREHEN METABOLIC PANEL: CPT | Performed by: PATHOLOGY

## 2022-12-08 RX ORDER — TIRZEPATIDE 2.5 MG/.5ML
2.5 INJECTION, SOLUTION SUBCUTANEOUS
Qty: 2 ML | Refills: 1 | Status: SHIPPED | OUTPATIENT
Start: 2022-12-08 | End: 2023-08-03

## 2022-12-08 ASSESSMENT — PAIN SCALES - GENERAL: PAINLEVEL: NO PAIN (0)

## 2022-12-08 NOTE — PATIENT INSTRUCTIONS
"Thank you for allowing us the privilege of caring for you. We hope we provided you with the excellent service you deserve.   Please let us know if there is anything else we can do for you so that we can be sure you are completely satisfied with your care experience.    To ensure the quality of our services you may be receiving a patient satisfaction survey from an independent patient satisfaction monitoring company.    The greatest compliment you can give is a \"Likely to Recommend\"    Your visit was with ABNER IVLLEGAS PA-C today.    Instructions per today's visit:     Aj Betancur, it was great to visit with you today.  Here is a review of our visit.  If our clinic scheduler is not able to reach you please call [phone] to schedule your next appointments.    Labs ordered today   Start Mounjaro 2.5mg every 7 days. If not covered by insurance, will see if Ozempic is covered.   Follow up with Abner Rashid in 1 month       Information about Video Visits with [employer]: video visit information  _________________________________________________________________________________________________________________________________________________________  If you are asked by your clinic team to have your blood pressure checked:  [facility] do offer several locations for blood pressure checks. Please follow the below link to schedule an appointment. Scheduling an appointment at the pharmacy for a blood pressure check is now preferred.    Appointment Plus (appointment-[email or web address])  _________________________________________________________________________________________________________________________________________________________  Important contact and scheduling information:  Please call our contact center at [phone] to schedule your next appointments.  To find a lab location near you, please call [phone].  For any nursing questions or concerns call Tabatha Burns LPN at [phone] or Chelsea Escobar RN at " [phone]  Please call during clinic hours Monday through Friday 8:00a - 4:00p if you have questions or you can contact us via [employer] at anytime and we will reply during clinic hours.    Lab results will be communicated through My Chart or letter (if My Chart not used). Please call the clinic if you have not received communication after 1 week or if you have any questions.?  Clinic Fax: [phone]    _________________________________________________________________________________________________________________________________________________________  Meal Replacement Products:    Here is the link to our new e-store where you can purchase our meal replacement products    [employer] E-Store  PinBridge/store    The one week starter kit is a great way to sample a variety of products and see what works for you.    If you want more information about the product go to: [email or web address]Joongel    If you are an employee or [facility] or [employer] please contact your care team for a 10% estore discount    Free Shipping for orders over $75     Benefits of meal replacements products:    Portion and calorie control  Improved nutrition  Structured eating  Simplified food choices  Avoid contact with trigger foods  _________________________________________________________________________________________________________________________________________________________  Interested in working with a health ?  Health coaches work with you to improve your overall health and wellbeing.  They look at the whole person, and may involve discussion of different areas of life, including, but not limited to the four pillars of health (sleep, exercise, nutrition, and stress management). Discuss with your care team if you would like to start working a health .  Health Coaching-3 Pack: Schedule by calling [phone]    $99 for three health coaching visits    Visits may  be done in person or via phone    Coaching is a partnership between the  and the client; Coaches do not prescribe or diagnose    Coaching helps inspire the client to reach his/her personal goals   _________________________________________________________________________________________________________________________________________________________  24 Week Healthy Lifestyle Plan:    Our mission in the 24-week Healthy Lifestyle Plan is to provide you with individualized care by giving you the tools, education and support you need to lose weight and maintain a healthy lifestyle. In your 24-week journey, you ll be supported by a dedicated weight loss team that includes registered dietitians, medical weight management providers, health coaches, and nurses -- all with special expertise in weight loss -- to help you every step of the way.     Monthly meetings with your registered dietician or medical weight management provider help to review your progress, update your care plan, and make any adjustments needed to ensure success. Between these visits, weekly and bi-weekly health  visits will help you focus on the four pillars of weight loss -- stress, sleep, nutrition, and exercise -- and how you can best adapt each to achieve sustainable weight loss results.    In addition, you will be given exclusive access to online wellbeing classes through [employer].  Your initial visit will be with a medical weight management provider who will help to understand your weight loss goals and ensure this program is the right fit for you. Please let our team know if you are interested in the 24 week plan by sending a message to your care team or calling [phone] to schedule.  _________________________________________________________________________________________________________________________________________________________    COMPREHENSIVE WEIGHT MANAGEMENT PROGRAM  VIRTUAL SUPPORT GROUPS    For Support Group  "Information:      We offer support groups for patients who are working on weight loss and considering, preparing for or have had weight loss surgery.   There is no cost for this opportunity.  You are invited to attend the?Virtual Support Groups?provided by any of the following locations:    [facility] via Microsoft Teams with Angeles Elizondo RN  2.   [place] via [facility] with Jaycob Mclaughlin, PhD, LP  3.   [place] via [facility] with Jenn Andrews RN  4.   [facility] via [employer] Teams with Jenn Haque [facility]    The following Support Group information can also be found on our website:  [email or web address]    [facility] Weight Loss Surgery Support Group    [facility] Weight Loss Surgery Support Group  The support group is a patient-lead forum that meets monthly to share experiences, encouragement and education. It is open to those who have had weight loss surgery, are scheduled for surgery, and those who are considering surgery.   WHEN: This group meets on the 3rd Wednesday of each month from 5:00PM - 6:00PM virtually using Microsoft Teams.   FACILITATOR: Led by Angeles Elizondo RD, LD, RN, the program's Clinical Coordinator.   TO REGISTER: Please contact the clinic via [employer] or call the nurse line directly at [phone] to inform our staff that you would like an invite sent to you and to let us know the email you would like the invite sent to. Prior to the meeting, a link with directions on how to join the meeting will be sent to you.    2022 Meetings  Oralia 15: \"Let's Talk\" a time for the group to share.  July 20: \"Let's Talk\" a time for the group to share.  August 17: \"Let's Talk\" a time for the group to share.  September 21: \"Let's Talk\" a time for the group to share.  October 19: Guest Speaker: Dr Kurt Mckeon MD Pulmonologist and Sleep Medicine Physician, \"Getting a Good Night's Sleep\".  November 16: \"Let's " "Talk\" a time for the group to share.  December 21: \"Let's Talk\" a time for the group to share.    [facility] Support Groups    Connections: Bariatric Care Support Group?  This is open to all [facility] (and those external to this program) pre- and post- operative bariatric surgery patients as well as their support system.   WHEN: This group meets the 2nd Tuesday of each month from 6:30 PM - 8:00 PM virtually using Microsoft Teams.   FACILITATOR: Led by Jaycob Mclaughlin, Ph.D who is a Licensed Psychologist with the [facility] Comprehensive Weight Management Program.   TO REGISTER: Please send an email to Jaycob Mclaughlin, Ph.D., LP at?[email or web address]?if you would like an invitation to the group and to learn about using Microsoft Teams.    2022 Meetings  June 14: Mery Donato RD, LD at [facility], \"Nutritional Labeling\"  July 12 August 2 (Please Note Date Change)  September 13 October 11 November 8 December 13    Connections: Post-Operative Bariatric Surgery Support Group  This is a support group for [facility] bariatric patients (and those external to [facility]) who have had bariatric surgery and are at least 3 months post-surgery.  WHEN: This support group meets the 4th Wednesday of the month from 11:00 AM - 12:00 PM virtually using Microsoft Teams.   FACILITATOR: Led by Certified Bariatric Nurse, Jenn Andrews RN.   TO REGISTER: Please send an email to Jenn at [email or web address] if you would like an invitation to the group and to learn about using Microsoft Teams.    2022 Meetings June 22 July 27 August 24 September 28 October 26 November 23 December 28      [facility] Healthy Lifestyle Virtual Support Group    Healthy Lifestyle Virtual Support Group?  This is 60 minutes of small group guided discussion, support and resources. All are welcome who want a healthy " "lifestyle.  WHEN: This group meets monthly on a Friday from 12:30 PM - 1:30 PM virtually using Microsoft Teams.   FACILITATOR: Led by National Board Certified Health and , Jenn Haque [facility].   TO REGISTER: Please send an email to Jenn at?[email or web address] to receive monthly invites to the group or if you have any questions about having a health .  Prior to the meeting, a link with directions on how to join the meeting will be sent to you.    2022 Meetings  June 24: Jenn Haque [facility], \"Setting Limits and Boundaries\".  Jul 29: Open Forum  August 26: Guest Speaker: Marcia Fuentes Registered Dietitian  September 30: Open Forum  October 28th: Guest Speaker: Alyse Roberto [facility], Health , \"Gratitude Practices\".  November 18: Guest Speaker: Mary Wright RD Registered Dietitian, \"Navigating How to Eat around the Holidays\".  December 16: Guest Speaker: Massiel Chung [facility], \"Changing Your Relationship with Movement\".    ____________________________________________________________________________________________________________________________________________________________________________  [place] [facility] Get Moving Program  Our team of physical therapists is trained to help you understand and take control of your condition. They will perform a thorough evaluation to determine your ability for activity and develop a customized plan to fit your goals and physical ability.  Scheduling: Unsure if the Get Moving program is right for you? Discuss the program with your medical provider or diabetes educator. You can also call us at [phone] to ask questions or schedule an appointment.   [facility] Get Moving Program  ____________________________________________________________________________________________________________________________________________________________________________  [facility] Diabetes Prevention Program (DPP)  If you have prediabetes and Medicare " please contact us via [employer] to learn more about the Diabetes Prevention Program (DPP)  Program Details:  [facility] offers the year-long Diabetes Prevention Program (DPP). The program helps you to make lifestyle changes that prevent or delay type 2 diabetes by supporting healthy eating, increased physical activity, stress reduction and use of coping skills.   On average, previous [facility] DPP cohorts have lost and maintained at least 5% of their starting weight throughout the program and averaged more than 150 minutes of physical activity per week.  Participants meet weekly for one-hour group sessions over sixteen weeks, every other week for the next 8 weeks, and monthly for the last six months.   A year-long maintenance program is also available for participants who complete the first year.   Location & Cost:   During the COVID-19 Public Health Emergency, the program is offered virtually. When in-person classes can resume, they will be held at [facility].  For people with Medicare, the program is covered in full. A self-pay option will also be available for those with non-Medicare insurance plans.   _________________________________________________________________________________________________________________________________________________________  Bluetooth Scale:    We hope to provide you with high quality virtual healthcare visits while social distancing for COVID-19 is necessary, as well as in the future when virtual visits may be more convenient for you.     Our technology team made it possible for Bluetooth scales to send weight measurements to our electronic medical record. This allows weights from you weighing at home to securely flow into the medical record, which will improve telephone and virtual visits.   Additionally, studies have shown that adults actually lose more weight when their weights are automatically sent to someone else, and also that  this process is not stressful for those adults.    Below is a link for purchasing the scale, with a discount code for our patients. You may call your insurance company to see if they will reimburse you for the cost of the scale, as a piece of durable medical equipment. The scales only go up to a weight of 400 pounds. This is an issue and we are working with the developer on increasing this. We found no scales that go over 400lb that have blue-tooth for connecting to [employer].    Scale to purchase: the Belly Ballot  Body  Scale: https://www.[employer]/us/en/body/shop?[email or web address]=aw.ds    Discount Code: We have a discount code for our patients to bring the cost down to $50, Discount code is: [place]_Scale_20%off  _______________________________________________________________________________________________________________________________________________________________________________    To work with a Behavioral Health Psychologist:    Call to schedule:    Tobi Dennison - [phone]  Marlo Piedra - [phone]  Cindy Mathis - [phone]  Zita Dupont - [phone]   Brunidla Kincaid PhD (cannot accept Medicare) [phone]        Thank you,   [facility] Comprehensive Weight Management Team      Mounjaro (Tirzepatide)    We are considering starting Moujaro (Tirazepatide), a once weekly injection. This medication as of now is only FDA approved for Type 2 Diabetes. It is to be used as an adjunct to healthy nutrition and exercise to help improve people's blood sugars and help people lose weight.  Mounjaro activates the body's receptors for GIP (glucose-dependent insulinotropic polypeptide) and GLP-1 (glucagon-like peptide-1) receptor, which are natural incretin hormones which helps with lowering blood sugars from the foods you eat and help with weight loss. It also works is by slowing down the rate that food leaves your stomach. You feel jackson  and will eat less.  Starting this medication will depend on insurance coverage and likely will require a Prior Authorization through your insurance -  this may take up to 1-2 weeks for an insurance company to make a decision if they will cover the medication.     Dosing: Mounjaro is available in six doses (2.5 mg, 5 mg, 7.5 mg, 10 mg, 12.5 mg, 15 mg) - your starting dose will be discussed with you and your provider, but usually starting Moujaro dosing includes: Inject 2.5 mg subcutaneously weekly for 4 weeks then 5 mg weekly thereafter.     How to Inject:   For Video: https://www.[employer]ch[employer]/how-to-use-mounjaro    For Instructional Sheet: https://uspl.[employer].com/mounjaro/mounjaro.html#ug0     Storage: Store Mounajro in its original packaging box in refrigerator. Mounjaro is good until the expiration date on the box under refrigeration. Mounjaro is good at room temperature for 21 days.     Side effects: The most common side effects are nausea, diarrhea, decreased appetite, vomiting, constipation, indigestion (dyspepsia), and stomach pain. Patients are advised to eat more slowly and purposefully give yourself less portions than your typical as you may find after starting this medication you have a new point of fullness. It is also important to stay adequately hydrated on the medication to reduce risks of some of these side effects. Many of these side effects (nausea, diarrhea, etc) occurred during dose escalation but did improve over time with continuing the medication. If side effects are unmanageable, reach out to your prescribing provider.     The risk of pancreatitis (inflammation of the pancreas) has been associated with this type of medication, but is very rare.  If you have had pancreatitis in the past, this medication may not be for you. Please let us know about any past history of pancreas problems. If you experience persistent severe abdominal pain (sometimes radiating to the back potentially accompanied by  vomiting and have a fever), stop the medication and contact your provider immediately for assessment. They will do a blood test to check for pancreatitis.       Women of child-bearing age on oral contraceptive should use an additional form of birth control (condom, spermicidal lubrication, etc) when starting Mounjaro and during dose escalation due to delayed gastric emptying (may affect efficacy/blood levels of oral contraceptives).    If on short acting insulins or oral diabetes agents, sometimes these are stopped or decreased by the doctor at the appointment. Low blood sugars are rare but can happen. If you notice consistent low sugars or high sugars, your medication may need to be adjusted after your appointment. If this is the case, please call the number below to speak to a nurse and provide her your blood sugar record from the last 3-4 days. The nurse will get in touch with the doctor and call you back/[employer] message with recommendations. We tolerate higher sugars for a bit, so if sugars are running 180-200, this is ok. As weight starts dropping the blood sugars should too. If readings are consistently over 200 for 1-2 weeks, then you should call the doctor/nurse.    There is a small chance you may have some low blood sugar after taking the medication.   The signs of low blood sugar are:  Weakness  Shaky   Hungry  Sweating  Confusion    Treating Low Blood Sugar    If you have symptoms of low blood sugar (sweating, shaking, dizzy, confused) eat 15 grams of quick carbohydrates and wait 15 minutes. You need less than you think to raise your blood sugar - it is important not to overcorrect which can cause a subsequent high blood sugar.     Examples of what equals 15 grams of carbohydrates:   Glucose tablets (see instructions, but you will typically need to take 3-4 of these to equal 15 grams)  4 ounces (1/2 cup) of juice or regular soda (not diet)   1 tablespoon of sugar or honey  Hard candies, etc (see food  "label for how many to consume)    If you have a way of checking your blood sugar - follow these instructions. Blood sugars < 70 mg/dL are considered to be low, called hypoglycemia.     If you every feel symptoms of low blood sugar or have blood sugar <70 mg/dL, test your blood sugar and treat using the \"15-15 rule\".     The \"15-15 rule\" is:   Correct your low blood sugar by eating or drinking 15 grams of \"quick sugar\" carbohydrates. Any of the below are considered the proper amount of simple carbohydrates:   1/2 cup (4 oz) juice  Glucose tablets (typically 3-4 will equal 15 grams)   1 tablespoon honey or sugar   A couple hard candies   Then test your blood sugar again in 15 minutes and check to see that the blood sugar has gotten >70 mg/dL.  If it has not, repeat the 15-15 rule.   Once you have achieved blood sugar >70 mg/dL, eat something small that contains a complex carbohydrate + fat/protein, such as 1 slice of bread + 1 tablespoon peanut butter or a couple crackers and 1 tablespoon peanut butter or slice of cheese.     For any questions or concerns please send a [employer] message to our team or call our weight management call center at [phone] during regular business hours. For questions during evenings or weekends your messages will be addressed during the next business day.  For emergencies please call 911 or seek immediate medical care.     In order to get refills of this or any medication we prescribe you must be seen in the medical weight mgmt clinic every 2-4 months. Please have your pharmacy fax a refill request to [phone].                          "

## 2022-12-08 NOTE — LETTER
"2022       RE: Vjiay Betancur  [address] [place] [address]     Dear Colleague,    Thank you for referring your patient, Vijay Betancur, to the [facility]. Please see a copy of my visit note below.    56 minutes spent on the date of the encounter doing chart review, history and exam, documentation and further activities per the note    New Medical Weight Management Consult    PATIENT:  Vijay Betancur  MRN:         [id number]  :         1990  JAMEE:         2022    Dear Dr. Tj Campos,    I had the pleasure of seeing your patient, Vijay Betancur. Full intake/assessment was done to determine barriers to weight loss success and develop a treatment plan. Vijay Betancur is a 32 year old female interested in treatment of medical problems associated with excess weight. She has a height of 5' 6\", a weight of 272 lbs 4.8 oz, and the calculated Body mass index is 43.95 kg/m .        Assessment & Plan   Problem List Items Addressed This Visit        Digestive    Class 3 severe obesity without serious comorbidity with body mass index (BMI) of 40.0 to 44.9 in adult (H)     Obesity onset in middle school. Increase weight gain through 6 pregnancies. Has been able to lose weight in past, but struggles to sustain weight loss. Most concerned with new diagnosis of Type II diabetes and fatty liver. Wants to be healthier prior to starting for another pregnancy.     AOMs discussed today:   - Phentermine - can be considered in future.   - Topiramate - concern for brain fog   - Naltrexone- can be considered in future.   - GLP-1 to be started today for further control of type II diabetes and weight loss. Most recent A1C was 8.3. Discussed side effect and benefits. History of diarrhea, Imodium for relief that, leads to constipation. Will monitor with medication start. Will start Mounjaro 2.5mg weekly. If not covered will consider ozempic. All " "questions answered.              Relevant Medications    Tirzepatide (MOUNJARO) 2.5 MG/0.5ML SOPN    Other Relevant Orders    CBC with platelets (Completed)    Comprehensive metabolic panel (Completed)    Lipid panel reflex to direct LDL Fasting (Completed)    Parathyroid Hormone Intact (Completed)    Vitamin D Deficiency       Endocrine    Type 2 diabetes mellitus without complication, without long-term current use of insulin (H)    Relevant Medications    Tirzepatide (MOUNJARO) 2.5 MG/0.5ML SOPN      1. Labs ordered  2. Start Mounjaro 2.5mg weekly. If tolerating well, will increase to 5mg at next visit. Consider Ozempic is not covered.   3. Follow up with Arabella Rashid in 1 month             Mao Her is a 32 year old female who presents to clinic today. She has the following co-morbidities:       12/8/2022   I have the following health issues associated with obesity: Type II Diabetes, Fatty Liver, Stress Incontinence   I have the following symptoms associated with obesity: Knee Pain, Back Pain     Type II diabetes - Recent diagnosis. Started metformin. Has been checking blood sugar 4xday after eating. Averages 180-210. A1C 8.3 11/1/2022    Fatty liver - diagnosised this summer while hospitalized.     STAR - tried CPAP for 3 months, but  didn't like the noise. So returned it .       Patient Goals 12/8/2022   I am interested in having a healthier weight to diminish current health problems: Yes   I am interested in having a healthier weight in order to prevent future health problems: Yes   I am interested in having a healthier weight in order to have a future surgery: Yes       Referring Provider 12/8/2022   Please name the provider who referred you to Medical Weight Management.  If you do not know, please answer: \"I Don't Know\". Tj Blancoanna       Weight History 12/8/2022   Would you describe your weight gain as gradual? Yes   I became overweight: As a Child   The following factors have contributed to my weight gain: " " Eating Wrong Types of Food, Eating Too Much, Lack of Exercise, Genetic (Runs in the Family), Stress   I have tried the following methods to lose weight: Fasting   My lowest weight since age 19 was: 217   My highest weight since age 19 was: 293   The most weight I have ever lost was: (lbs) 70   I have the following family history of obesity/being overweight:  I am the only one in my immediate family who is overweight, My father is overweight, One or more of my siblings are overweight   Has anyone in your family had weight loss surgery? No   How has your weight changed over the last year?  Gained   How many pounds? 70     Obesity onset in childhood. Weight gain has been gradual. Has had 6 pregnancies. Just had a baby in March 2022. No longer breast feeding or pumping. Did a \"biggest loser\" and lost 60lbs, but regained weight through pandemic. Struggles to sustain weight loss. Wants to get healthier before starting to get pregnant again.     Highest weight in life - 293lb  Weight today - 272    Eating 3 meals a day when not working. Fasting all day when at work, and then will come home and will eat all night. Increased hunger. Struggles to get full. Craves pastries. Will eat out 2xweek. She does all the grocery shopping and cooking.    For all meals - rice + pork or chicken + vegetables   Snacks - chips, cookies, whatever is in house   Drinks - water, apple juice, regular soda (3xweek), coffee   ETOH - never      Activity - very active at home with house work and children.     Diet Recall Review with Patient 12/8/2022   Do you typically eat breakfast? No   Do you typically eat lunch? Yes   Do you typically eat supper? Yes   Do you typically eat snacks? Yes   Do you like vegetables?  Yes   Do you drink water? Yes   If you do drink milk, what type? Skim   How often do you have a drink of alcohol? Never       Eating Habits 12/8/2022   Generally, my meals include foods like these: bread, pasta, rice, potatoes, corn, " crackers, sweet dessert, pop, or juice. Everyday   Generally, my meals include foods like these: fried meats, brats, burgers, french fries, pizza, cheese, chips, or ice cream. Everyday   Eat fast food (like McDonalds, Burger Mehran, Taco Bell). A Few Times a Week   Eat at a buffet or sit-down restaurant. Once a Week   Eat most of my meals in front of the TV or computer. Less Than Weekly   Often skip meals, eat at random times, have no regular eating times. Everyday   Rarely sit down for a meal but snack or graze throughout.  Everyday   Eat extra snacks between meals. Almost Everyday   Eat most of my food at the end of the day. Almost Everyday   Eat in the middle of the night or wake up at night to eat. A Few Times a Week   Eat to prevent acid reflux or stomach pain. Never   Worry about not having enough food to eat. Never   Have you been to the food shelf at least a few times this year? No   I eat when I am depressed. Almost Everyday   I eat when I am stressed. Almost Everyday   I eat when I am bored. Almost Everyday   I eat when I am happy or as a reward. Almost Everyday   I feel hungry all the time even if I just have eaten. A Few Times a Week   Feeling full is important to me. Almost Everyday   I finish all the food on my plate even if I am already full. Everyday   I can't resist eating delicious food or walk past the good food/smell. Everyday   I eat/snack without noticing that I am eating. Almost Everyday   I eat when I am preparing the meal. Almost Everyday   I eat more than usual when I see others eating. Almost Everyday   I have trouble not eating sweets, ice cream, cookies, or chips if they are around the house. Almost Everyday   I think about food all day. A Few Times a Week   What foods, if any, do you crave? Sweets/Candy/Chocolate   Please list any other foods you crave? Rice with every meals       Amount of Food 12/8/2022   I make myself vomit what I have eaten or use laxatives to get rid of food. Weekly  "  I eat a large amount of food, like a loaf of bread, a box of cookies, a pint/quart of ice cream, all at once. Weekly   I eat a large amount of food even when I am not hungry. Weekly   I eat rapidly. Almost Everyday   I eat alone because I feel embarrassed and do not want others to see how much I have eaten. Almost Everyday   I eat until I am uncomfortably full. Everyday   I feel bad, disgusted, or guilty after I overeat. Everyday   I make myself vomit what I have eaten or use laxatives to get rid of food. Weekly       Activity/Exercise History 2022   How much of a typical 12 hour day do you spend sitting? Half the Day   How much of a typical 12 hour day do you spend lying down? Less Than Half the Day   How much of a typical day do you spend walking/standing? Less Than Half the Day   How many hours (not including work) do you spend on the TV/Video Games/Computer/Tablet/Phone? 4-5 Hours   How many times a week are you active for the purpose of exercise? Once a Week   What keeps you from being more active? Lack of Time, Too tired   How many total minutes do you spend doing some activity for the purpose of exercising when you exercise? 15-30 Minutes       PAST MEDICAL HISTORY:  Past Medical History:   Diagnosis Date     Acute cholecystitis 10/02/2020     Gestational diabetes mellitus (GDM) 10/12/2016    insulin dependant for over 4 mos     History of gestational diabetes in prior pregnancy, currently pregnant in second trimester 2021     Menarche 18 y/o    Menarche 18 y/o, menses qmo, bleeding x7 days (heavier since birth of 2nd child)      (normal spontaneous vaginal delivery) 10/30/2015    - 10/31/15:  40w6d Lucinda's/Loredo, labor 2h57m (push 8m, CHRIS), no pain meds, no lac, EBL 175mL (recd pitocin 10mU IM). GBS neg. Female BW 7#14.3 oz, L 20\", HC 13.5\", apgars 9/9. formula-feeding          (normal spontaneous vaginal delivery) 2016     STAR (obstructive sleep apnea)     does not wear " cpap, tried using it but was not using it enough-reports no cpap at home 3/2/22     Postpartum depression     possible postpartum depression, mild     Supervision of high risk pregnancy in second trimester 3/19/2015     per Dr Loredo: FINAL EDC 10/25/15 by 19-5 u/s at [facility] on 6/5/15 c/w  UNsure LMP mid 2015 Obesity, BMI >40   7/8/15 24-3: 1 hr , HgbA1C 5.1 Replacement Utility updated for latest IMO load      UTI in pregnancy 2014    - 14 prenatal 36+wk GA: UCx >100k eColi pan-sensitive - 6/10/15 prenatal 20-3 (walk in clinic): UCx >100k eColi (R amp/tetracycline/TMP-SMX), treated with nitrofurantoin - 2015 prenatal 24w3d: sxs resolved, Ucx NEG  - 2015 MyChart message: c/o strong odor back again, advised NTBS and check urine culture and if again has UTI then will need to keep her on prophylactic antibiotics for dur       Work/Social History Reviewed With Patient 2022   My employment status is: Part-Time, Student   My job is: Certified nursinf assistant   How much of your job is spent on the computer or phone? Less Than 50%   What is your marital status? /In a Relationship   If in a relationship, is your significant other overweight? No   Do you have children? Yes   If you have children, are they overweight? Yes   Who do you live with?  Spouse and children   Are they supportive of your health goals? No   Who does the food shopping?  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 Very supportive .     Mental Health History Reviewed With Patient 2022   Have you ever been physically or sexually abused? No   If yes, do you feel that the abuse is affecting your weight? N/A   If yes, would you like to talk to a counselor about the abuse? N/A   How often in the past 2 weeks have you felt little interest or pleasure in doing things? For Several Days   Over the past 2 weeks how often have you felt down, depressed, or hopeless? For Several Days  "  Mom recently past away. Wants to start therapy, but feeling overwhelmed right now. Declines therapy at this time.     Sleep History Reviewed With Patient 12/8/2022   How many hours do you sleep at night? 8   Do you think that you snore loudly or has anybody ever heard you snore loudly (louder than talking or so loud it can be heard behind a shut door)? Yes   Has anyone seen or heard you stop breathing during your sleep? Yes   Do you often feel tired, fatigued, or sleepy during the day? Yes   Do you have a TV/Computer in your bedroom? Yes       MEDICATIONS:   Current Outpatient Medications   Medication Sig Dispense Refill     ACCU-CHEK GUIDE test strip USE TO TEST BLOOD SUGAR 4 TIMES DAILY OR AS DIRECTED./ TXHUA HNUB NTSUAS NTSHAV QAB ZIB 4 ZAUG LI TUS SHIELA MOB QHIA       Alcohol Swabs (ALCOHOL PREP) 70 % PADS USE 1 EACH 4 TIMES DAILY/ TXHUA HNUB SIV 4 ZAUG       blood glucose monitoring (SOFTCLIX) lancets USE TO TEST BLOOD SUGAR 4 TIMES DAILY OR AS DIRECTED./ TXHUA HNUB NTSUAS NTSHAV QAB ZIB 4 ZAUG LI TUS SHIELA MOB QHIA       Blood Glucose Monitoring Suppl (ACCU-CHEK GUIDE ME) w/Device KIT USE 1 EACH ONCE FOR 1 DOSE       etonogestrel (NEXPLANON) 68 MG IMPL 1 each (68 mg) by Subdermal route continuous       metFORMIN (GLUCOPHAGE) 500 MG tablet Take 1 tablet (500 mg) by mouth 2 times daily (with meals) for 180 days 180 tablet 1     Tirzepatide (MOUNJARO) 2.5 MG/0.5ML SOPN Inject 2.5 mg Subcutaneous every 7 days 2 mL 1       ALLERGIES:   No Known Allergies          Objective     BP (!) 137/93 (BP Location: Left arm, Patient Position: Sitting, Cuff Size: Adult Large)   Pulse 79   Ht 1.676 m (5' 6\")   Wt 123.5 kg (272 lb 4.8 oz)   LMP 10/31/2022   SpO2 99%   BMI 43.95 kg/m    Physical Exam   GENERAL: Healthy, alert and no distress  EYES: Eyes grossly normal to inspection.  No discharge or erythema, or obvious scleral/conjunctival abnormalities.  RESP: No audible wheeze, cough, or visible cyanosis.  No visible " retractions or increased work of breathing.    SKIN: Visible skin clear. No significant rash, abnormal pigmentation or lesions.  NEURO: Cranial nerves grossly intact.  Mentation and speech appropriate for age.  PSYCH: Mentation appears normal, affect normal/bright, judgement and insight intact, normal speech and appearance well-groomed.     Anti-obesity medication ROS:    HEENT  Hx of glaucoma: No    Cardiovascular  CAD:No  HTN:No    Gastrointestinal  GERD:No  Constipation:No   Diarrhea: Yes, takes imodium. But then will make her constipated.   Liver Dz:Yes  H/O Pancreatitis:No    Psychiatric  Bipolar: No  Anxiety:No  Depression:No  History of alcohol/drug abuse: No  Hx of eating disorder:No    Endocrine  Personal or family hx of MTC or MEN2:No  Diabetes/prediabetes: Yes    Neurologic:  Hx of seizures: No  Hx of migraines: No  Memory Impairment: No      History of kidney stones: No  Kidney disease: No  Current birth control: Nexplanon      Sincerely,    ABNER VILLEGAS PA-C

## 2022-12-08 NOTE — PROGRESS NOTES
"56 minutes spent on the date of the encounter doing chart review, history and exam, documentation and further activities per the note    New Medical Weight Management Consult    PATIENT:  Vijay Betancur  MRN:         [id number]  :         1990  JAMEE:         2022    Dear Dr. Tj Campos,    I had the pleasure of seeing your patient, Vijay Betancur. Full intake/assessment was done to determine barriers to weight loss success and develop a treatment plan. Vijay Betancur is a 32 year old female interested in treatment of medical problems associated with excess weight. She has a height of 5' 6\", a weight of 272 lbs 4.8 oz, and the calculated Body mass index is 43.95 kg/m .        Assessment & Plan   Problem List Items Addressed This Visit        Digestive    Class 3 severe obesity without serious comorbidity with body mass index (BMI) of 40.0 to 44.9 in adult (H)     Obesity onset in middle school. Increase weight gain through 6 pregnancies. Has been able to lose weight in past, but struggles to sustain weight loss. Most concerned with new diagnosis of Type II diabetes and fatty liver. Wants to be healthier prior to starting for another pregnancy.     AOMs discussed today:   - Phentermine - can be considered in future.   - Topiramate - concern for brain fog   - Naltrexone- can be considered in future.   - GLP-1 to be started today for further control of type II diabetes and weight loss. Most recent A1C was 8.3. Discussed side effect and benefits. History of diarrhea, Imodium for relief that, leads to constipation. Will monitor with medication start. Will start Mounjaro 2.5mg weekly. If not covered will consider ozempic. All questions answered.              Relevant Medications    Tirzepatide (MOUNJARO) 2.5 MG/0.5ML SOPN    Other Relevant Orders    CBC with platelets (Completed)    Comprehensive metabolic panel (Completed)    Lipid panel reflex to direct LDL Fasting (Completed)    Parathyroid Hormone Intact (Completed)    " "Vitamin D Deficiency       Endocrine    Type 2 diabetes mellitus without complication, without long-term current use of insulin (H)    Relevant Medications    Tirzepatide (MOUNJARO) 2.5 MG/0.5ML SOPN      1. Labs ordered  2. Start Mounjaro 2.5mg weekly. If tolerating well, will increase to 5mg at next visit. Consider Ozempic is not covered.   3. Follow up with Arabella Rashid in 1 month             Mao Her is a 32 year old female who presents to clinic today. She has the following co-morbidities:       12/8/2022   I have the following health issues associated with obesity: Type II Diabetes, Fatty Liver, Stress Incontinence   I have the following symptoms associated with obesity: Knee Pain, Back Pain     Type II diabetes - Recent diagnosis. Started metformin. Has been checking blood sugar 4xday after eating. Averages 180-210. A1C 8.3 11/1/2022    Fatty liver - diagnosised this summer while hospitalized.     STAR - tried CPAP for 3 months, but  didn't like the noise. So returned it .       Patient Goals 12/8/2022   I am interested in having a healthier weight to diminish current health problems: Yes   I am interested in having a healthier weight in order to prevent future health problems: Yes   I am interested in having a healthier weight in order to have a future surgery: Yes       Referring Provider 12/8/2022   Please name the provider who referred you to Medical Weight Management.  If you do not know, please answer: \"I Don't Know\". Tj Butler       Weight History 12/8/2022   Would you describe your weight gain as gradual? Yes   I became overweight: As a Child   The following factors have contributed to my weight gain:  Eating Wrong Types of Food, Eating Too Much, Lack of Exercise, Genetic (Runs in the Family), Stress   I have tried the following methods to lose weight: Fasting   My lowest weight since age 19 was: 217   My highest weight since age 19 was: 293   The most weight I have ever lost was: (lbs) 70   I have " "the following family history of obesity/being overweight:  I am the only one in my immediate family who is overweight, My father is overweight, One or more of my siblings are overweight   Has anyone in your family had weight loss surgery? No   How has your weight changed over the last year?  Gained   How many pounds? 70     Obesity onset in childhood. Weight gain has been gradual. Has had 6 pregnancies. Just had a baby in March 2022. No longer breast feeding or pumping. Did a \"biggest loser\" and lost 60lbs, but regained weight through pandemic. Struggles to sustain weight loss. Wants to get healthier before starting to get pregnant again.     Highest weight in life - 293lb  Weight today - 272    Eating 3 meals a day when not working. Fasting all day when at work, and then will come home and will eat all night. Increased hunger. Struggles to get full. Craves pastries. Will eat out 2xweek. She does all the grocery shopping and cooking.    For all meals - rice + pork or chicken + vegetables   Snacks - chips, cookies, whatever is in house   Drinks - water, apple juice, regular soda (3xweek), coffee   ETOH - never      Activity - very active at home with house work and children.     Diet Recall Review with Patient 12/8/2022   Do you typically eat breakfast? No   Do you typically eat lunch? Yes   Do you typically eat supper? Yes   Do you typically eat snacks? Yes   Do you like vegetables?  Yes   Do you drink water? Yes   If you do drink milk, what type? Skim   How often do you have a drink of alcohol? Never       Eating Habits 12/8/2022   Generally, my meals include foods like these: bread, pasta, rice, potatoes, corn, crackers, sweet dessert, pop, or juice. Everyday   Generally, my meals include foods like these: fried meats, brats, burgers, french fries, pizza, cheese, chips, or ice cream. Everyday   Eat fast food (like [employer], [employer], Taco Bell). A Few Times a Week   Eat at a buffet or sit-down restaurant. Once " a Week   Eat most of my meals in front of the TV or computer. Less Than Weekly   Often skip meals, eat at random times, have no regular eating times. Everyday   Rarely sit down for a meal but snack or graze throughout.  Everyday   Eat extra snacks between meals. Almost Everyday   Eat most of my food at the end of the day. Almost Everyday   Eat in the middle of the night or wake up at night to eat. A Few Times a Week   Eat to prevent acid reflux or stomach pain. Never   Worry about not having enough food to eat. Never   Have you been to the food shelf at least a few times this year? No   I eat when I am depressed. Almost Everyday   I eat when I am stressed. Almost Everyday   I eat when I am bored. Almost Everyday   I eat when I am happy or as a reward. Almost Everyday   I feel hungry all the time even if I just have eaten. A Few Times a Week   Feeling full is important to me. Almost Everyday   I finish all the food on my plate even if I am already full. Everyday   I can't resist eating delicious food or walk past the good food/smell. Everyday   I eat/snack without noticing that I am eating. Almost Everyday   I eat when I am preparing the meal. Almost Everyday   I eat more than usual when I see others eating. Almost Everyday   I have trouble not eating sweets, ice cream, cookies, or chips if they are around the house. Almost Everyday   I think about food all day. A Few Times a Week   What foods, if any, do you crave? Sweets/Candy/Chocolate   Please list any other foods you crave? Rice with every meals       Amount of Food 12/8/2022   I make myself vomit what I have eaten or use laxatives to get rid of food. Weekly   I eat a large amount of food, like a loaf of bread, a box of cookies, a pint/quart of ice cream, all at once. Weekly   I eat a large amount of food even when I am not hungry. Weekly   I eat rapidly. Almost Everyday   I eat alone because I feel embarrassed and do not want others to see how much I have  "eaten. Almost Everyday   I eat until I am uncomfortably full. Everyday   I feel bad, disgusted, or guilty after I overeat. Everyday   I make myself vomit what I have eaten or use laxatives to get rid of food. Weekly       Activity/Exercise History 2022   How much of a typical 12 hour day do you spend sitting? Half the Day   How much of a typical 12 hour day do you spend lying down? Less Than Half the Day   How much of a typical day do you spend walking/standing? Less Than Half the Day   How many hours (not including work) do you spend on the TV/Video Games/Computer/Tablet/Phone? 4-5 Hours   How many times a week are you active for the purpose of exercise? Once a Week   What keeps you from being more active? Lack of Time, Too tired   How many total minutes do you spend doing some activity for the purpose of exercising when you exercise? 15-30 Minutes       PAST MEDICAL HISTORY:  Past Medical History:   Diagnosis Date     Acute cholecystitis 10/02/2020     Gestational diabetes mellitus (GDM) 10/12/2016    insulin dependant for over 4 mos     History of gestational diabetes in prior pregnancy, currently pregnant in second trimester 2021     Menarche 18 y/o    Menarche 18 y/o, menses qmo, bleeding x7 days (heavier since birth of 2nd child)      (normal spontaneous vaginal delivery) 10/30/2015    - 10/31/15:  40w6d Lucinda's/Facundo, labor 2h57m (push 8m, CHRIS), no pain meds, no lac, EBL 175mL (recd pitocin 10mU IM). GBS neg. Female BW 7#14.3 oz, L 20\", HC 13.5\", apgars 9/9. formula-feeding          (normal spontaneous vaginal delivery) 2016     STAR (obstructive sleep apnea)     does not wear cpap, tried using it but was not using it enough-reports no cpap at home 3/2/22     Postpartum depression     possible postpartum depression, mild     Supervision of high risk pregnancy in second trimester 3/19/2015     per Dr Loredo: FINAL EDC 10/25/15 by 19-5 u/s at [facility] on 6/5/15 c/w  " UNsure LMP mid 1/2015 Obesity, BMI >40   7/8/15 24-3: 1 hr , HgbA1C 5.1 Replacement Utility updated for latest IMO load      UTI in pregnancy 6/2/2014    - 6/2/14 prenatal 36+wk GA: UCx >100k eColi pan-sensitive - 6/10/15 prenatal 20-3 (walk in clinic): UCx >100k eColi (R amp/tetracycline/TMP-SMX), treated with nitrofurantoin - 7/8/2015 prenatal 24w3d: sxs resolved, Ucx NEG  - 7/21/2015 MyChart message: c/o strong odor back again, advised NTBS and check urine culture and if again has UTI then will need to keep her on prophylactic antibiotics for dur       Work/Social History Reviewed With Patient 12/8/2022   My employment status is: Part-Time, Student   My job is: Certified nursinf assistant   How much of your job is spent on the computer or phone? Less Than 50%   What is your marital status? /In a Relationship   If in a relationship, is your significant other overweight? No   Do you have children? Yes   If you have children, are they overweight? Yes   Who do you live with?  Spouse and children   Are they supportive of your health goals? No   Who does the food shopping?  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 Very supportive .     Mental Health History Reviewed With Patient 12/8/2022   Have you ever been physically or sexually abused? No   If yes, do you feel that the abuse is affecting your weight? N/A   If yes, would you like to talk to a counselor about the abuse? N/A   How often in the past 2 weeks have you felt little interest or pleasure in doing things? For Several Days   Over the past 2 weeks how often have you felt down, depressed, or hopeless? For Several Days   Mom recently past away. Wants to start therapy, but feeling overwhelmed right now. Declines therapy at this time.     Sleep History Reviewed With Patient 12/8/2022   How many hours do you sleep at night? 8   Do you think that you snore loudly or has anybody ever heard you snore loudly (louder than  "talking or so loud it can be heard behind a shut door)? Yes   Has anyone seen or heard you stop breathing during your sleep? Yes   Do you often feel tired, fatigued, or sleepy during the day? Yes   Do you have a TV/Computer in your bedroom? Yes       MEDICATIONS:   Current Outpatient Medications   Medication Sig Dispense Refill     ACCU-CHEK GUIDE test strip USE TO TEST BLOOD SUGAR 4 TIMES DAILY OR AS DIRECTED./ TXHUA HNUB NTSUAS NTSHAV QAB ZIB 4 ZAUG LI TUS SHIELA MOB QHIA       Alcohol Swabs (ALCOHOL PREP) 70 % PADS USE 1 EACH 4 TIMES DAILY/ TXHUA HNUB SIV 4 ZAUG       blood glucose monitoring (SOFTCLIX) lancets USE TO TEST BLOOD SUGAR 4 TIMES DAILY OR AS DIRECTED./ TXHUA HNUB NTSUAS NTSHAV QAB ZIB 4 ZAUG LI TUS SHIELA MOB QHIA       Blood Glucose Monitoring Suppl (ACCU-CHEK GUIDE ME) w/Device KIT USE 1 EACH ONCE FOR 1 DOSE       etonogestrel (NEXPLANON) 68 MG IMPL 1 each (68 mg) by Subdermal route continuous       metFORMIN (GLUCOPHAGE) 500 MG tablet Take 1 tablet (500 mg) by mouth 2 times daily (with meals) for 180 days 180 tablet 1     Tirzepatide (MOUNJARO) 2.5 MG/0.5ML SOPN Inject 2.5 mg Subcutaneous every 7 days 2 mL 1       ALLERGIES:   No Known Allergies          Objective    BP (!) 137/93 (BP Location: Left arm, Patient Position: Sitting, Cuff Size: Adult Large)   Pulse 79   Ht 1.676 m (5' 6\")   Wt 123.5 kg (272 lb 4.8 oz)   LMP 10/31/2022   SpO2 99%   BMI 43.95 kg/m    Physical Exam   GENERAL: Healthy, alert and no distress  EYES: Eyes grossly normal to inspection.  No discharge or erythema, or obvious scleral/conjunctival abnormalities.  RESP: No audible wheeze, cough, or visible cyanosis.  No visible retractions or increased work of breathing.    SKIN: Visible skin clear. No significant rash, abnormal pigmentation or lesions.  NEURO: Cranial nerves grossly intact.  Mentation and speech appropriate for age.  PSYCH: Mentation appears normal, affect normal/bright, judgement and insight intact, normal " speech and appearance well-groomed.     Anti-obesity medication ROS:    HEENT  Hx of glaucoma: No    Cardiovascular  CAD:No  HTN:No    Gastrointestinal  GERD:No  Constipation:No   Diarrhea: Yes, takes imodium. But then will make her constipated.   Liver Dz:Yes  H/O Pancreatitis:No    Psychiatric  Bipolar: No  Anxiety:No  Depression:No  History of alcohol/drug abuse: No  Hx of eating disorder:No    Endocrine  Personal or family hx of MTC or MEN2:No  Diabetes/prediabetes: Yes    Neurologic:  Hx of seizures: No  Hx of migraines: No  Memory Impairment: No      History of kidney stones: No  Kidney disease: No  Current birth control: Nexplanon      Sincerely,    ABNER VILLEGAS PA-C

## 2022-12-08 NOTE — NURSING NOTE
"(   Chief Complaint   Patient presents with     New Patient     New MWM    )    ( Weight: 123.5 kg (272 lb 4.8 oz) )  ( Height: 167.6 cm (5' 6\") )  ( BMI (Calculated): 43.95 )  (   )  (   )  (   )  (   )  (   )  (   )    ( BP: (!) 137/93 )  (   )  (   )  (   )  ( Pulse: 79 )  (   )  ( SpO2: 99 % )    (   Patient Active Problem List   Diagnosis     Environmental allergies     Recurrent cold sores (oral)     Acanthosis nigricans     Morbid obesity (H)     Insulin controlled gestational diabetes mellitus (GDM) in third trimester     Obesity affecting pregnancy in first trimester     Abnormal ultrasound     Prediabetes     Shoulder dystocia during labor and delivery     Lightheadedness     Pyelonephritis    )  (   Current Outpatient Medications   Medication Sig Dispense Refill     ACCU-CHEK GUIDE test strip USE TO TEST BLOOD SUGAR 4 TIMES DAILY OR AS DIRECTED./ TXHUA HNUB NTSUAS NTSHAV QAB ZIB 4 ZAUG LI TUS SHIELA MOB QHIA       Alcohol Swabs (ALCOHOL PREP) 70 % PADS USE 1 EACH 4 TIMES DAILY/ TXHUA HNUB SIV 4 ZAUG       blood glucose monitoring (SOFTCLIX) lancets USE TO TEST BLOOD SUGAR 4 TIMES DAILY OR AS DIRECTED./ TXHUA HNUB NTSUAS NTSHAV QAB ZIB 4 ZAUG LI TUS SHIELA MOB QHIA       Blood Glucose Monitoring Suppl (ACCU-CHEK GUIDE ME) w/Device KIT USE 1 EACH ONCE FOR 1 DOSE       etonogestrel (NEXPLANON) 68 MG IMPL 1 each (68 mg) by Subdermal route continuous       metFORMIN (GLUCOPHAGE) 500 MG tablet Take 1 tablet (500 mg) by mouth 2 times daily (with meals) for 180 days 180 tablet 1     acetaminophen (TYLENOL) 325 MG tablet Take 2 tablets (650 mg) by mouth every 4 hours as needed for mild pain, other, fever or headaches (and adjunct with moderate or severe pain or per patient request)       Acetone, Urine, Test (KETONE TEST) STRP USE TO TEST URINE ONCE DAILY IN THE MORNING BEFORE EATING OR DRINKING. *50 DAYS*       methocarbamol (ROBAXIN) 500 MG tablet Take 1 tablet (500 mg) by mouth 3 times daily as needed for muscle " spasms 20 tablet 0    )  ( Diabetes Eval:    )    ( Pain Eval:  No Pain (0) )    ( Wound Eval:       )    (   History   Smoking Status     Never   Smokeless Tobacco     Never    )    ( Signed By:  Mariza Corrales, EMT; December 8, 2022; 3:59 PM )

## 2022-12-08 NOTE — PROGRESS NOTES
"Vijay Betancur is a 32 year old female who is being evaluated via a billable video visit.      The patient has been notified of following:     \"This video visit will be conducted via a call between you and your physician/provider. We have found that certain health care needs can be provided without the need for an in-person physical exam.  This service lets us provide the care you need with a video conversation.  If a prescription is necessary we can send it directly to your pharmacy.  If lab work is needed we can place an order for that and you can then stop by our lab to have the test done at a later time.    Video visits are billed at different rates depending on your insurance coverage.  Please reach out to your insurance provider with any questions.    If during the course of the call the physician/provider feels a video visit is not appropriate, you will not be charged for this service.\"    Patient has given verbal consent for Video visit? Yes  How would you like to obtain your AVS? MyChart  If you are dropped from the video visit, the video invite should be resent to: Text to cell phone: [phone]  Will anyone else be joining your video visit? No  {If patient encounters technical issues they should call [phone]      Video-Visit Details    Type of service:  Video Visit    Video Start Time: 9:55 AM  Video End Time: 10:14 AM    Originating Location (pt. Location): Home    Distant Location (provider location):  Offsite (providers home)     Platform used for Video Visit: VtagO    During this virtual visit the patient is located in [place], patient verifies this as the location during the entirety of this visit.       New Weight Management Nutrition Consultation    Vijay Betancur is a 32 year old female presents today for new weight management nutrition consultation.  Patient referred by ELIOT Beavers on December 8, 2022.    Patient with Co-morbidities of obesity including:  Type II DM yes  Renal Failure no  Sleep " "apnea no  Hypertension no   Dyslipidemia no  Joint pain yes  Back pain yes  GERD no     Anthropometrics:  Estimated body mass index is 43.09 kg/m  as calculated from the following:    Height as of 7/2/22: 1.676 m (5' 6\").    Weight as of 11/1/22: 121.1 kg (267 lb).     Current weight: 272 lbs per pt    Medications for Weight Loss:  Mounjaro if covered     NUTRITION HISTORY  Food allergies: none  Food intolerances: none  Supplements: none  Previous methods of diet modification for weight loss: keto    Per Arabella Ariza PA: Obesity onset in middle school. Increase weight gain through 6 pregnancies. Has been able to lose weight in past, but struggles to sustain weight loss. Most concerned with new diagnosis of Type II diabetes and fatty liver. Wants to be healthier prior to starting for another pregnancy. Eating 3 meals a day when not working. Fasting all day when at work, and then will come home and will eat all night. Increased hunger. Struggles to get full. Craves pastries. Will eat out 2xweek. She does all the grocery shopping and cooking.      12/9/22: States she struggles mainly with portion sizes. Fasts during work and then binge eats in the evenings. Feels she might be eating too many carbs.    Recent food recall:  For all meals - rice + pork or chicken + vegetables   Snacks - chips, cookies, whatever is in house   Drinks - water, apple juice, regular soda (3xweek), coffee   ETOH - neve    Physical Activity:  Standing and walking at work  No specific exercise routine- trying to use elliptical at home     Nutrition Prescription  Recommended energy/nutrient modification.    Nutrition Diagnosis  Obesity r/t long history of positive energy balance aeb BMI >30.    Nutrition Intervention  Materials/education provided on hypocaloric diet for weight loss. Discussed 1500 calorie/day diet, Volumetric eating to help satiety level on fewer calories; portion control and healthy food choices (Plate Method and Volumetrics " "handouts), 100 calorie snack choices, meal and snack planning and websites, sample meal plans     Patient demonstrates understanding.    Expected Engagement: good    Nutrition Goals  1) Follow 1500 calorie/day plan   -[email or web address]  2) Eat three meals per day   -utilize protein shake if needed  3) 9\" Plate method (1/2 plate non-starchy vegetables/fruit, 1/4 plate lean protein, 1/4 plate whole grain starch - no more than 1/2 cup carb/meal)  4) Drink 48-64 ounces of fluid per day  5) Increase activity as able   6) Avoid snacking as able. If snack is needed use lean protein and/or fruit/vegetable. Examples:   - 2 cup popcorn   - 1 cup mixed berries   - 15 almonds, walnuts, cashews   - carrot/celery sticks and 2 tbsp low-fat ranch   - 1 hard boiled egg   - Part-skim mozzarella cheese stick   - Low-fat, low-sugar greek yogurt with 1/2 cup berries   - Med apple or pear   - sliced bell peppers with 1/2 cup salsa   - 1/2 cup roasted chickpeas   - sliced cucumbers with vinegar    100 calorie sweets: Smart Sweets, Fiber One desserts, Fit and Active 100 calorie snack sweets at [employer]; Nabisco 100 calorie pre-portioned cookies, sugar-free pudding, sugar-free jello.    Snack Recipes:  - Banana and creamy PB dip (https://www.diabetesfoodhub.org/recipes/sweet-peanut-buttery-dip.html?home-category_id=23)  - Roasted chickpeas (https://www.diabetesfoodhub.org/recipes/roasted-and-spiced-chickpeas.html?home-category_id=23)  - Lemon Raspberry raphael seed pudding (https://www.diabetesfoodhub.org/recipes/lemon-raspberry- raphael-seed-pudding.html?home-category_id=23)  - Black bean hummus with carrot and celery sticks (https://www.diabetesfoodhub.org/recipes/black-bean-hummus.html?home-category_id=23)  - Greek yogurt chocolate mouse (https://www.diabetesfoodhub.org/recipes/greek-yogurt-chocolate-mousse.html?home-category_id=23)   - Broccoli Cheese " Bites  (https://www.diabetesfoodhub.org/recipes/broccoli-cheese-bites.html?home-category_id=20)  - Chicken Satay with peanut sauce  (https://www.diabetesfoodPervasip.org/recipes/blueberry-almond-chicken-salad-lettuce-wraps.html?home-category_id=20)  - Deviled Eggs  (https://www.diabetesfoodPervasip.org/recipes/devilled-eggs.html?home-category_id=20)    Meal Replacement Shake Options:   *Protein Shake Criteria: no more than 210 Calories, at least 20 grams of protein, and less than 10 grams of sugar   [facility] smoothie (160 Calories, 20 g protein)   Premier Protein (160 Calories, 30 g protein)  Slim Fast Advanced Nutrition (180 Calories, 20 g protein)  Muscle Milk, lactose-free, 17 oz bottle (210 Calories, 30 g protein)  Integrated Supplements, no artificial sugars (110 Calories, 20 g protein)  Genepro, unflavored protein powder (60 Calories, 30 g protein)  Boost/Ensure Max (160 calories, 30 gm protein)   Fairlife Core Power (170 calories, 26 gm protein)  [employer]'s Elevation Protein Powder (180 calories, 30 gm protein)     Meal Replacement Bar Options:  [facility] Protein Shake (160 Calories, 15 g protein)  Quest Protein Bars (190 Calories, 20 g protein)  Built Bar (170 Calories, 15-20 g protein)  One Protein Bar (210 calories, 20 g protein)  [place] Signature Protein Bar ([employer]co) (190 Calories, 21 g protein)  Pure Protein Bars (180 Calories, 21 g protein)    Low Calorie Frozen Meal:  Healthy Choice Power Bowls  Lean Cuisine  Smart Ones  Wu Farley      The Plate Method  [email or web address]    Protein Sources   http://[employer]XL [email or web address] [email or web address]     Carbohydrates  [email or web address]     Mindful Eating  http://[employer]XL [email or web address] [email or web address]     Summary of Volumetrics Eating Plan  [email or web address]     Follow-Up:  1 month, PRN    Time spent with patient: 19 minutes.  NITHYA BOYD RD, LD

## 2022-12-09 ENCOUNTER — TELEPHONE (OUTPATIENT)
Dept: ENDOCRINOLOGY | Facility: CLINIC | Age: 32
End: 2022-12-09

## 2022-12-09 ENCOUNTER — VIRTUAL VISIT (OUTPATIENT)
Dept: ENDOCRINOLOGY | Facility: CLINIC | Age: 32
End: 2022-12-09
Payer: COMMERCIAL

## 2022-12-09 DIAGNOSIS — E11.9 TYPE 2 DIABETES MELLITUS WITHOUT COMPLICATION, WITHOUT LONG-TERM CURRENT USE OF INSULIN (H): ICD-10-CM

## 2022-12-09 DIAGNOSIS — E66.813 CLASS 3 SEVERE OBESITY WITHOUT SERIOUS COMORBIDITY WITH BODY MASS INDEX (BMI) OF 40.0 TO 44.9 IN ADULT, UNSPECIFIED OBESITY TYPE (H): ICD-10-CM

## 2022-12-09 DIAGNOSIS — Z71.3 NUTRITIONAL COUNSELING: Primary | ICD-10-CM

## 2022-12-09 DIAGNOSIS — E66.01 CLASS 3 SEVERE OBESITY WITHOUT SERIOUS COMORBIDITY WITH BODY MASS INDEX (BMI) OF 40.0 TO 44.9 IN ADULT, UNSPECIFIED OBESITY TYPE (H): ICD-10-CM

## 2022-12-09 LAB — DEPRECATED CALCIDIOL+CALCIFEROL SERPL-MC: 14 UG/L (ref 20–75)

## 2022-12-09 PROCEDURE — 97802 MEDICAL NUTRITION INDIV IN: CPT | Mod: GT | Performed by: DIETITIAN, REGISTERED

## 2022-12-09 NOTE — PATIENT INSTRUCTIONS
"Hi Mao!    Follow-up with RD in 1 month    Thank you,    Marcia Fuentes, RD, LD  If you would like to schedule or reschedule an appointment with the RD, please call [phone]    Nutrition Goals  1) Follow 1500 calorie/day plan   -[email or web address] [email or web address]  2) Eat three meals per day   -utilize protein shake if needed  3) 9\" Plate method (1/2 plate non-starchy vegetables/fruit, 1/4 plate lean protein, 1/4 plate whole grain starch - no more than 1/2 cup carb/meal)  4) Drink 48-64 ounces of fluid per day  5) Increase activity as able   6) Avoid snacking as able. If snack is needed use lean protein and/or fruit/vegetable. Examples:   - 2 cup popcorn   - 1 cup mixed berries   - 15 almonds, walnuts, cashews   - carrot/celery sticks and 2 tbsp low-fat ranch   - 1 hard boiled egg   - Part-skim mozzarella cheese stick   - Low-fat, low-sugar greek yogurt with 1/2 cup berries   - Med apple or pear   - sliced bell peppers with 1/2 cup salsa   - 1/2 cup roasted chickpeas   - sliced cucumbers with vinegar    100 calorie sweets: Smart Sweets, Fiber One desserts, Fit and Active 100 calorie snack sweets at [employer]; N[employer] 100 calorie pre-portioned cookies, sugar-free pudding, sugar-free jello.    Snack Recipes:  - Banana and creamy PB dip (https://www.diabetesfoodhub.org/recipes/sweet-peanut-buttery-dip.html?home-category_id=23)  - Roasted chickpeas (https://www.diabetesfoodhub.org/recipes/roasted-and-spiced-chickpeas.html?home-category_id=23)  - Lemon Raspberry raphael seed pudding (https://www.diabetesfoodhub.org/recipes/lemon-raspberry- raphael-seed-pudding.html?home-category_id=23)  - Black bean hummus with carrot and celery sticks (https://www.diabetesfoodhub.org/recipes/black-bean-hummus.html?home-category_id=23)  - Greek yogurt chocolate mouse (https://www.diabetesfoZeptorb.org/recipes/greek-yogurt-chocolate-mousse.html?home-category_id=23)   - Broccoli Cheese " Bites  (https://www.diabetesfoodhub.org/recipes/broccoli-cheese-bites.html?home-category_id=20)  - Chicken Satay with peanut sauce  (https://www.diabetesfoodCloud Sustainability.org/recipes/blueberry-almond-chicken-salad-lettuce-wraps.html?home-category_id=20)  - Deviled Eggs  (https://www.diabetesfoodCloud Sustainability.org/recipes/devilled-eggs.html?home-category_id=20)    Meal Replacement Shake Options:   *Protein Shake Criteria: no more than 210 Calories, at least 20 grams of protein, and less than 10 grams of sugar   [facility] smoothie (160 Calories, 20 g protein)   Premier Protein (160 Calories, 30 g protein)  Slim Fast Advanced Nutrition (180 Calories, 20 g protein)  Muscle Milk, lactose-free, 17 oz bottle (210 Calories, 30 g protein)  Integrated Supplements, no artificial sugars (110 Calories, 20 g protein)  Genepro, unflavored protein powder (60 Calories, 30 g protein)  Boost/Ensure Max (160 calories, 30 gm protein)   SpunLive Core Power (170 calories, 26 gm protein)  [employer]i's Elevation Protein Powder (180 calories, 30 gm protein)     Meal Replacement Bar Options:  [facility] Protein Shake (160 Calories, 15 g protein)  Quest Protein Bars (190 Calories, 20 g protein)  Built Bar (170 Calories, 15-20 g protein)  One Protein Bar (210 calories, 20 g protein)  [place] Signature Protein Bar (Costco) (190 Calories, 21 g protein)  Pure Protein Bars (180 Calories, 21 g protein)    Low Calorie Frozen Meal:  Healthy Choice Power Bowls  Lean Cuisine  Smart Ones  Wu Farley      The Plate Method  [email or web address]    Protein Sources   http://[employer]/[email or web address]     Carbohydrates  [email or web address]     Mindful Eating  http://[employer]/564846.pdf     Summary of Volumetrics Eating Plan  [email or web address]       Interested in working with a health ? Health coaches work with you to improve your overall health and wellbeing. They look at the whole person, and may involve discussion of different  areas of life, including, but not limited to the four pillars of health (sleep, exercise, nutrition, and stress management). Discuss with your care team if you would like to start working a health .    Health Coaching-3 Pack:    $99 for three health coaching visits    Visits may be done in person or via phone    Coaching is a partnership between the  and the client; Coaches do not prescribe or diagnose    Coaching helps inspire the client to reach his/her personal goals    COMPREHENSIVE WEIGHT MANAGEMENT PROGRAM  VIRTUAL SUPPORT GROUPS    For Support Group Information:      We offer support groups for patients who are working on weight loss and considering, preparing for or have had weight loss surgery.   There is no cost for this opportunity.  You are invited to attend the?Virtual Support Groups?provided by any of the following locations:    [facility] via Microsoft Teams with Angeles Elizondo RN  2.   [place] via [employer] with Jaycob Mclaughlin, PhD, LP  3.   [place] via [employer] with Jenn Andrews RN  4.   [facility] via Microsoft Teams with Jenn Haque [facility]    The following Support Group information can also be found on our website:  [email or web address]      [facility] Weight Loss Surgery Support Group    [facility] Weight Loss Surgery Support Group  The support group is a patient-lead forum that meets monthly to share experiences, encouragement and education. It is open to those who have had weight loss surgery, are scheduled for surgery, and those who are considering surgery.   WHEN: This group meets on the 3rd Wednesday of each month from 5:00PM - 6:00PM virtually using Microsoft Teams.   FACILITATOR: Led by Angeles Elizondo, RD, LD, RN, the program's Clinical Coordinator.   TO REGISTER: Please contact the clinic via [employer] or call the nurse line directly at [phone] to inform our staff that  "you would like an invite sent to you and to let us know the email you would like the invite sent to. Prior to the meeting, a link with directions on how to join the meeting will be sent to you.    2022 Meetings  January 19: \"Let's Talk\" a time for the group to share.  February 16: \"Let's Talk\" a time for the group to share.  March 16: Guest Speakers: Psychologists, Denae Foreman, PhD,LP and Alba Medina PsyD,LP  April 20: Guest Speaker: Health , Massiel Chung, [facility],CHES, CPT  May 18: Guest Speaker: Dietitian, Bennett Guerrero, RD, LP  Oralia 15: \"Let's Talk\" a time for the group to share.  July 20: \"Let's Talk\" a time for the group to share.  August 17: TBA  September 21: TBA  October 19: Guest Speaker: Dr Kurt Mckeon MD Pulmonologist and Sleep Medicine Physician, \"Getting a Good Night's Sleep\".  November 16: TBA  December 21: TBA    [facility] Support Groups    Connections: Bariatric Care Support Group?  This is open to all [facility] (and those external to this program) pre- and post- operative bariatric surgery patients as well as their support system.   WHEN: This group meets the 2nd Tuesday of each month from 6:30 PM - 8:00 PM virtually using Microsoft Teams.   FACILITATOR: Led by Jaycob Mclaughlin, Ph.D who is a Licensed Psychologist with the [facility] Comprehensive Weight Management Program.   TO REGISTER: Please send an email to Jaycob Mclaughlin, Ph.D.,  at?[email or web address]?if you would like an invitation to the group and to learn about using Microsoft Teams.    2022 Meetings January 11: Cori Casanova, PharmD, Pharmacy Resident at [facility], \"Medications and Bariatric Surgery\".  February 8: Open Forum  March 8  April 12  May 10  Oralia 14    Connections: Post-Operative Bariatric Surgery Support Group  This is a support group for [facility] bariatric patients (and those external to [facility]) who have had bariatric surgery and " "are at least 3 months post-surgery.  WHEN: This support group meets the 4th Wednesday of the month from 11:00 AM - 12:00 PM virtually using Microsoft Teams.   FACILITATOR: Led by Certified Bariatric Nurse, Jenn Andrews RN.   TO REGISTER: Please send an email to Jenn at [email or web address] if you would like an invitation to the group and to learn about using Microsoft Teams.    2022 Meetings  January 26  February 23  March 23  April 27  May 25  Oralia 22    [facility] Healthy Lifestyle Virtual Support Group    Healthy Lifestyle Virtual Support Group?  This is 60 minutes of small group guided discussion, support and resources. All are welcome who want a healthy lifestyle.  WHEN: This group meets monthly on a Friday from 12:30 PM - 1:30 PM virtually using Microsoft Teams.   FACILITATOR: Led by National Board Certified Health and , Jenn Haque [facility].   TO REGISTER: Please send an email to Jenn at?[email or web address] to receive monthly invites to the group or if you have any questions about having a health .  Prior to the meeting, a link with directions on how to join the meeting will be sent to you.    2022 Meetings  January 21: Shana Baca MS, RN, CIC, CBN, \"Healthy Habits\"  February 25: Open Forum  March 18: \"Setting Limits and Boundaries\"  April 29: Mary Wright RD, \"Meal Planning Made Easy\"  May 20: Open Forum  June: To be determined                "

## 2022-12-09 NOTE — ASSESSMENT & PLAN NOTE
Obesity onset in middle school. Increase weight gain through 6 pregnancies. Has been able to lose weight in past, but struggles to sustain weight loss. Most concerned with new diagnosis of Type II diabetes and fatty liver. Wants to be healthier prior to starting for another pregnancy.     AOMs discussed today:   - Phentermine - can be considered in future.   - Topiramate - concern for brain fog   - Naltrexone- can be considered in future.   - GLP-1 to be started today for further control of type II diabetes and weight loss. Most recent A1C was 8.3. Discussed side effect and benefits. History of diarrhea, Imodium for relief that, leads to constipation. Will monitor with medication start. Will start Mounjaro 2.5mg weekly. If not covered will consider ozempic. All questions answered.

## 2022-12-09 NOTE — TELEPHONE ENCOUNTER
PA Initiation    Medication: Mounjaro- pa pending   Insurance Company: [employer] ([facility]) - Phone [phone] Fax [phone]  Pharmacy Filling the Rx:    Filling Pharmacy Phone:    Filling Pharmacy Fax:    Start Date: 12/9/2022    Key: [id number]

## 2022-12-09 NOTE — TELEPHONE ENCOUNTER
BRADLEY and sent [employer]      Schedule a follow up appt with Arabella Woodall in 4 weeks (around 1/5/23)

## 2022-12-09 NOTE — LETTER
"12/9/2022       RE: Vijay Betancur  [address] [place] [address]     Dear Colleague,    Thank you for referring your patient, Vijay Betancur, to the [facility] [place] at [facility]. Please see a copy of my visit note below.    Vijay Betancur is a 32 year old female who is being evaluated via a billable video visit.      The patient has been notified of following:     \"This video visit will be conducted via a call between you and your physician/provider. We have found that certain health care needs can be provided without the need for an in-person physical exam.  This service lets us provide the care you need with a video conversation.  If a prescription is necessary we can send it directly to your pharmacy.  If lab work is needed we can place an order for that and you can then stop by our lab to have the test done at a later time.    Video visits are billed at different rates depending on your insurance coverage.  Please reach out to your insurance provider with any questions.    If during the course of the call the physician/provider feels a video visit is not appropriate, you will not be charged for this service.\"    Patient has given verbal consent for Video visit? Yes  How would you like to obtain your AVS? MyChart  If you are dropped from the video visit, the video invite should be resent to: Text to cell phone: [phone]  Will anyone else be joining your video visit? No  {If patient encounters technical issues they should call [phone]      Video-Visit Details    Type of service:  Video Visit    Video Start Time: 9:55 AM  Video End Time: 10:14 AM    Originating Location (pt. Location): Home    Distant Location (provider location):  Offsite (providers home)     Platform used for Video Visit: [employer]    During this virtual visit the patient is located in [place], patient verifies this as the location during the entirety of this visit.       New " "Weight Management Nutrition Consultation    Mao Her is a 32 year old female presents today for new weight management nutrition consultation.  Patient referred by ELIOT Beavers on December 8, 2022.    Patient with Co-morbidities of obesity including:  Type II DM yes  Renal Failure no  Sleep apnea no  Hypertension no   Dyslipidemia no  Joint pain yes  Back pain yes  GERD no     Anthropometrics:  Estimated body mass index is 43.09 kg/m  as calculated from the following:    Height as of 7/2/22: 1.676 m (5' 6\").    Weight as of 11/1/22: 121.1 kg (267 lb).     Current weight: 272 lbs per pt    Medications for Weight Loss:  Mounjaro if covered     NUTRITION HISTORY  Food allergies: none  Food intolerances: none  Supplements: none  Previous methods of diet modification for weight loss: keto    Per ELIOT Beavers: Obesity onset in middle school. Increase weight gain through 6 pregnancies. Has been able to lose weight in past, but struggles to sustain weight loss. Most concerned with new diagnosis of Type II diabetes and fatty liver. Wants to be healthier prior to starting for another pregnancy. Eating 3 meals a day when not working. Fasting all day when at work, and then will come home and will eat all night. Increased hunger. Struggles to get full. Craves pastries. Will eat out 2xweek. She does all the grocery shopping and cooking.      12/9/22: States she struggles mainly with portion sizes. Fasts during work and then binge eats in the evenings. Feels she might be eating too many carbs.    Recent food recall:  For all meals - rice + pork or chicken + vegetables   Snacks - chips, cookies, whatever is in house   Drinks - water, apple juice, regular soda (3xweek), coffee   ETOH - neve    Physical Activity:  Standing and walking at work  No specific exercise routine- trying to use elliptical at home     Nutrition Prescription  Recommended energy/nutrient modification.    Nutrition Diagnosis  Obesity r/t long history " "of positive energy balance aeb BMI >30.    Nutrition Intervention  Materials/education provided on hypocaloric diet for weight loss. Discussed 1500 calorie/day diet, Volumetric eating to help satiety level on fewer calories; portion control and healthy food choices (Plate Method and Volumetrics handouts), 100 calorie snack choices, meal and snack planning and websites, sample meal plans     Patient demonstrates understanding.    Expected Engagement: good    Nutrition Goals  1) Follow 1500 calorie/day plan   -[email or web address]  2) Eat three meals per day   -utilize protein shake if needed  3) 9\" Plate method (1/2 plate non-starchy vegetables/fruit, 1/4 plate lean protein, 1/4 plate whole grain starch - no more than 1/2 cup carb/meal)  4) Drink 48-64 ounces of fluid per day  5) Increase activity as able   6) Avoid snacking as able. If snack is needed use lean protein and/or fruit/vegetable. Examples:   - 2 cup popcorn   - 1 cup mixed berries   - 15 almonds, walnuts, cashews   - carrot/celery sticks and 2 tbsp low-fat ranch   - 1 hard boiled egg   - Part-skim mozzarella cheese stick   - Low-fat, low-sugar greek yogurt with 1/2 cup berries   - Med apple or pear   - sliced bell peppers with 1/2 cup salsa   - 1/2 cup roasted chickpeas   - sliced cucumbers with vinegar    100 calorie sweets: Smart Sweets, Fiber One desserts, Fit and Active 100 calorie snack sweets at [employer]; [employer] 100 calorie pre-portioned cookies, sugar-free pudding, sugar-free jello.    Snack Recipes:  - Banana and creamy PB dip (https://www.diabetesfoodhub.org/recipes/sweet-peanut-buttery-dip.html?home-category_id=23)  - Roasted chickpeas (https://www.diabetesfoodhub.org/recipes/roasted-and-spiced-chickpeas.html?home-category_id=23)  - Lemon Raspberry raphael seed pudding (https://www.diabetesfoodhub.org/recipes/lemon-raspberry- raphael-seed-pudding.html?home-category_id=23)  - Black bean hummus with carrot and celery sticks " (https://www.diabetesfoodhub.org/recipes/black-bean-hummus.html?home-category_id=23)  - Greek yogurt chocolate mouse (https://www.diabetesfoodVirtual Cityb.org/recipes/greek-yogurt-chocolate-mousse.html?home-category_id=23)   - Broccoli Cheese Bites  (https://www.diabetesfoodMinicom Digital Signage.org/recipes/broccoli-cheese-bites.html?home-category_id=20)  - Chicken Satay with peanut sauce  (https://www.diabetesfoodMinicom Digital Signage.org/recipes/blueberry-almond-chicken-salad-lettuce-wraps.html?home-category_id=20)  - Deviled Eggs  (https://www.diabetesfoodMinicom Digital Signage.org/recipes/devilled-eggs.html?home-category_id=20)    Meal Replacement Shake Options:   *Protein Shake Criteria: no more than 210 Calories, at least 20 grams of protein, and less than 10 grams of sugar   [facility] smoothie (160 Calories, 20 g protein)   Premier Protein (160 Calories, 30 g protein)  Slim Fast Advanced Nutrition (180 Calories, 20 g protein)  Muscle Milk, lactose-free, 17 oz bottle (210 Calories, 30 g protein)  Integrated Supplements, no artificial sugars (110 Calories, 20 g protein)  Genepro, unflavored protein powder (60 Calories, 30 g protein)  Boost/Ensure Max (160 calories, 30 gm protein)   Fairlife Core Power (170 calories, 26 gm protein)  [employer]'s Elevation Protein Powder (180 calories, 30 gm protein)     Meal Replacement Bar Options:  [facility] Protein Shake (160 Calories, 15 g protein)  Quest Protein Bars (190 Calories, 20 g protein)  Built Bar (170 Calories, 15-20 g protein)  One Protein Bar (210 calories, 20 g protein)  Medina Signature Protein Bar ([employer]) (190 Calories, 21 g protein)  Pure Protein Bars (180 Calories, 21 g protein)    Low Calorie Frozen Meal:  Healthy Choice Power Bowls  Lean Cuisine  Smart Ones  Wu Farley      The Plate Method  [email or web address]    Protein Sources   [email or web address]     Carbohydrates  [email or web address]     Mindful Eating  [email or web address]     Summary of Volumetrics Eating  Plan  [email or web address]     Follow-Up:  1 month, PRN    Time spent with patient: 19 minutes.  NITYHA BOYD RD, LD

## 2022-12-12 DIAGNOSIS — E66.01 CLASS 3 SEVERE OBESITY WITHOUT SERIOUS COMORBIDITY WITH BODY MASS INDEX (BMI) OF 40.0 TO 44.9 IN ADULT, UNSPECIFIED OBESITY TYPE (H): Primary | ICD-10-CM

## 2022-12-12 DIAGNOSIS — E66.813 CLASS 3 SEVERE OBESITY WITHOUT SERIOUS COMORBIDITY WITH BODY MASS INDEX (BMI) OF 40.0 TO 44.9 IN ADULT, UNSPECIFIED OBESITY TYPE (H): Primary | ICD-10-CM

## 2022-12-14 ENCOUNTER — TELEPHONE (OUTPATIENT)
Dept: ENDOCRINOLOGY | Facility: CLINIC | Age: 32
End: 2022-12-14

## 2022-12-14 DIAGNOSIS — E11.9 TYPE 2 DIABETES MELLITUS WITHOUT COMPLICATION, WITHOUT LONG-TERM CURRENT USE OF INSULIN (H): Primary | ICD-10-CM

## 2022-12-14 NOTE — TELEPHONE ENCOUNTER
PRIOR AUTHORIZATION DENIED    Medication: Mounjaro- pa denied     Denial Date: 12/9/2022    Denial Rational: see denial letter     Appeal Information: see letter

## 2022-12-14 NOTE — TELEPHONE ENCOUNTER
"Prior Authorization Retail Medication Request    Medication/Dose: Ozempic  ICD code (if different than what is on RX):  Type 2 diabetes mellitus without complication, without long-term current use of insulin (H) [E11.9]  - Primary     Previously Tried and Failed:  History of diet and exercise   Rationale:  I had the pleasure of seeing your patient, Vijay Betancur. Full intake/assessment was done to determine barriers to weight loss success and develop a treatment plan. Vijay Betancur is a 32 year old female interested in treatment of medical problems associated with excess weight. She has a height of 5' 6\", a weight of 272 lbs 4.8 oz, and the calculated Body mass index is 43.95 kg/m . She has the following co-morbidities: Type II Diabetes, Fatty Liver, Stress Incontinence.     Obesity onset in middle school. Increase weight gain through 6 pregnancies. Has been able to lose weight in past, but struggles to sustain weight loss. Most concerned with new diagnosis of Type II diabetes and fatty liver. Wants to be healthier prior to starting for another pregnancy. Did a \"biggest loser\" and lost 60lbs, but regained weight through pandemic. Struggles to sustain weight loss.     Type II diabetes - Recent diagnosis. Started metformin. Has been checking blood sugar 4xday after eating. Averages 180-210. A1C 8.3 11/1/2022     Fatty liver - diagnosised this summer while hospitalized.      STAR - tried CPAP for 3 months, but  didn't like the noise. So returned it .     Highest weight in life - 293lb  Weight today - 272    Eating 3 meals a day when not working. Fasting all day when at work, and then will come home and will eat all night. Increased hunger. Struggles to get full. Craves pastries. Will eat out 2xweek. She does all the grocery shopping and cooking.    For all meals - rice + pork or chicken + vegetables   Snacks - chips, cookies, whatever is in house   Drinks - water, apple juice, regular soda (3xweek), coffee   ETOH - never  "      Activity - very active at home with house work and children.     Insurance Name:    Insurance ID:        Pharmacy Information (if different than what is on RX)  Name:  [facility] - [place], [place] - [address]  Phone:  [phone]

## 2022-12-14 NOTE — TELEPHONE ENCOUNTER
PA Initiation    Medication: Ozempic -pa pending  Insurance Company:    Pharmacy Filling the Rx:    Filling Pharmacy Phone:    Filling Pharmacy Fax:    Start Date: 12/14/2022    Key: [id number]

## 2022-12-20 DIAGNOSIS — E55.9 VITAMIN D DEFICIENCY: Primary | ICD-10-CM

## 2022-12-22 ENCOUNTER — TELEPHONE (OUTPATIENT)
Dept: ENDOCRINOLOGY | Facility: CLINIC | Age: 32
End: 2022-12-22

## 2022-12-22 DIAGNOSIS — E66.813 CLASS 3 SEVERE OBESITY WITHOUT SERIOUS COMORBIDITY WITH BODY MASS INDEX (BMI) OF 40.0 TO 44.9 IN ADULT, UNSPECIFIED OBESITY TYPE (H): Primary | ICD-10-CM

## 2022-12-22 DIAGNOSIS — E66.01 CLASS 3 SEVERE OBESITY WITHOUT SERIOUS COMORBIDITY WITH BODY MASS INDEX (BMI) OF 40.0 TO 44.9 IN ADULT, UNSPECIFIED OBESITY TYPE (H): Primary | ICD-10-CM

## 2022-12-22 NOTE — TELEPHONE ENCOUNTER
Central Prior Authorization Team   Phone: [phone]    PA Initiation    Medication: Saxenda  Insurance Company: [employer] ([facility]) - Phone [phone] Fax [phone]  Pharmacy Filling the Rx: [facility] - [place], [place] - [address]  Filling Pharmacy Phone: [phone]  Filling Pharmacy Fax:    Start Date: 12/22/2022

## 2022-12-22 NOTE — TELEPHONE ENCOUNTER
Kaitlynnjaved needs a prior auth based on the team claim. Patient's insurance is; BIN: [id number], GRP: [id number], & ID: [id number]  Please let the liaison know of the approval or denial.

## 2022-12-22 NOTE — TELEPHONE ENCOUNTER
PRIOR AUTHORIZATION DENIED    Medication: Ozempic -pa denied     Denial Date: 12/14/2022    Denial Rational: see denial letter     Appeal Information: see letter

## 2022-12-22 NOTE — TELEPHONE ENCOUNTER
"Prior Authorization Retail Medication Request    Medication/Dose: Saxenda  ICD code (if different than what is on RX):  Class 3 severe obesity without serious comorbidity with body mass index (BMI) of 40.0 to 44.9 in adult, unspecified obesity type (H) [E66.01, Z68.41]  - Primary     Previously Tried and Failed:  History of diet and exercise   Rationale:  I had the pleasure of seeing your patient, Vijay Betancur. Full intake/assessment was done to determine barriers to weight loss success and develop a treatment plan. Vijay Betancur is a 32 year old female interested in treatment of medical problems associated with excess weight. She has a height of 5' 6\", a weight of 272 lbs 4.8 oz, and the calculated Body mass index is 43.95 kg/m . She has the following co-morbidities: Type II Diabetes, Fatty Liver, Stress Incontinence.      Obesity onset in middle school. Increase weight gain through 6 pregnancies. Has been able to lose weight in past, but struggles to sustain weight loss. Most concerned with new diagnosis of Type II diabetes and fatty liver. Wants to be healthier prior to starting for another pregnancy. Did a \"biggest loser\" and lost 60lbs, but regained weight through pandemic. Struggles to sustain weight loss.     Type II diabetes - Recent diagnosis. Started metformin. Has been checking blood sugar 4xday after eating. Averages 180-210. A1C 8.3 11/1/2022     Fatty liver - diagnosised this summer while hospitalized.      STAR - tried CPAP for 3 months, but  didn't like the noise. So returned it .      Highest weight in life - 293lb  Weight today - 272     Eating 3 meals a day when not working. Fasting all day when at work, and then will come home and will eat all night. Increased hunger. Struggles to get full. Craves pastries. Will eat out 2xweek. She does all the grocery shopping and cooking.    For all meals - rice + pork or chicken + vegetables   Snacks - chips, cookies, whatever is in house   Drinks - water, apple " juice, regular soda (3xweek), coffee   ETOH - never       Activity - very active at home with house work and children.        Insurance Name:    Insurance ID:        Pharmacy Information (if different than what is on RX)  Name:  [facility] - [place], [place] - [id number]  Phone:  [phone]

## 2022-12-26 NOTE — TELEPHONE ENCOUNTER
Prior Authorization Approval    Authorization Effective Date: 12/22/2022  Authorization Expiration Date: 3/22/2023  Medication: Saxenda  Approved Dose/Quantity:   Reference #:     Insurance Company: Jessi ([facility]) - Phone [phone] Fax [phone]  Expected CoPay:       CoPay Card Available:      Foundation Assistance Needed:    Which Pharmacy is filling the prescription (Not needed for infusion/clinic administered): [facility] - [place], [place] - [address] [place] [address]  Pharmacy Notified: Yes  Patient Notified: Yes

## 2022-12-27 DIAGNOSIS — E66.01 CLASS 3 SEVERE OBESITY WITHOUT SERIOUS COMORBIDITY WITH BODY MASS INDEX (BMI) OF 40.0 TO 44.9 IN ADULT, UNSPECIFIED OBESITY TYPE (H): Primary | ICD-10-CM

## 2022-12-27 DIAGNOSIS — E66.813 CLASS 3 SEVERE OBESITY WITHOUT SERIOUS COMORBIDITY WITH BODY MASS INDEX (BMI) OF 40.0 TO 44.9 IN ADULT, UNSPECIFIED OBESITY TYPE (H): Primary | ICD-10-CM

## 2022-12-27 DIAGNOSIS — E11.9 TYPE 2 DIABETES MELLITUS WITHOUT COMPLICATION, WITHOUT LONG-TERM CURRENT USE OF INSULIN (H): ICD-10-CM

## 2023-03-08 ENCOUNTER — TELEPHONE (OUTPATIENT)
Dept: ENDOCRINOLOGY | Facility: CLINIC | Age: 33
End: 2023-03-08

## 2023-03-08 NOTE — TELEPHONE ENCOUNTER
Authorization Expiration Date: 3/22/2023    Prior auth for Saxenda expires on 03/22/2023. Patient insurance is; BIN: [id number], GRP: [id number], & ID: [id number]  Please let the liaison know of the approval or denial.

## 2023-03-10 LAB — RETINOPATHY: NORMAL

## 2023-03-12 NOTE — TELEPHONE ENCOUNTER
Patient's insurance plan needs an updated weight for renewal.  Please provide patient's current weight.

## 2023-05-08 ENCOUNTER — HEALTH MAINTENANCE LETTER (OUTPATIENT)
Age: 33
End: 2023-05-08

## 2023-07-11 ENCOUNTER — TELEPHONE (OUTPATIENT)
Dept: FAMILY MEDICINE | Facility: CLINIC | Age: 33
End: 2023-07-11
Payer: COMMERCIAL

## 2023-07-11 NOTE — TELEPHONE ENCOUNTER
Left VM for pt. Called to let pt know that Dr. Esposito is not taking new patients. If pt is looking to establish care here at the [facility] they will need to reschedule with another provider.     Also showing that pt has been seen by Endocrinology please assist pt is scheduling with endo if she is wanting to continue to be seen with them and have them monitor her diabetes.

## 2023-08-03 ENCOUNTER — OFFICE VISIT (OUTPATIENT)
Dept: FAMILY MEDICINE | Facility: CLINIC | Age: 33
End: 2023-08-03
Payer: COMMERCIAL

## 2023-08-03 VITALS
HEIGHT: 66 IN | RESPIRATION RATE: 20 BRPM | TEMPERATURE: 98.1 F | OXYGEN SATURATION: 97 % | WEIGHT: 263 LBS | BODY MASS INDEX: 42.27 KG/M2 | SYSTOLIC BLOOD PRESSURE: 120 MMHG | DIASTOLIC BLOOD PRESSURE: 84 MMHG | HEART RATE: 89 BPM

## 2023-08-03 DIAGNOSIS — E66.01 MORBID OBESITY (H): ICD-10-CM

## 2023-08-03 DIAGNOSIS — E11.9 TYPE 2 DIABETES MELLITUS WITHOUT COMPLICATION, WITHOUT LONG-TERM CURRENT USE OF INSULIN (H): Primary | ICD-10-CM

## 2023-08-03 LAB
ALBUMIN SERPL BCG-MCNC: 4.1 G/DL (ref 3.5–5.2)
ALP SERPL-CCNC: 40 U/L (ref 35–104)
ALT SERPL W P-5'-P-CCNC: 15 U/L (ref 0–50)
ANION GAP SERPL CALCULATED.3IONS-SCNC: 12 MMOL/L (ref 7–15)
AST SERPL W P-5'-P-CCNC: 19 U/L (ref 0–45)
BILIRUB SERPL-MCNC: 1.8 MG/DL
BUN SERPL-MCNC: 11.5 MG/DL (ref 6–20)
CALCIUM SERPL-MCNC: 9.3 MG/DL (ref 8.6–10)
CHLORIDE SERPL-SCNC: 103 MMOL/L (ref 98–107)
CHOLEST SERPL-MCNC: 112 MG/DL
CREAT SERPL-MCNC: 0.77 MG/DL (ref 0.51–0.95)
CREAT UR-MCNC: 191 MG/DL
DEPRECATED HCO3 PLAS-SCNC: 23 MMOL/L (ref 22–29)
GFR SERPL CREATININE-BSD FRML MDRD: >90 ML/MIN/1.73M2
GLUCOSE SERPL-MCNC: 125 MG/DL (ref 70–99)
HBA1C MFR BLD: 6.7 % (ref 0–5.6)
HDLC SERPL-MCNC: 38 MG/DL
LDLC SERPL CALC-MCNC: 45 MG/DL
MICROALBUMIN UR-MCNC: <12 MG/L
MICROALBUMIN/CREAT UR: NORMAL MG/G{CREAT}
NONHDLC SERPL-MCNC: 74 MG/DL
POTASSIUM SERPL-SCNC: 3.9 MMOL/L (ref 3.4–5.3)
PROT SERPL-MCNC: 7 G/DL (ref 6.4–8.3)
SODIUM SERPL-SCNC: 138 MMOL/L (ref 136–145)
TRIGL SERPL-MCNC: 146 MG/DL
TSH SERPL DL<=0.005 MIU/L-ACNC: 4.02 UIU/ML (ref 0.3–4.2)

## 2023-08-03 PROCEDURE — 82570 ASSAY OF URINE CREATININE: CPT | Performed by: FAMILY MEDICINE

## 2023-08-03 PROCEDURE — 80053 COMPREHEN METABOLIC PANEL: CPT | Performed by: FAMILY MEDICINE

## 2023-08-03 PROCEDURE — 83036 HEMOGLOBIN GLYCOSYLATED A1C: CPT | Performed by: FAMILY MEDICINE

## 2023-08-03 PROCEDURE — 80061 LIPID PANEL: CPT | Performed by: FAMILY MEDICINE

## 2023-08-03 PROCEDURE — 82043 UR ALBUMIN QUANTITATIVE: CPT | Performed by: FAMILY MEDICINE

## 2023-08-03 PROCEDURE — 99214 OFFICE O/P EST MOD 30 MIN: CPT | Performed by: FAMILY MEDICINE

## 2023-08-03 PROCEDURE — 84443 ASSAY THYROID STIM HORMONE: CPT | Performed by: FAMILY MEDICINE

## 2023-08-03 PROCEDURE — 36415 COLL VENOUS BLD VENIPUNCTURE: CPT | Performed by: FAMILY MEDICINE

## 2023-08-03 RX ORDER — METFORMIN HCL 500 MG
1000 TABLET, EXTENDED RELEASE 24 HR ORAL 2 TIMES DAILY WITH MEALS
Qty: 360 TABLET | Refills: 3 | Status: SHIPPED | OUTPATIENT
Start: 2023-08-03 | End: 2023-08-03

## 2023-08-03 RX ORDER — METFORMIN HCL 500 MG
500 TABLET, EXTENDED RELEASE 24 HR ORAL 2 TIMES DAILY WITH MEALS
Qty: 180 TABLET | Refills: 3 | Status: SHIPPED | OUTPATIENT
Start: 2023-08-03 | End: 2024-02-23

## 2023-08-03 NOTE — PROGRESS NOTES
"  Assessment & Plan     (E11.9) Type 2 diabetes mellitus without complication, without long-term current use of insulin (H)  (primary encounter diagnosis)  Comment: A1c 6.7 diabetes good control. Pt takes medication and sometimes misses the medication. Home bs monitor sometimes 2 hours after meal > 200. Non smoker. Strong family history of DM: her grandparents and parents.   Plan: Albumin Random Urine Quantitative with Creat         Ratio, HEMOGLOBIN A1C, Lipid panel reflex to         direct LDL Fasting, Comprehensive metabolic         panel (BMP + Alb, Alk Phos, ALT, AST, Total.         Bili, TP), TSH with free T4 reflex, Continuous         Blood Gluc  (FREESTYLE JOSE 2 READER)         BLESSING, Continuous Blood Gluc Sensor (FREESTYLE         JOSE 2 SENSOR) MISC, metFORMIN (GLUCOPHAGE XR)        500 MG 24 hr tablet, DISCONTINUED: metFORMIN         (GLUCOPHAGE XR) 500 MG 24 hr tablet         We addressed dm diet control, exercise, foot care and eye care. Strongly advise to take medication as instructed. Will change to metformin XR.     (E66.01) Morbid obesity (H)  Comment: bmi 42 with dm. Failed to improve.   Plan: strongly advise diet control and exercise to loss weight. Weight loss will help with dm control.         BMI:   Estimated body mass index is 42.45 kg/m  as calculated from the following:    Height as of this encounter: 1.676 m (5' 6\").    Weight as of this encounter: 119.3 kg (263 lb).   Weight management plan: Discussed healthy diet and exercise guidelines    See Patient Instructions    Barbara Marie MD  [facility] [place]    Frank Linares is a 32 year old, presenting for the following health issues:  Medication Update (Fasting )      8/3/2023     6:51 AM   Additional Questions   Roomed by Gen SCOTT CMA       History of Present Illness       Diabetes:   She presents for follow up of diabetes.  She is checking home blood glucose a few times a month.   She checks blood glucose " "before meals, after meals and at bedtime.  Blood glucose is sometimes over 200 and never under 70. She is aware of hypoglycemia symptoms including shakiness.   She is concerned about other.   She is having excessive thirst.            She eats 0-1 servings of fruits and vegetables daily.She consumes 2 sweetened beverage(s) daily.She exercises with enough effort to increase her heart rate 10 to 19 minutes per day.  She exercises with enough effort to increase her heart rate 3 or less days per week. She is missing 4 dose(s) of medications per week.  She is not taking prescribed medications regularly due to remembering to take.            Review of Systems   Constitutional, HEENT, cardiovascular, pulmonary, gi and gu systems are negative, except as otherwise noted.      Objective    /84   Pulse 89   Temp 98.1  F (36.7  C) (Oral)   Resp 20   Ht 1.676 m (5' 6\")   Wt 119.3 kg (263 lb)   LMP  (LMP Unknown)   SpO2 97%   Breastfeeding No   BMI 42.45 kg/m    Body mass index is 42.45 kg/m .  Physical Exam   GENERAL: healthy, alert and no distress  NECK: no adenopathy, no asymmetry, masses, or scars and thyroid normal to palpation  RESP: lungs clear to auscultation - no rales, rhonchi or wheezes  CV: regular rate and rhythm, normal S1 S2, no S3 or S4, no murmur, click or rub, no peripheral edema and peripheral pulses strong  ABDOMEN: soft, nontender, no hepatosplenomegaly, no masses and bowel sounds normal  MS: no gross musculoskeletal defects noted, no edema    Foot exam - both sides normal; no swelling, tenderness or skin or vascular lesions. Color and temperature is normal. Sensation is intact. Peripheral pulses are palpable. Toenails are normal.                "

## 2023-08-05 ENCOUNTER — HEALTH MAINTENANCE LETTER (OUTPATIENT)
Age: 33
End: 2023-08-05

## 2023-10-14 NOTE — PROGRESS NOTES
Progress Notes by Zita Solorio RD, CDE at 11/17/2017 12:00 PM     Author: Zita Solorio RD, CDE Service: -- Author Type: Diabetes Ed    Filed: 11/17/2017 12:26 PM Encounter Date: 11/17/2017 Status: Attested    : Zita Solorio RD, CDE (Diabetes Ed) Cosigner: Maggy Guerrero DO at 11/17/2017 12:29 PM    Attestation signed by Maggy Guerrero DO at 11/17/2017 12:29 PM    Reviewed and agree with A/PLiban Guerrero                  [facility] GDM Care Plan    Assessment:  Follow up with  Mao today, brings in bg, noted below. After dinner running high, other meals and fasting with in normal limits.  Eating toast with peanut butter for breakfast, lunch and dinner small bowl of rice with meat and vege, fruit for snacks. Did notice meal consistency helped with bg control.    Bg:  Fasting/PC   PC lunch   PC dinner  84                                   157  92                   114          150  113                 136          172  96                   130          165  88                   119          154  94                   125          162    Started 2 units Novolog at dinner.  Instructed on insulin administration using Novolog flex pen, storage, priming, expiration and site rotation. Demo' without difficulty, has given insulin before.  No PC breakfast readings, instructed to check after this meal as well.    To see MD next week where bg can be assessed, follow up with LINDAE 2 weeks.     Visit Type: GDM Individual Follow-up  Time: 15  Visit #: 3  Provider: Cesar  Provider's Diagnosis (per referral form): Gestational (648.83)  Weight:  277#  OGTT: No results found for this or any previous visit.   Estimated Date of Delivery: 4/15/18   Pregnancy #: 5  Previous GDM: Yes   Medications:   Current Outpatient Prescriptions:   ?  loratadine (CLARITIN) 10 mg tablet, Take 1 tablet (10 mg total) by mouth daily., Disp: 90 tablet, Rfl: 3  ?  prenatal vit-iron fum-folic ac (PRENATAL VITAMIN) 27 mg iron- 0.8 mg Tab, Take 1 tablet  by mouth once daily., Disp: 100 tablet, Rfl: 3    BG monitoring goals: Fasting <95; 1 hour post start of meal <140. Test 4 x per day.  Check fasting a.m. ketones: No  GDM meal pattern/carb counting taught per guidelines: Yes    Past Goals:  Nutrition: GDM meal plan met  Activity: Walking after meals when able/staying active  met  Monitoring: BG 4x/day as directed, MET      New Goals:  Nutrition: GDM meal plan   Activity: Walking after meals when able/staying active   Monitoring: BG 4x/day as directed, ketones every morning    DIABETES CARE PLAN AND EDUCATION RECORD    Gestational Diabetes Disease Process/Preconception Care/Management During Pregnancy/Postpartum:Discussed    Meter (per above goals): Competent    Nutrition Management    Weight: Assessed and Discussed  Portions/Balance: Assessed and Discussed  Carb ID/Count: Assessed and Discussed  Label Reading: Assessed and Discussed  Menu Planning: Assessed and Discussed  Dining Out: Assessed and Discussed  Physical Activity: Assessed and Discussed    Acute Complications: Prevent, Detect, Treat:    Goal Setting and Problem Solving: Assessed and Discussed  Barriers: Assessed and Discussed  Psychosocial Adjustments: Assessed and Discussed        no discrete location documentation necessary

## 2023-11-10 ENCOUNTER — OFFICE VISIT (OUTPATIENT)
Dept: FAMILY MEDICINE | Facility: CLINIC | Age: 33
End: 2023-11-10
Payer: COMMERCIAL

## 2023-11-10 VITALS
BODY MASS INDEX: 42.11 KG/M2 | RESPIRATION RATE: 18 BRPM | SYSTOLIC BLOOD PRESSURE: 122 MMHG | HEIGHT: 66 IN | DIASTOLIC BLOOD PRESSURE: 80 MMHG | HEART RATE: 78 BPM | OXYGEN SATURATION: 97 % | WEIGHT: 262 LBS | TEMPERATURE: 98.7 F

## 2023-11-10 DIAGNOSIS — E11.9 TYPE 2 DIABETES MELLITUS WITHOUT COMPLICATION, WITHOUT LONG-TERM CURRENT USE OF INSULIN (H): Primary | ICD-10-CM

## 2023-11-10 DIAGNOSIS — E66.01 MORBID OBESITY (H): ICD-10-CM

## 2023-11-10 PROBLEM — R73.03 PREDIABETES: Status: RESOLVED | Noted: 2021-11-23 | Resolved: 2023-11-10

## 2023-11-10 PROBLEM — O99.211 OBESITY AFFECTING PREGNANCY IN FIRST TRIMESTER: Status: RESOLVED | Noted: 2021-08-07 | Resolved: 2023-11-10

## 2023-11-10 PROBLEM — R93.89 ABNORMAL ULTRASOUND: Status: RESOLVED | Noted: 2021-11-23 | Resolved: 2023-11-10

## 2023-11-10 PROBLEM — N12 PYELONEPHRITIS: Status: RESOLVED | Noted: 2022-07-03 | Resolved: 2023-11-10

## 2023-11-10 PROBLEM — R42 LIGHTHEADEDNESS: Status: RESOLVED | Noted: 2022-07-03 | Resolved: 2023-11-10

## 2023-11-10 PROBLEM — L83 ACANTHOSIS NIGRICANS: Status: RESOLVED | Noted: 2018-04-10 | Resolved: 2023-11-10

## 2023-11-10 LAB — HBA1C MFR BLD: 6.2 % (ref 0–5.6)

## 2023-11-10 PROCEDURE — 90472 IMMUNIZATION ADMIN EACH ADD: CPT | Performed by: FAMILY MEDICINE

## 2023-11-10 PROCEDURE — 90471 IMMUNIZATION ADMIN: CPT | Performed by: FAMILY MEDICINE

## 2023-11-10 PROCEDURE — 91320 SARSCV2 VAC 30MCG TRS-SUC IM: CPT | Mod: SL | Performed by: FAMILY MEDICINE

## 2023-11-10 PROCEDURE — 90677 PCV20 VACCINE IM: CPT | Performed by: FAMILY MEDICINE

## 2023-11-10 PROCEDURE — 90651 9VHPV VACCINE 2/3 DOSE IM: CPT | Performed by: FAMILY MEDICINE

## 2023-11-10 PROCEDURE — 83036 HEMOGLOBIN GLYCOSYLATED A1C: CPT | Performed by: FAMILY MEDICINE

## 2023-11-10 PROCEDURE — 90686 IIV4 VACC NO PRSV 0.5 ML IM: CPT | Performed by: FAMILY MEDICINE

## 2023-11-10 PROCEDURE — 90480 ADMN SARSCOV2 VAC 1/ONLY CMP: CPT | Performed by: FAMILY MEDICINE

## 2023-11-10 PROCEDURE — 99214 OFFICE O/P EST MOD 30 MIN: CPT | Mod: 25 | Performed by: FAMILY MEDICINE

## 2023-11-10 PROCEDURE — 36415 COLL VENOUS BLD VENIPUNCTURE: CPT | Performed by: FAMILY MEDICINE

## 2023-11-10 NOTE — PROGRESS NOTES
Assessment & Plan     (E11.9) Type 2 diabetes mellitus without complication, without long-term current use of insulin (H)  (primary encounter diagnosis)  Comment: A1c 6.2  diabetes good control. Home bs fasting around 100-120. She takes medication as instructed. She has obesity and bmi 42, she is doing exercise diet control. Non smoker.   Plan: HEMOGLOBIN A1C, Semaglutide-Weight Management         (WEGOVY) 0.25 MG/0.5ML pen, Semaglutide-Weight         Management (WEGOVY) 0.5 MG/0.5ML pen,         Semaglutide-Weight Management (WEGOVY) 1         MG/0.5ML pen        We discussed diet control, exercise, foot care and eye care. Advise to loss weight that will help with DM control. Will start Wegovy injection. Side effect addressed. Follow-up in 3 month.     (E66.01) Morbid obesity (H)  Comment: bmi 42 and failed to improve.   Plan: diet control and regular exercise. Will start wegovy that hopeful will help her to loss weigh that will  help DM control.           See Patient Instructions    Barbara Marie MD  [facility] JAIME Linares is a 32 year old, presenting for the following health issues:  Recheck Medication (DM medication management. Pt reports she is interested in possibly starting a GLP-1 medication. )      11/10/2023     8:57 AM   Additional Questions   Roomed by Tran Ring   Accompanied by none       History of Present Illness       Diabetes:   She presents for follow up of diabetes.  She is checking home blood glucose one time daily.   She checks blood glucose before meals and after meals.  Blood glucose is sometimes over 200 and never under 70. She is aware of hypoglycemia symptoms including none.   She is concerned about other.    She is not experiencing numbness or burning in feet, excessive thirst, blurry vision, weight changes or redness, sores or blisters on feet.           She eats 2-3 servings of fruits and vegetables daily.She consumes 1 sweetened beverage(s)  "daily.She exercises with enough effort to increase her heart rate 30 to 60 minutes per day.  She exercises with enough effort to increase her heart rate 3 or less days per week.   She is not taking prescribed medications regularly due to remembering to take.            Review of Systems   Constitutional, HEENT, cardiovascular, pulmonary, gi and gu systems are negative, except as otherwise noted.      Objective    /80 (BP Location: Left arm, Patient Position: Sitting, Cuff Size: Adult Regular)   Pulse 78   Temp 98.7  F (37.1  C) (Oral)   Resp 18   Ht 1.676 m (5' 6\")   Wt 118.8 kg (262 lb)   LMP  (LMP Unknown)   SpO2 97%   BMI 42.29 kg/m    Body mass index is 42.29 kg/m .  Physical Exam   GENERAL: healthy, alert and no distress  NECK: no adenopathy, no asymmetry, masses, or scars and thyroid normal to palpation  RESP: lungs clear to auscultation - no rales, rhonchi or wheezes  CV: regular rate and rhythm, normal S1 S2, no S3 or S4, no murmur, click or rub, no peripheral edema and peripheral pulses strong  ABDOMEN: soft, nontender, no hepatosplenomegaly, no masses and bowel sounds normal  MS: no gross musculoskeletal defects noted, no edema                 "

## 2023-11-20 ENCOUNTER — TELEPHONE (OUTPATIENT)
Dept: FAMILY MEDICINE | Facility: CLINIC | Age: 33
End: 2023-11-20
Payer: COMMERCIAL

## 2023-11-20 DIAGNOSIS — E11.9 TYPE 2 DIABETES MELLITUS WITHOUT COMPLICATION, WITHOUT LONG-TERM CURRENT USE OF INSULIN (H): Primary | ICD-10-CM

## 2023-11-20 NOTE — TELEPHONE ENCOUNTER
General Call      Reason for Call:  change of medication    What are your questions or concerns:  patients states she cannot get new Rx of wegovy as it is backordered, but has a pharmacy where she can get Monjauro, patient requesting medication to be switched if possible    Pharmacy: [facility] [place] (has the monjauro-states they have the 2.5)    Date of last appointment with provider: 11/10/23    Could we send this information to you in Rexter or would you prefer to receive a phone call?:   Patient would like to be contacted via Rexter

## 2023-11-29 ENCOUNTER — TELEPHONE (OUTPATIENT)
Dept: FAMILY MEDICINE | Facility: CLINIC | Age: 33
End: 2023-11-29

## 2023-11-29 RX ORDER — TIRZEPATIDE 7.5 MG/.5ML
7.5 INJECTION, SOLUTION SUBCUTANEOUS
Qty: 2 ML | Refills: 0 | Status: SHIPPED | OUTPATIENT
Start: 2024-01-26 | End: 2024-02-23

## 2023-11-29 RX ORDER — TIRZEPATIDE 2.5 MG/.5ML
2.5 INJECTION, SOLUTION SUBCUTANEOUS
Qty: 2 ML | Refills: 0 | Status: SHIPPED | OUTPATIENT
Start: 2023-11-29 | End: 2023-12-27

## 2023-11-29 RX ORDER — TIRZEPATIDE 5 MG/.5ML
5 INJECTION, SOLUTION SUBCUTANEOUS
Qty: 2 ML | Refills: 0 | Status: SHIPPED | OUTPATIENT
Start: 2023-12-28 | End: 2024-01-25

## 2023-11-29 NOTE — TELEPHONE ENCOUNTER
Prior Authorization Request  Who s requesting:  patient  Pharmacy Name and Location: [facility] #[id number] [address] AT [facility] [address]   Medication Name: tirzepatide (MOUNJARO) 2.5 MG/0.5ML pen   [facility]  Insurance Member ID Number:  [id number]   Informed patient that prior authorizations can take up to 10 business days for response:   No    Patient states that provider will need to add diabetes dx to PA

## 2023-11-29 NOTE — TELEPHONE ENCOUNTER
Please inform pt that I ordered 3 month Mounjaro to her pharmacy. Advise to follow-up in 3 month.     Barbara Marie MD on 11/29/2023 at 7:54 AM;

## 2023-12-01 NOTE — TELEPHONE ENCOUNTER
PA Initiation    Medication: TIRZEPATIDE 2.5 MG/0.5ML SC SOPN  Insurance Company: [employer] ([facility]) - Phone [phone] Fax [phone]  Pharmacy Filling the Rx: [facility] #[id number] [address] [place] [facility] [place] [address]  Filling Pharmacy Phone: [phone]  Filling Pharmacy Fax:    Start Date: 12/1/2023

## 2023-12-08 NOTE — TELEPHONE ENCOUNTER
PRIOR AUTHORIZATION DENIED    Medication: TIRZEPATIDE 2.5 MG/0.5ML SC SOPN  Insurance Company: [employer] ([facility]) - Phone [phone] Fax [phone]  Denial Date: 12/1/2023  Denial Reason(s):     Appeal Information:     Patient Notified: No

## 2024-01-18 ENCOUNTER — LAB (OUTPATIENT)
Dept: LAB | Facility: CLINIC | Age: 34
End: 2024-01-18
Payer: COMMERCIAL

## 2024-01-18 ENCOUNTER — E-VISIT (OUTPATIENT)
Dept: URGENT CARE | Facility: CLINIC | Age: 34
End: 2024-01-18
Payer: COMMERCIAL

## 2024-01-18 DIAGNOSIS — N30.01 ACUTE CYSTITIS WITH HEMATURIA: Primary | ICD-10-CM

## 2024-01-18 DIAGNOSIS — R35.0 URINARY FREQUENCY: ICD-10-CM

## 2024-01-18 LAB
ALBUMIN UR-MCNC: 100 MG/DL
APPEARANCE UR: CLEAR
BACTERIA #/AREA URNS HPF: ABNORMAL /HPF
BILIRUB UR QL STRIP: NEGATIVE
COLOR UR AUTO: YELLOW
GLUCOSE UR STRIP-MCNC: NEGATIVE MG/DL
HGB UR QL STRIP: ABNORMAL
KETONES UR STRIP-MCNC: NEGATIVE MG/DL
LEUKOCYTE ESTERASE UR QL STRIP: ABNORMAL
NITRATE UR QL: NEGATIVE
PH UR STRIP: 5.5 [PH] (ref 5–8)
RBC #/AREA URNS AUTO: ABNORMAL /HPF
SP GR UR STRIP: >=1.03 (ref 1–1.03)
SQUAMOUS #/AREA URNS AUTO: ABNORMAL /LPF
UROBILINOGEN UR STRIP-ACNC: 0.2 E.U./DL
WBC #/AREA URNS AUTO: ABNORMAL /HPF
WBC CLUMPS #/AREA URNS HPF: PRESENT /HPF

## 2024-01-18 PROCEDURE — 81001 URINALYSIS AUTO W/SCOPE: CPT

## 2024-01-18 PROCEDURE — 87086 URINE CULTURE/COLONY COUNT: CPT

## 2024-01-18 PROCEDURE — 99207 PR NON-BILLABLE SERV PER CHARTING: CPT | Performed by: PREVENTIVE MEDICINE

## 2024-01-18 PROCEDURE — 87186 SC STD MICRODIL/AGAR DIL: CPT

## 2024-01-18 RX ORDER — SULFAMETHOXAZOLE/TRIMETHOPRIM 800-160 MG
1 TABLET ORAL 2 TIMES DAILY
Qty: 6 TABLET | Refills: 0 | Status: SHIPPED | OUTPATIENT
Start: 2024-01-18 | End: 2024-01-21

## 2024-01-18 NOTE — PATIENT INSTRUCTIONS
Dear Vijay Betancur,     After reviewing your responses, I would like you to come in for a urine test to make sure we treat you correctly. This urine test is to evaluate you for a possible urinary tract infection, and should be scheduled for today or tomorrow. Schedule a Lab Only appointment here.     Lab appointments are not available at most locations on the weekends. If no Lab Only appointment is available, you should be seen in any of our convenient Walk-in or Urgent Care Centers, which can be found on our website here.     You will receive instructions with your results in [email or web address] once they are available.     If your symptoms worsen, you develop pain in your back or stomach, develop fevers, or are not improving in 5 days, please contact your primary care provider for an appointment or visit a Walk-in or Urgent Care Center to be seen.     Thanks again for choosing us as your health care partner,     Prudencio Olivarez MD, MD  Urinary Tract Infection (UTI) in Women: Care Instructions  Overview     A urinary tract infection, or UTI, is a general term for an infection anywhere between the kidneys and the urethra (where urine comes out). Most UTIs are bladder infections. They often cause pain or burning when you urinate.  UTIs are caused by bacteria and can be cured with antibiotics. Be sure to complete your treatment so that the infection does not get worse.  Follow-up care is a key part of your treatment and safety. Be sure to make and go to all appointments, and call your doctor if you are having problems. It's also a good idea to know your test results and keep a list of the medicines you take.  How can you care for yourself at home?  Take your antibiotics as directed. Do not stop taking them just because you feel better. You need to take the full course of antibiotics.  Drink extra water and other fluids for the next day or two. This will help make the urine less concentrated and help wash out the  "bacteria that are causing the infection. (If you have kidney, heart, or liver disease and have to limit fluids, talk with your doctor before you increase the amount of fluids you drink.)  Avoid drinks that are carbonated or have caffeine. They can irritate the bladder.  Urinate often. Try to empty your bladder each time.  To relieve pain, take a hot bath or lay a heating pad set on low over your lower belly or genital area. Never go to sleep with a heating pad in place.  To prevent UTIs  Drink plenty of water each day. This helps you urinate often, which clears bacteria from your system. (If you have kidney, heart, or liver disease and have to limit fluids, talk with your doctor before you increase the amount of fluids you drink.)  Urinate when you need to.  If you are sexually active, urinate right after you have sex.  Change sanitary pads often.  Avoid douches, bubble baths, feminine hygiene sprays, and other feminine hygiene products that have deodorants.  After going to the bathroom, wipe from front to back.  When should you call for help?   Call your doctor now or seek immediate medical care if:    Symptoms such as fever, chills, nausea, or vomiting get worse or appear for the first time.     You have new pain in your back just below your rib cage. This is called flank pain.     There is new blood or pus in your urine.     You have any problems with your antibiotic medicine.   Watch closely for changes in your health, and be sure to contact your doctor if:    You are not getting better after taking an antibiotic for 2 days.     Your symptoms go away but then come back.   Where can you learn more?  Go to https://www.Bantr.net/patiented  Enter K848 in the search box to learn more about \"Urinary Tract Infection (UTI) in Women: Care Instructions.\"  Current as of: February 28, 2023               Content Version: 13.8    8322-6568 [employer].   Care instructions adapted under license by your " healthcare professional. If you have questions about a medical condition or this instruction, always ask your healthcare professional. [employer] disclaims any warranty or liability for your use of this information.

## 2024-01-19 DIAGNOSIS — N39.0 URINARY TRACT INFECTION WITHOUT HEMATURIA, SITE UNSPECIFIED: Primary | ICD-10-CM

## 2024-01-19 LAB — BACTERIA UR CULT: ABNORMAL

## 2024-01-19 RX ORDER — NITROFURANTOIN 25; 75 MG/1; MG/1
100 CAPSULE ORAL 2 TIMES DAILY
Qty: 10 CAPSULE | Refills: 0 | Status: SHIPPED | OUTPATIENT
Start: 2024-01-19 | End: 2024-01-24

## 2024-01-22 ENCOUNTER — NURSE TRIAGE (OUTPATIENT)
Dept: NURSING | Facility: CLINIC | Age: 34
End: 2024-01-22

## 2024-01-22 ENCOUNTER — OFFICE VISIT (OUTPATIENT)
Dept: FAMILY MEDICINE | Facility: CLINIC | Age: 34
End: 2024-01-22
Payer: COMMERCIAL

## 2024-01-22 VITALS
HEIGHT: 66 IN | DIASTOLIC BLOOD PRESSURE: 78 MMHG | TEMPERATURE: 98.3 F | OXYGEN SATURATION: 98 % | SYSTOLIC BLOOD PRESSURE: 120 MMHG | HEART RATE: 75 BPM | WEIGHT: 262.9 LBS | RESPIRATION RATE: 18 BRPM | BODY MASS INDEX: 42.25 KG/M2

## 2024-01-22 DIAGNOSIS — N30.00 ACUTE CYSTITIS WITHOUT HEMATURIA: Primary | ICD-10-CM

## 2024-01-22 PROCEDURE — 99214 OFFICE O/P EST MOD 30 MIN: CPT

## 2024-01-22 RX ORDER — NITROFURANTOIN 25; 75 MG/1; MG/1
100 CAPSULE ORAL 2 TIMES DAILY
Qty: 10 CAPSULE | Refills: 0 | Status: SHIPPED | OUTPATIENT
Start: 2024-01-22 | End: 2024-01-27

## 2024-01-22 RX ORDER — PHENAZOPYRIDINE HYDROCHLORIDE 100 MG/1
100 TABLET, FILM COATED ORAL 3 TIMES DAILY PRN
Qty: 9 TABLET | Refills: 0 | Status: SHIPPED | OUTPATIENT
Start: 2024-01-22 | End: 2024-01-25

## 2024-01-22 ASSESSMENT — PAIN SCALES - GENERAL: PAINLEVEL: NO PAIN (0)

## 2024-01-22 NOTE — PROGRESS NOTES
Assessment & Plan     (N30.00) Acute cystitis without hematuria  (primary encounter diagnosis)  Comment: Resistant. No signs of respiratory distress, vital signs stable, and no red flag signs requiring immediate need for medical attention.  Symptoms as remaining to represent acute cystitis.  No concerns at this time for ascending infection or pyelonephritis considering lack of fever, chills, flank pain, blood in urine, abdominal pain, or nausea.  Vital signs stable.  Ongoing symptoms due to the fact that patient did not  the Macrobid and begin taking it, she was not aware of this change.  Will do UA/UC results with patient and talked her through reasoning behind changing antibiotic coverage, and why she was having persistent symptoms despite having taken her entire course of Bactrim.  Will represcribe the Macrobid and send it to the pharmacy that works better for the patient.  Added on Pyridium considering the significance of dysuria.  Educated patient to take this course of medication, and follow-up after this time if symptoms worsen or do not improve.  Would likely consider repeat UA UC at that time.  Plan: nitroFURantoin macrocrystal-monohydrate         (MACROBID) 100 MG capsule, phenazopyridine         (PYRIDIUM) 100 MG tablet      Review of the result(s) of each unique test - UA/UC  Independent interpretation of a test performed by another physician/other qualified health care professional (not separately reported) - UA/UC  Discussion of management or test interpretation with external physician/other qualified healthcare professional/appropriate source - UA/UC  Prescription drug management  I spent a total of 12 minutes on the day of the visit.   Time spent by me doing chart review, history and exam, documentation and further activities per the note      FUTURE APPOINTMENTS:       - Follow-up visit in 5 days if symptoms worsen or do not improve       - Follow-up for annual visit or as needed  Patient  Instructions   Your symptoms and labs suggest a UTI. I have prescribed Macrobid to treat this. Please take this twice daily for 5 days. A urine culture is in process, and usually takes around 48 hours. We will call you if the results indicate we need to change your antibiotics.     I have also prescribed a medication called Pyridium.  This medication will help with the burning sensation that you are having when urinating.  You will take this 3 times a day for just 3 days.  This medication will likely turn your urine orange, so do not be alarmed if you note this change.  Urinary discoloration should go away shortly after you are done taking the medication.      If you are having worsened symptoms, fever, abdominal pain, back pain, please go to ER right away.    Urinary Tract Infection (UTI) in Women: Care Instructions  Overview     A urinary tract infection, or UTI, is a general term for an infection anywhere between the kidneys and the urethra (where urine comes out). Most UTIs are bladder infections. They often cause pain or burning when you urinate.  UTIs are caused by bacteria and can be cured with antibiotics. Be sure to complete your treatment so that the infection does not get worse.  Follow-up care is a key part of your treatment and safety. Be sure to make and go to all appointments, and call your doctor if you are having problems. It's also a good idea to know your test results and keep a list of the medicines you take.  How can you care for yourself at home?  Take your antibiotics as directed. Do not stop taking them just because you feel better. You need to take the full course of antibiotics.  Drink extra water and other fluids for the next day or two. This will help make the urine less concentrated and help wash out the bacteria that are causing the infection. (If you have kidney, heart, or liver disease and have to limit fluids, talk with your doctor before you increase the amount of fluids you  "drink.)  Avoid drinks that are carbonated or have caffeine. They can irritate the bladder.  Urinate often. Try to empty your bladder each time.  To relieve pain, take a hot bath or lay a heating pad set on low over your lower belly or genital area. Never go to sleep with a heating pad in place.  To prevent UTIs  Drink plenty of water each day. This helps you urinate often, which clears bacteria from your system. (If you have kidney, heart, or liver disease and have to limit fluids, talk with your doctor before you increase the amount of fluids you drink.)  Urinate when you need to.  If you are sexually active, urinate right after you have sex.  Change sanitary pads often.  Avoid douches, bubble baths, feminine hygiene sprays, and other feminine hygiene products that have deodorants.  After going to the bathroom, wipe from front to back.  When should you call for help?   Call your doctor now or seek immediate medical care if:    Symptoms such as fever, chills, nausea, or vomiting get worse or appear for the first time.     You have new pain in your back just below your rib cage. This is called flank pain.     There is new blood or pus in your urine.     You have any problems with your antibiotic medicine.   Watch closely for changes in your health, and be sure to contact your doctor if:    You are not getting better after taking an antibiotic for 2 days.     Your symptoms go away but then come back.   Where can you learn more?  Go to https://www.TripsByTips.net/patiented  Enter K848 in the search box to learn more about \"Urinary Tract Infection (UTI) in Women: Care Instructions.\"  Current as of: March 1, 2023               Content Version: 13.7    [id number] [employer].   Care instructions adapted under license by your healthcare professional. If you have questions about a medical condition or this instruction, always ask your healthcare professional. [employer] disclaims any warranty or " liability for your use of this information.            Subjective   Vijay is a 33 year old, presenting for the following health issues:  Follow Up and Recheck Medication (Pt reports that she is here to discuss follow up of UTI.)      1/22/2024     9:26 AM   Additional Questions   Roomed by trent   Accompanied by gisele         1/22/2024     9:26 AM   Patient Reported Additional Medications   Patient reports taking the following new medications none     History of Present Illness       Reason for visit:  Uti    She eats 2-3 servings of fruits and vegetables daily.She consumes 1 sweetened beverage(s) daily.She exercises with enough effort to increase her heart rate 20 to 29 minutes per day.  She exercises with enough effort to increase her heart rate 4 days per week.   She is taking medications regularly.  Vijay is a 33-year-old female with a past medical history significant for type 2 diabetes who presents today with concerns for persistent UTI symptoms.  Patient reports that on the evening of January 17 she began having some dysuria and frequency with urination.  She notes that by the next day symptoms had worsened, so she was seen via e-visit at this time.  Patient reported to the lab for UA, which did note a urinary tract infection, and she was prescribed a course of Bactrim.  Patient reports that she began taking the Bactrim as prescribed on January 18.  By January 19, the patient's urine culture had come back noting that her bacteria was resistant to Bactrim.  At this time the provider switched her antibiotic coverage to nitrofurantoin which was noted to be susceptible on urine culture.  This was sent to patient's pharmacy, but patient was not aware that this change has been made.  For this reason, patient did not  the nitrofurantoin and continue to take the Bactrim as prescribed throughout the full course of the treatment plan.  Patient reports today noting that her UTI symptoms have worsened.  She notes  "that symptoms seems to be more significant last night, she was developing increased burning with urination and urinary frequency.  Patient denies any fever, chills, nausea, vomiting, flank pain, abdominal pain, or blood in her urine.         Review of Systems  Constitutional, HEENT, cardiovascular, pulmonary, gi and gu systems are negative, except as otherwise noted.      Objective    /78 (BP Location: Left arm, Patient Position: Sitting, Cuff Size: Adult Large)   Pulse 75   Temp 98.3  F (36.8  C) (Tympanic)   Resp 18   Ht 1.676 m (5' 6\")   Wt 119.3 kg (262 lb 14.4 oz)   LMP  (LMP Unknown)   SpO2 98%   Breastfeeding No   BMI 42.43 kg/m    Body mass index is 42.43 kg/m .  Physical Exam   GENERAL: healthy, alert and no distress  NECK: no adenopathy, no asymmetry, masses, or scars  RESP: Easy rate, depth, and effort of breathing.  No visible use of accessory muscles.  No rapid respiratory rate or increased work of breathing appreciated.  CV: peripheral edema, clubbing, or cyanosis  MS: no gross musculoskeletal defects noted, no edema  NEURO: Normal strength and tone, mentation intact and speech normal      Jenny Oden DNP FNP-C  Family Nurse Practitioner - Same Day Provider  [facility] [place]          Signed Electronically by: MEERA Campo CNP    "

## 2024-01-22 NOTE — PATIENT INSTRUCTIONS
Your symptoms and labs suggest a UTI. I have prescribed Macrobid to treat this. Please take this twice daily for 5 days. A urine culture is in process, and usually takes around 48 hours. We will call you if the results indicate we need to change your antibiotics.     I have also prescribed a medication called Pyridium.  This medication will help with the burning sensation that you are having when urinating.  You will take this 3 times a day for just 3 days.  This medication will likely turn your urine orange, so do not be alarmed if you note this change.  Urinary discoloration should go away shortly after you are done taking the medication.      If you are having worsened symptoms, fever, abdominal pain, back pain, please go to ER right away.    Urinary Tract Infection (UTI) in Women: Care Instructions  Overview     A urinary tract infection, or UTI, is a general term for an infection anywhere between the kidneys and the urethra (where urine comes out). Most UTIs are bladder infections. They often cause pain or burning when you urinate.  UTIs are caused by bacteria and can be cured with antibiotics. Be sure to complete your treatment so that the infection does not get worse.  Follow-up care is a key part of your treatment and safety. Be sure to make and go to all appointments, and call your doctor if you are having problems. It's also a good idea to know your test results and keep a list of the medicines you take.  How can you care for yourself at home?  Take your antibiotics as directed. Do not stop taking them just because you feel better. You need to take the full course of antibiotics.  Drink extra water and other fluids for the next day or two. This will help make the urine less concentrated and help wash out the bacteria that are causing the infection. (If you have kidney, heart, or liver disease and have to limit fluids, talk with your doctor before you increase the amount of fluids you drink.)  Avoid drinks  "that are carbonated or have caffeine. They can irritate the bladder.  Urinate often. Try to empty your bladder each time.  To relieve pain, take a hot bath or lay a heating pad set on low over your lower belly or genital area. Never go to sleep with a heating pad in place.  To prevent UTIs  Drink plenty of water each day. This helps you urinate often, which clears bacteria from your system. (If you have kidney, heart, or liver disease and have to limit fluids, talk with your doctor before you increase the amount of fluids you drink.)  Urinate when you need to.  If you are sexually active, urinate right after you have sex.  Change sanitary pads often.  Avoid douches, bubble baths, feminine hygiene sprays, and other feminine hygiene products that have deodorants.  After going to the bathroom, wipe from front to back.  When should you call for help?   Call your doctor now or seek immediate medical care if:    Symptoms such as fever, chills, nausea, or vomiting get worse or appear for the first time.     You have new pain in your back just below your rib cage. This is called flank pain.     There is new blood or pus in your urine.     You have any problems with your antibiotic medicine.   Watch closely for changes in your health, and be sure to contact your doctor if:    You are not getting better after taking an antibiotic for 2 days.     Your symptoms go away but then come back.   Where can you learn more?  Go to https://www.[employer].net/patiented  Enter K848 in the search box to learn more about \"Urinary Tract Infection (UTI) in Women: Care Instructions.\"  Current as of: March 1, 2023               Content Version: 13.7    [id number] [employer].   Care instructions adapted under license by your healthcare professional. If you have questions about a medical condition or this instruction, always ask your healthcare professional. [employer] disclaims any warranty or liability for your use of " this information.

## 2024-01-22 NOTE — TELEPHONE ENCOUNTER
Nurse Triage SBAR    Is this a 2nd Level Triage? NO    Situation: UTI symptoms returning after completing antibiotic  Consent: not needed    Background: Jeremaih last week 1/18/24 and finished a course of Bactrim (3 days)    Assessment:   Urgency and pain came back gradually yesterday  Indicates there was a period of time where her UTI symptoms went away  Rating pain 3-4/10   Pain occurs with every urination  No fever or low back pain     Protocol Recommended Disposition:       Recommendation: Advised to be seen in the clinic for follow up - reviewed additional care advice with patient and she verbalizes understanding. Assisted in connecting with scheduling. If no available visits can be seen in [facility]. Declines additional questions.       Scheduling    Does the patient meet one of the following criteria for ADS visit consideration? 16+ years old, with an [facility] PCP     TIP  Providers, please consider if this condition is appropriate for management at one of our Acute and Diagnostic Services sites.     If patient is a good candidate, please use dotphrase <dot>triageresponse and select Refer to ADS to document.      Keren Garcia RN 8:14 AM 1/22/2024  Reason for Disposition   Painful urination AND EITHER frequency or urgency    Additional Information   Negative: Shock suspected (e.g., cold/pale/clammy skin, too weak to stand, low BP, rapid pulse)   Negative: Sounds like a life-threatening emergency to the triager   Negative: Taking antibiotic for urinary tract infection (UTI)   Negative: Unable to urinate (or only a few drops) and bladder feels very full   Negative: Vomiting   Negative: Patient sounds very sick or weak to the triager   Negative: SEVERE pain with urination   Negative: Fever > 100.4 F (38.0 C)   Negative: Side (flank) or lower back pain present   Negative: Taking antibiotic > 24 hours for UTI and fever persists   Negative: Taking antibiotic > 3 days for UTI and painful urination not improved   Negative:  Unusual vaginal discharge   Negative: > 2 UTIs in last year   Negative: Patient is worried they have a sexually transmitted infection (STI)   Negative: Age > 50 years   Negative: Possibility of pregnancy    Protocols used: Urination Pain - Female-A-[place]

## 2024-02-23 ENCOUNTER — OFFICE VISIT (OUTPATIENT)
Dept: FAMILY MEDICINE | Facility: CLINIC | Age: 34
End: 2024-02-23
Payer: COMMERCIAL

## 2024-02-23 VITALS
HEIGHT: 66 IN | SYSTOLIC BLOOD PRESSURE: 122 MMHG | TEMPERATURE: 98.7 F | RESPIRATION RATE: 12 BRPM | BODY MASS INDEX: 42.17 KG/M2 | HEART RATE: 85 BPM | WEIGHT: 262.4 LBS | DIASTOLIC BLOOD PRESSURE: 82 MMHG | OXYGEN SATURATION: 95 %

## 2024-02-23 DIAGNOSIS — E66.01 CLASS 3 SEVERE OBESITY WITHOUT SERIOUS COMORBIDITY WITH BODY MASS INDEX (BMI) OF 40.0 TO 44.9 IN ADULT, UNSPECIFIED OBESITY TYPE (H): ICD-10-CM

## 2024-02-23 DIAGNOSIS — Z00.00 ROUTINE GENERAL MEDICAL EXAMINATION AT A HEALTH CARE FACILITY: Primary | ICD-10-CM

## 2024-02-23 DIAGNOSIS — E66.813 CLASS 3 SEVERE OBESITY WITHOUT SERIOUS COMORBIDITY WITH BODY MASS INDEX (BMI) OF 40.0 TO 44.9 IN ADULT, UNSPECIFIED OBESITY TYPE (H): ICD-10-CM

## 2024-02-23 DIAGNOSIS — E11.9 TYPE 2 DIABETES MELLITUS WITHOUT COMPLICATION, WITHOUT LONG-TERM CURRENT USE OF INSULIN (H): ICD-10-CM

## 2024-02-23 DIAGNOSIS — Z12.4 CERVICAL CANCER SCREENING: ICD-10-CM

## 2024-02-23 LAB
ALBUMIN SERPL BCG-MCNC: 4.2 G/DL (ref 3.5–5.2)
ALP SERPL-CCNC: 42 U/L (ref 40–150)
ALT SERPL W P-5'-P-CCNC: 21 U/L (ref 0–50)
ANION GAP SERPL CALCULATED.3IONS-SCNC: 8 MMOL/L (ref 7–15)
AST SERPL W P-5'-P-CCNC: 21 U/L (ref 0–45)
BILIRUB SERPL-MCNC: 1.8 MG/DL
BUN SERPL-MCNC: 10.6 MG/DL (ref 6–20)
CALCIUM SERPL-MCNC: 8.8 MG/DL (ref 8.6–10)
CHLORIDE SERPL-SCNC: 104 MMOL/L (ref 98–107)
CHOLEST SERPL-MCNC: 135 MG/DL
CREAT SERPL-MCNC: 0.81 MG/DL (ref 0.51–0.95)
DEPRECATED HCO3 PLAS-SCNC: 26 MMOL/L (ref 22–29)
EGFRCR SERPLBLD CKD-EPI 2021: >90 ML/MIN/1.73M2
ERYTHROCYTE [DISTWIDTH] IN BLOOD BY AUTOMATED COUNT: 12.1 % (ref 10–15)
FASTING STATUS PATIENT QL REPORTED: NO
GLUCOSE SERPL-MCNC: 158 MG/DL (ref 70–99)
HBA1C MFR BLD: 6.5 % (ref 0–5.6)
HCT VFR BLD AUTO: 37.1 % (ref 35–47)
HDLC SERPL-MCNC: 39 MG/DL
HGB BLD-MCNC: 12.7 G/DL (ref 11.7–15.7)
LDLC SERPL CALC-MCNC: 66 MG/DL
MCH RBC QN AUTO: 29.3 PG (ref 26.5–33)
MCHC RBC AUTO-ENTMCNC: 34.2 G/DL (ref 31.5–36.5)
MCV RBC AUTO: 86 FL (ref 78–100)
NONHDLC SERPL-MCNC: 96 MG/DL
PLATELET # BLD AUTO: 219 10E3/UL (ref 150–450)
POTASSIUM SERPL-SCNC: 3.9 MMOL/L (ref 3.4–5.3)
PROT SERPL-MCNC: 7.2 G/DL (ref 6.4–8.3)
RBC # BLD AUTO: 4.34 10E6/UL (ref 3.8–5.2)
SODIUM SERPL-SCNC: 138 MMOL/L (ref 135–145)
TRIGL SERPL-MCNC: 152 MG/DL
WBC # BLD AUTO: 7 10E3/UL (ref 4–11)

## 2024-02-23 PROCEDURE — G0124 SCREEN C/V THIN LAYER BY MD: HCPCS | Performed by: PATHOLOGY

## 2024-02-23 PROCEDURE — 99213 OFFICE O/P EST LOW 20 MIN: CPT | Mod: 25 | Performed by: FAMILY MEDICINE

## 2024-02-23 PROCEDURE — 80061 LIPID PANEL: CPT | Performed by: FAMILY MEDICINE

## 2024-02-23 PROCEDURE — 80053 COMPREHEN METABOLIC PANEL: CPT | Performed by: FAMILY MEDICINE

## 2024-02-23 PROCEDURE — G0145 SCR C/V CYTO,THINLAYER,RESCR: HCPCS | Performed by: FAMILY MEDICINE

## 2024-02-23 PROCEDURE — 99395 PREV VISIT EST AGE 18-39: CPT | Performed by: FAMILY MEDICINE

## 2024-02-23 PROCEDURE — 87624 HPV HI-RISK TYP POOLED RSLT: CPT | Performed by: FAMILY MEDICINE

## 2024-02-23 PROCEDURE — 85027 COMPLETE CBC AUTOMATED: CPT | Performed by: FAMILY MEDICINE

## 2024-02-23 PROCEDURE — 83036 HEMOGLOBIN GLYCOSYLATED A1C: CPT | Performed by: FAMILY MEDICINE

## 2024-02-23 PROCEDURE — 36415 COLL VENOUS BLD VENIPUNCTURE: CPT | Performed by: FAMILY MEDICINE

## 2024-02-23 RX ORDER — METFORMIN HCL 500 MG
500 TABLET, EXTENDED RELEASE 24 HR ORAL 2 TIMES DAILY WITH MEALS
Qty: 180 TABLET | Refills: 3 | Status: SHIPPED | OUTPATIENT
Start: 2024-02-23 | End: 2024-04-09

## 2024-02-23 RX ORDER — BLOOD-GLUCOSE METER
EACH MISCELLANEOUS
Status: CANCELLED | OUTPATIENT
Start: 2024-02-23

## 2024-02-23 SDOH — HEALTH STABILITY: PHYSICAL HEALTH: ON AVERAGE, HOW MANY MINUTES DO YOU ENGAGE IN EXERCISE AT THIS LEVEL?: 60 MIN

## 2024-02-23 SDOH — HEALTH STABILITY: PHYSICAL HEALTH: ON AVERAGE, HOW MANY DAYS PER WEEK DO YOU ENGAGE IN MODERATE TO STRENUOUS EXERCISE (LIKE A BRISK WALK)?: 5 DAYS

## 2024-02-23 ASSESSMENT — SOCIAL DETERMINANTS OF HEALTH (SDOH): HOW OFTEN DO YOU GET TOGETHER WITH FRIENDS OR RELATIVES?: ONCE A WEEK

## 2024-02-23 ASSESSMENT — PAIN SCALES - GENERAL: PAINLEVEL: NO PAIN (0)

## 2024-02-23 NOTE — COMMUNITY RESOURCES LIST (ENGLISH)
02/23/2024   [facility] [employer]  N/A  For questions about this resource list or additional care needs, please contact your primary care clinic or care manager.  Phone: [phone]   Email: N/A   Address: [facility] [address] [place] [address]   Hours: N/A        Hotlines and Helplines       Hotline - Housing crisis  1  [facility] (Main Office) Distance: 15.15 miles      Phone/Virtual   [address] [place] [address]  Language: English  Hours: Mon - Sun Open 24 Hours   Phone: (933) 441-3339 Email: info@[facility] Website: http://[facility].[employer]     2  [facility] Distance: 17.08 miles      Phone/Virtual   [address] [place] [address]  Language: English  Hours: Mon - Sun Open 24 Hours   Phone: (935) 686-6350 Email: communications@[facility].org Website: https://[facility].org/oursaviourshousing/          Housing       Coordinated Entry access point  3  [facility] Distance: 10.07 miles      In-Person, Phone/Virtual   [address] [place] [address]  Language: English  Hours: Mon - Fri 8:30 AM - 4:30 PM  Fees: Free   Phone: (821) 369-2657 Email: info@[facility].org Website: https://www.[facility].org/locations/[facility]/     4  [facility] - Housing Services Distance: 17.19 miles      In-Person   [address] [place] [address]  Language: English  Hours: Mon - Fri 9:00 AM - 5:00 PM  Fees: Free   Phone: (294) 122-3383 Email: housing@[facility].org Website: http://www.[facility].org/housing     Drop-in center or day shelter  5  [facility] Distance: 9.79 miles      In-Person   [address] [place] [address]  Language: English  Hours: Mon - Fri 9:00 AM - 4:00 PM  Fees: Free   Phone: (189) 726-2264 Email: darnell@[employer].org Website: http://listening[employer].org     6  [facility] Distance: 17.31 miles       In-Person   [address] [place] [address]  Language: English, Irish, Cymraes  Hours: Mon - Sat 7:00 AM - 3:00 PM  Fees: Free, Self Pay   Phone: (596) 181-6619 Email: info@[employer] Website: https://www.[employer].org/locations/opportunity-center/     Housing search assistance  7  [facility] - Homeless Resources and Housing Information - Housing search assistance Distance: 3.08 miles      In-Person, Phone/Virtual   [address] [place] [address]  Language: English  Hours: Mon - Fri 8:00 AM - 4:30 PM  Fees: Free   Phone: (787) 731-5300 Email: leilani@[facility]. Website: https://www.[facility]./Facilities/Facility/Details/[place]     8  [facility] - Housing Resources Distance: 5.24 miles      In-Person, Phone/Virtual   [address] [place] [address]  Language: English  Hours: Mon - Fri 8:00 AM - 4:30 PM  Fees: Free   Phone: (539) 430-6078 Email: office@[employer] Website: http://[employer].org     Shelter for families  9   [facility] Distance: 21.55 miles      In-Person   [address] [place] [address]  Language: English  Hours: Mon - Fri 3:00 PM - 9:00 AM , Sat - Sun Open 24 Hours  Fees: Free   Phone: (463) 149-4498 Ext.1 Website: https://www.[facility].org/2020/07/03/emergency-family-shelter/     Shelter for individuals  10  [facility] - Homeless Resources and Housing Information - Emergency housing Distance: 3.08 miles      In-Person, Phone/Virtual   [address] [place] [address]  Language: English  Hours: Mon - Fri 8:00 AM - 4:30 PM  Fees: Free   Phone: (711) 355-7180 Email: leilani@[facility]. Website: https://www.[place]./Facilities/Facility/Details/[place]-22     11   [place] [facility] - Housing Help Distance: 9.84 miles      Phone/Virtual   [address] [place] [address]  Language: English  Hours: Mon - Fri 8:00 AM - 5:00 PM   Phone: (136) 930-6694 Email: ruth ann@[facility]. Website: https://[facility].org/          Important Numbers & Websites       Emergency Services   911  City Services   311  Poison Control   (620) 419-4391  Suicide Prevention Lifeline   (841) 493-8851 (TALK)  Child Abuse Hotline   (517) 782-7330 (4-A-Child)  Sexual Assault Hotline   (978) 266-6083 (HOPE)  National Runaway Safeline   (585) 596-4616 (RUNAWAY)  All-Options Talkline   (950) 822-7036  Substance Abuse Referral   (115) 936-1341 (HELP)

## 2024-02-23 NOTE — PROGRESS NOTES
"Preventive Care Visit  [facility]  Barbara Marie MD, Family Medicine  Feb 23, 2024    Assessment & Plan     (Z00.00) Routine general medical examination at a health care facility  (primary encounter diagnosis)  Comment: encourage annual pe and vaccine up date  Plan: Lipid panel reflex to direct LDL Non-fasting,         Comprehensive metabolic panel (BMP + Alb, Alk         Phos, ALT, AST, Total. Bili, TP), CBC with         platelets            (E11.9) Type 2 diabetes mellitus without complication, without long-term current use of insulin (H)  Comment: A1c 6.5, diabetes good control. She takes medication as instructed and no side effect. She had eye exam last year and will have eye exam this year.   Plan: HEMOGLOBIN A1C, metFORMIN (GLUCOPHAGE XR) 500         MG 24 hr tablet, blood glucose monitoring (NO         BRAND SPECIFIED) meter device kit, blood         glucose (NO BRAND SPECIFIED) test strip, blood         glucose (NO BRAND SPECIFIED) lancets standard        We discussed the GLP-1 for DM and weight loss but either insurance dose not cover or pharmacy has shortage. So she still not start yet. Advise pt to call insurance and pharmacy, once she finds out which one is available she will call me to order.     (Z12.4) Cervical cancer screening  Comment:   Plan: Pap Screen with HPV - recommended age 30 - 65         years            (E66.01,  Z68.41) Class 3 severe obesity without serious comorbidity with body mass index (BMI) of 40.0 to 44.9 in adult, unspecified obesity type (H)  Comment: bmi 42 and failed to improve. Loss weight will help with DM control.   Plan: strongly advise regular exercise and low fat diet to loss weight.     Patient has been advised of split billing requirements and indicates understanding: Yes          BMI  Estimated body mass index is 42.68 kg/m  as calculated from the following:    Height as of this encounter: 1.67 m (5' 5.75\").    Weight as of this encounter: 119 " kg (262 lb 6.4 oz).   Weight management plan: Discussed healthy diet and exercise guidelines    Counseling  Appropriate preventive services were discussed with this patient, including applicable screening as appropriate for fall prevention, nutrition, physical activity, Tobacco-use cessation, weight loss and cognition.  Checklist reviewing preventive services available has been given to the patient.  Reviewed patient's diet, addressing concerns and/or questions.   The patient was instructed to see the dentist every 6 months.   She is at risk for psychosocial distress and has been provided with information to reduce risk.       See Patient Instructions    Subjective   Vijay is a 33 year old, presenting for the following:  Physical (Annual Physical. Due for pap- willing to do today. ) and Referral (Pt is concerned she has IBS. Looking to have a colonoscopy. )        2/23/2024     1:20 PM   Additional Questions   Roomed by Ena KENNEDY CMA        Health Care Directive  Patient does not have a Health Care Directive or Living Will: Patient states has Advance Directive and will bring in a copy to clinic.    HPI    Diabetes Follow-up    How often are you checking your blood sugar? A few times a week  What time of day are you checking your blood sugars (select all that apply)?  Before meals  Have you had any blood sugars above 200?  No  Have you had any blood sugars below 70?  No  What symptoms do you notice when your blood sugar is low?  None  What concerns do you have today about your diabetes? None   Do you have any of these symptoms? (Select all that apply)  No numbness or tingling in feet.  No redness, sores or blisters on feet.  No complaints of excessive thirst.  No reports of blurry vision.  No significant changes to weight.  Have you had a diabetic eye exam in the last 12 months? No        BP Readings from Last 2 Encounters:   02/23/24 122/82   01/22/24 120/78     Hemoglobin A1C (%)   Date Value   02/23/2024 6.5 (H)    11/10/2023 6.2 (H)     LDL Cholesterol Calculated (mg/dL)   Date Value   08/03/2023 45   12/08/2022 40               2/23/2024   General Health   How would you rate your overall physical health? (!) POOR   Feel stress (tense, anxious, or unable to sleep) Only a little   (!) STRESS CONCERN      2/23/2024   Nutrition   Three or more servings of calcium each day? (!) NO   Diet: Low salt    Breakfast skipped   How many servings of fruit and vegetables per day? (!) 0-1   How many sweetened beverages each day? 0-1         2/23/2024   Exercise   Days per week of moderate/strenous exercise 5 days   Average minutes spent exercising at this level 60 min         2/23/2024   Social Factors   Frequency of gathering with friends or relatives Once a week   Worry food won't last until get money to buy more No   Food not last or not have enough money for food? No   Do you have housing?  No   Are you worried about losing your housing? No   Lack of transportation? No   Unable to get utilities (heat,electricity)? No   Want help with housing or utility concern? No   (!) HOUSING CONCERN PRESENT      2/23/2024   Dental   Dentist two times every year? (!) NO              Today's PHQ-2 Score:       1/22/2024     9:16 AM   PHQ-2 ( 1999 [employer])   Q1: Little interest or pleasure in doing things 0   Q2: Feeling down, depressed or hopeless 0   PHQ-2 Score 0   Q1: Little interest or pleasure in doing things Not at all   Q2: Feeling down, depressed or hopeless Not at all   PHQ-2 Score 0         2/23/2024   Substance Use   Alcohol more than 3/day or more than 7/wk No   Do you use any other substances recreationally? No     Social History     Tobacco Use    Smoking status: Never     Passive exposure: Never    Smokeless tobacco: Never   Vaping Use    Vaping Use: Never used   Substance Use Topics    Alcohol use: No     Comment: Alcoholic Drinks/day: none    Drug use: No             2/23/2024   Breast Cancer Screening   Family history of breast,  "colon, or ovarian cancer? No / Unknown      Mammogram Screening - Patient under 40 years of age: Routine Mammogram Screening not recommended.         2/23/2024   STI Screening   New sexual partner(s) since last STI/HIV test? No     History of abnormal Pap smear: NO - age 30-65 PAP every 5 years with negative HPV co-testing recommended        Latest Ref Rng & Units 6/4/2021     9:42 AM 10/8/2019    11:09 AM 12/6/2016     4:39 PM   PAP / HPV   PAP Negative for squamous intraepithelial lesion or malignancy. Negative for squamous intraepithelial lesion or malignancy  Electronically signed by Tanika Rodriguez CT (ASCP) on 6/8/2021 at 12:52 PM    Negative for squamous intraepithelial lesion or malignancy  Electronically signed by Treasure Gil CT (ASCP) on 10/9/2019 at  4:04 PM    Negative for squamous intraepithelial lesion or malignancy  Electronically signed by Chelsea Alvarado CT (ASCP) on 12/13/2016 at  2:08 PM              2/23/2024   Contraception/Family Planning   Questions about contraception or family planning No        Reviewed and updated as needed this visit by Provider   Tobacco  Allergies  Meds  Problems  Med Hx  Surg Hx  Fam Hx            Lab work is in process  Labs reviewed in EPIC      Review of Systems  Constitutional, HEENT, cardiovascular, pulmonary, GI, , musculoskeletal, neuro, skin, endocrine and psych systems are negative, except as otherwise noted.     Objective    Exam  /82 (BP Location: Left arm, Patient Position: Sitting, Cuff Size: Adult Regular)   Pulse 85   Temp 98.7  F (37.1  C) (Oral)   Resp 12   Ht 1.67 m (5' 5.75\")   Wt 119 kg (262 lb 6.4 oz)   LMP 01/01/2024 (Within Weeks)   SpO2 95%   Breastfeeding No   BMI 42.68 kg/m     Estimated body mass index is 42.68 kg/m  as calculated from the following:    Height as of this encounter: 1.67 m (5' 5.75\").    Weight as of this encounter: 119 kg (262 lb 6.4 oz).    Physical Exam  GENERAL: alert and no " distress  EYES: Eyes grossly normal to inspection, PERRL and conjunctivae and sclerae normal  HENT: ear canals and TM's normal, nose and mouth without ulcers or lesions  NECK: no adenopathy, no asymmetry, masses, or scars  RESP: lungs clear to auscultation - no rales, rhonchi or wheezes  BREAST: normal without masses, tenderness or nipple discharge and no palpable axillary masses or adenopathy  CV: regular rate and rhythm, normal S1 S2, no S3 or S4, no murmur, click or rub, no peripheral edema  ABDOMEN: soft, nontender, no hepatosplenomegaly, no masses and bowel sounds normal   (female) w/bimanual: normal female external genitalia, normal urethral meatus, normal vaginal mucosa, and normal cervix/adnexa/uterus without masses or discharge  MS: no gross musculoskeletal defects noted, no edema  SKIN: no suspicious lesions or rashes  NEURO: Normal strength and tone, mentation intact and speech normal  PSYCH: mentation appears normal, affect normal/bright      Signed Electronically by: Barbara Marie MD

## 2024-02-23 NOTE — PATIENT INSTRUCTIONS
Preventive Care Advice   This is general advice given by our system to help you stay healthy. However, your care team may have specific advice just for you. Please talk to your care team about your preventive care needs.  Nutrition  Eat 5 or more servings of fruits and vegetables each day.  Try wheat bread, brown rice and whole grain pasta (instead of white bread, rice, and pasta).  Get enough calcium and vitamin D. Check the label on foods and aim for 100% of the RDA (recommended daily allowance).  Lifestyle  Exercise at least 150 minutes each week  (30 minutes a day, 5 days a week).  Do muscle strengthening activities 2 days a week. These help control your weight and prevent disease.  No smoking.  Wear sunscreen to prevent skin cancer.  Have a dental exam and cleaning every 6 months.  Yearly exams  See your health care team every year to talk about:  Any changes in your health.  Any medicines your care team has prescribed.  Preventive care, family planning, and ways to prevent chronic diseases.  Shots (vaccines)   HPV shots (up to age 26), if you've never had them before.  Hepatitis B shots (up to age 59), if you've never had them before.  COVID-19 shot: Get this shot when it's due.  Flu shot: Get a flu shot every year.  Tetanus shot: Get a tetanus shot every 10 years.  Pneumococcal, hepatitis A, and RSV shots: Ask your care team if you need these based on your risk.  Shingles shot (for age 50 and up)  General health tests  Diabetes screening:  Starting at age 35, Get screened for diabetes at least every 3 years.  If you are younger than age 35, ask your care team if you should be screened for diabetes.  Cholesterol test: At age 39, start having a cholesterol test every 5 years, or more often if advised.  Bone density scan (DEXA): At age 50, ask your care team if you should have this scan for osteoporosis (brittle bones).  Hepatitis C: Get tested at least once in your life.  STIs (sexually transmitted  infections)  Before age 24: Ask your care team if you should be screened for STIs.  After age 24: Get screened for STIs if you're at risk. You are at risk for STIs (including HIV) if:  You are sexually active with more than one person.  You don't use condoms every time.  You or a partner was diagnosed with a sexually transmitted infection.  If you are at risk for HIV, ask about PrEP medicine to prevent HIV.  Get tested for HIV at least once in your life, whether you are at risk for HIV or not.  Cancer screening tests  Cervical cancer screening: If you have a cervix, begin getting regular cervical cancer screening tests starting at age 21.  Breast cancer scan (mammogram): If you've ever had breasts, begin having regular mammograms starting at age 40. This is a scan to check for breast cancer.  Colon cancer screening: It is important to start screening for colon cancer at age 45.  Have a colonoscopy test every 10 years (or more often if you're at risk) Or, ask your provider about stool tests like a FIT test every year or Cologuard test every 3 years.  To learn more about your testing options, visit:   https://www.[employer]/525809.pdf.  For help making a decision, visit:   https://bit.ly/de64555.  Prostate cancer screening test: If you have a prostate, ask your care team if a prostate cancer screening test (PSA) at age 55 is right for you.  Lung cancer screening: If you are a current or former smoker ages 50 to 80, ask your care team if ongoing lung cancer screenings are right for you.  For informational purposes only. Not to replace the advice of your health care provider. Copyright   2023 [facility]. All rights reserved. Clinically reviewed by the [facility]. [employer] 805951 - REV 01/24.    Learning About Stress  What is stress?     Stress is your body's response to a hard situation. Your body can have a physical, emotional, or mental response. Stress is a fact of life for  most people, and it affects everyone differently. What causes stress for you may not be stressful for someone else.  A lot of things can cause stress. You may feel stress when you go on a job interview, take a test, or run a race. This kind of short-term stress is normal and even useful. It can help you if you need to work hard or react quickly. For example, stress can help you finish an important job on time.  Long-term stress is caused by ongoing stressful situations or events. Examples of long-term stress include long-term health problems, ongoing problems at work, or conflicts in your family. Long-term stress can harm your health.  How does stress affect your health?  When you are stressed, your body responds as though you are in danger. It makes hormones that speed up your heart, make you breathe faster, and give you a burst of energy. This is called the fight-or-flight stress response. If the stress is over quickly, your body goes back to normal and no harm is done.  But if stress happens too often or lasts too long, it can have bad effects. Long-term stress can make you more likely to get sick, and it can make symptoms of some diseases worse. If you tense up when you are stressed, you may develop neck, shoulder, or low back pain. Stress is linked to high blood pressure and heart disease.  Stress also harms your emotional health. It can make you britton, tense, or depressed. Your relationships may suffer, and you may not do well at work or school.  What can you do to manage stress?  You can try these things to help manage stress:   Do something active. Exercise or activity can help reduce stress. Walking is a great way to get started. Even everyday activities such as housecleaning or yard work can help.  Try yoga or edenilson chi. These techniques combine exercise and meditation. You may need some training at first to learn them.  Do something you enjoy. For example, listen to music or go to a movie. Practice your  "hobby or do volunteer work.  Meditate. This can help you relax, because you are not worrying about what happened before or what may happen in the future.  Do guided imagery. Imagine yourself in any setting that helps you feel calm. You can use online videos, books, or a teacher to guide you.  Do breathing exercises. For example:  From a standing position, bend forward from the waist with your knees slightly bent. Let your arms dangle close to the floor.  Breathe in slowly and deeply as you return to a standing position. Roll up slowly and lift your head last.  Hold your breath for just a few seconds in the standing position.  Breathe out slowly and bend forward from the waist.  Let your feelings out. Talk, laugh, cry, and express anger when you need to. Talking with supportive friends or family, a counselor, or a patrice leader about your feelings is a healthy way to relieve stress. Avoid discussing your feelings with people who make you feel worse.  Write. It may help to write about things that are bothering you. This helps you find out how much stress you feel and what is causing it. When you know this, you can find better ways to cope.  What can you do to prevent stress?  You might try some of these things to help prevent stress:  Manage your time. This helps you find time to do the things you want and need to do.  Get enough sleep. Your body recovers from the stresses of the day while you are sleeping.  Get support. Your family, friends, and community can make a difference in how you experience stress.  Limit your news feed. Avoid or limit time on social media or news that may make you feel stressed.  Do something active. Exercise or activity can help reduce stress. Walking is a great way to get started.  Where can you learn more?  Go to https://www.healthwise.net/patiented  Enter N032 in the search box to learn more about \"Learning About Stress.\"  Current as of: February 26, 2023               Content Version: " 13.8    6545-8165 [employer].   Care instructions adapted under license by your healthcare professional. If you have questions about a medical condition or this instruction, always ask your healthcare professional. [employer] disclaims any warranty or liability for your use of this information.

## 2024-03-04 LAB
BKR LAB AP GYN ADEQUACY: ABNORMAL
BKR LAB AP GYN INTERPRETATION: ABNORMAL
BKR LAB AP HPV REFLEX: ABNORMAL
BKR LAB AP PREVIOUS ABNORMAL: ABNORMAL
PATH REPORT.COMMENTS IMP SPEC: ABNORMAL
PATH REPORT.COMMENTS IMP SPEC: ABNORMAL
PATH REPORT.RELEVANT HX SPEC: ABNORMAL

## 2024-03-05 LAB
HUMAN PAPILLOMA VIRUS 16 DNA: NEGATIVE
HUMAN PAPILLOMA VIRUS 18 DNA: NEGATIVE
HUMAN PAPILLOMA VIRUS FINAL DIAGNOSIS: NORMAL
HUMAN PAPILLOMA VIRUS OTHER HR: NEGATIVE

## 2024-03-06 ENCOUNTER — PATIENT OUTREACH (OUTPATIENT)
Dept: FAMILY MEDICINE | Facility: CLINIC | Age: 34
End: 2024-03-06
Payer: COMMERCIAL

## 2024-03-06 PROBLEM — R87.611 PAPANICOLAOU SMEAR OF CERVIX WITH ATYPICAL SQUAMOUS CELLS CANNOT EXCLUDE HIGH GRADE SQUAMOUS INTRAEPITHELIAL LESION (ASC-H): Status: ACTIVE | Noted: 2024-03-06

## 2024-03-11 ENCOUNTER — OFFICE VISIT (OUTPATIENT)
Dept: FAMILY MEDICINE | Facility: CLINIC | Age: 34
End: 2024-03-11
Payer: COMMERCIAL

## 2024-03-11 VITALS
WEIGHT: 260 LBS | TEMPERATURE: 98.4 F | DIASTOLIC BLOOD PRESSURE: 84 MMHG | RESPIRATION RATE: 14 BRPM | OXYGEN SATURATION: 98 % | HEIGHT: 66 IN | HEART RATE: 84 BPM | SYSTOLIC BLOOD PRESSURE: 126 MMHG | BODY MASS INDEX: 41.78 KG/M2

## 2024-03-11 DIAGNOSIS — E80.4 GILBERT SYNDROME: Primary | ICD-10-CM

## 2024-03-11 DIAGNOSIS — R17 ELEVATED BILIRUBIN: ICD-10-CM

## 2024-03-11 PROCEDURE — 99213 OFFICE O/P EST LOW 20 MIN: CPT | Performed by: FAMILY MEDICINE

## 2024-03-11 NOTE — PROGRESS NOTES
"  Assessment & Plan     (E80.4) Gilbert syndrome  (primary encounter diagnosis)  Comment: pt has elevated bilirubin with other liver enzymes normal. She had cholecystectomy years ago. No history of liver disease, no alcohol. No otc nsaids. Likely gilbert syndrome.   Plan: US Abdomen Limited        Will follow-up liver us. Will monitor liver function.     (R17) Elevated bilirubin  Comment: elevated bilirubin. Stable.   Plan: US Abdomen Limited               See Patient Instructions    Subjective   Vijay is a 33 year old, presenting for the following health issues:  Recheck Medication (Follow up from 2/2024 visit)      3/11/2024     5:13 PM   Additional Questions   Roomed by Tran Ring   Accompanied by none     History of Present Illness       Reason for visit:  Follow up    She eats 2-3 servings of fruits and vegetables daily.She consumes 1 sweetened beverage(s) daily.She exercises with enough effort to increase her heart rate 30 to 60 minutes per day.  She exercises with enough effort to increase her heart rate 4 days per week. She is missing 2 dose(s) of medications per week.          Review of Systems  Constitutional, HEENT, cardiovascular, pulmonary, gi and gu systems are negative, except as otherwise noted.      Objective    /84 (BP Location: Right arm, Patient Position: Sitting, Cuff Size: Adult Large)   Pulse 84   Temp 98.4  F (36.9  C) (Oral)   Resp 14   Ht 1.67 m (5' 5.75\")   Wt 117.9 kg (260 lb)   LMP 03/06/2024 (Approximate)   SpO2 98%   BMI 42.28 kg/m    Body mass index is 42.28 kg/m .  Physical Exam   GENERAL: alert and no distress  ABDOMEN: soft, nontender, no hepatosplenomegaly, no masses and bowel sounds normal           Signed Electronically by: Barbara Marie MD    "

## 2024-03-12 ENCOUNTER — TELEPHONE (OUTPATIENT)
Dept: FAMILY MEDICINE | Facility: CLINIC | Age: 34
End: 2024-03-12
Payer: COMMERCIAL

## 2024-03-20 ENCOUNTER — HOSPITAL ENCOUNTER (OUTPATIENT)
Dept: ULTRASOUND IMAGING | Facility: CLINIC | Age: 34
Discharge: HOME | End: 2024-03-20
Attending: FAMILY MEDICINE | Admitting: FAMILY MEDICINE
Payer: COMMERCIAL

## 2024-03-20 DIAGNOSIS — E80.4 GILBERT SYNDROME: ICD-10-CM

## 2024-03-20 DIAGNOSIS — R17 ELEVATED BILIRUBIN: ICD-10-CM

## 2024-03-20 PROCEDURE — 76705 ECHO EXAM OF ABDOMEN: CPT

## 2024-04-01 ENCOUNTER — OFFICE VISIT (OUTPATIENT)
Dept: FAMILY MEDICINE | Facility: CLINIC | Age: 34
End: 2024-04-01
Payer: COMMERCIAL

## 2024-04-01 VITALS
SYSTOLIC BLOOD PRESSURE: 138 MMHG | RESPIRATION RATE: 16 BRPM | HEIGHT: 66 IN | TEMPERATURE: 98.2 F | DIASTOLIC BLOOD PRESSURE: 94 MMHG | BODY MASS INDEX: 41.95 KG/M2 | HEART RATE: 89 BPM | WEIGHT: 261 LBS | OXYGEN SATURATION: 99 %

## 2024-04-01 DIAGNOSIS — R87.611 PAPANICOLAOU SMEAR OF CERVIX WITH ATYPICAL SQUAMOUS CELLS CANNOT EXCLUDE HIGH GRADE SQUAMOUS INTRAEPITHELIAL LESION (ASC-H): Primary | ICD-10-CM

## 2024-04-01 LAB — HCG UR QL: NEGATIVE

## 2024-04-01 PROCEDURE — 99207 PR NO CHARGE LOS: CPT | Performed by: STUDENT IN AN ORGANIZED HEALTH CARE EDUCATION/TRAINING PROGRAM

## 2024-04-01 PROCEDURE — 81025 URINE PREGNANCY TEST: CPT | Performed by: STUDENT IN AN ORGANIZED HEALTH CARE EDUCATION/TRAINING PROGRAM

## 2024-04-01 ASSESSMENT — PAIN SCALES - GENERAL: PAINLEVEL: NO PAIN (0)

## 2024-04-01 NOTE — PROGRESS NOTES
"  Assessment & Plan   Problem List Items Addressed This Visit       Papanicolaou smear of cervix with atypical squamous cells cannot exclude high grade squamous intraepithelial lesion (ASC-H) - Primary    Relevant Orders    HCG qualitative urine (Completed)        Patient is new to me  Was here for colposcopy for last Pap smear of ASCUS-H.  Her mom passed away from cervical cancer.  Patient herself is not a smoker.  Has had 6 children  No abnormal bleeding  I attempted the colposcopy but unfortunately, due to body habitus, was unable to see the entirety of the cervix.  Patient also has a long vaginal canal.  I did not have the appropriate equipment with me during the colposcopy.  Asked patient to come back and see me on Friday for colposcopy.  She is amenable.  I apologize for any convenience this may have caused.  I will not charge her for this visit.    Frank Linares is a 33 year old, presenting for the following health issues:  Colposcopy        4/1/2024     8:30 AM   Additional Questions   Roomed by Zofia TOVAR CMA   Accompanied by self         4/1/2024     8:30 AM   Patient Reported Additional Medications   Patient reports taking the following new medications na     Via the Health Maintenance questionnaire, the patient has reported the following services have been completed -Eye Exam, this information has been sent to the abstraction team.  HPI         Review of Systems  As per HPI       Objective    BP (!) 138/94 (BP Location: Right arm, Patient Position: Sitting, Cuff Size: Adult Large)   Pulse 89   Temp 98.2  F (36.8  C) (Oral)   Resp 16   Ht 1.676 m (5' 6\")   Wt 118.4 kg (261 lb)   LMP 03/06/2024 (Approximate)   SpO2 99%   Breastfeeding No   BMI 42.13 kg/m    Body mass index is 42.13 kg/m .  Physical Exam   On inspection, no obvious lesions, swelling or odor   Normal pubic hair growth extending to internalthigh bilaterally   Clitoral pearl intact and urethra midline   Vaginal mucosa healthy and pink "   Unable to visualize entirety of cervix.    Signed Electronically by: Michelle Jacobsen, DO

## 2024-04-01 NOTE — PATIENT INSTRUCTIONS

## 2024-04-05 ENCOUNTER — OFFICE VISIT (OUTPATIENT)
Dept: FAMILY MEDICINE | Facility: CLINIC | Age: 34
End: 2024-04-05
Payer: COMMERCIAL

## 2024-04-05 VITALS
SYSTOLIC BLOOD PRESSURE: 122 MMHG | DIASTOLIC BLOOD PRESSURE: 84 MMHG | BODY MASS INDEX: 41.14 KG/M2 | RESPIRATION RATE: 20 BRPM | HEART RATE: 75 BPM | WEIGHT: 256 LBS | TEMPERATURE: 98.8 F | OXYGEN SATURATION: 99 % | HEIGHT: 66 IN

## 2024-04-05 DIAGNOSIS — R87.611 ATYPICAL SQUAMOUS CELLS CANNOT EXCLUDE HIGH GRADE SQUAMOUS INTRAEPITHELIAL LESION ON CYTOLOGIC SMEAR OF CERVIX (ASC-H): Primary | ICD-10-CM

## 2024-04-05 LAB — HCG UR QL: NEGATIVE

## 2024-04-05 PROCEDURE — 88305 TISSUE EXAM BY PATHOLOGIST: CPT | Performed by: PATHOLOGY

## 2024-04-05 PROCEDURE — 81025 URINE PREGNANCY TEST: CPT | Performed by: STUDENT IN AN ORGANIZED HEALTH CARE EDUCATION/TRAINING PROGRAM

## 2024-04-05 PROCEDURE — 57454 BX/CURETT OF CERVIX W/SCOPE: CPT | Performed by: STUDENT IN AN ORGANIZED HEALTH CARE EDUCATION/TRAINING PROGRAM

## 2024-04-05 ASSESSMENT — PAIN SCALES - GENERAL: PAINLEVEL: NO PAIN (0)

## 2024-04-05 NOTE — PROGRESS NOTES
"Colposcopy Procedure Note    Indications: Recent pap smear showed ASC - H .  Pertinent past history includes : none.  Mom has history of cervical cancer    Procedure Details   /84 (BP Location: Right arm, Patient Position: Sitting)   Pulse 75   Temp 98.8  F (37.1  C) (Oral)   Resp 20   Ht 1.676 m (5' 6\")   Wt 116.1 kg (256 lb)   LMP 03/06/2024 (Approximate)   SpO2 99%   BMI 41.32 kg/m      Body mass index is 41.32 kg/m .    The reason for procedure is explained, and verbal informed consent is obtained.    Speculum is placed in the vagina and visualization of cervix is achieved.   Visualization of the cervix: fully visible  Visualization of the squamocolumnar junction: not fully visible    The cervix is swabbed with 3% acetic acid solution.   Any degree of whitening after application of dilute acetic acid?  Yes     Description of normal colposcopic findings:   Incomplete visualization of the SCJ.  Patient had punctations and mosaicism at the 1:00 and 5:00 area.  Acetowhite changes were noticed at 10:00, 1:00, 5:00, 7:00.  Biopsies of all 4 areas were pursued.  Given incomplete visualization of SCJ, ECC was done.  No abdominal vasculature was seen with the green filter.    The patient tolerated the procedure well.    Impression: Normal/benign; low-grade; high-grade; cancer    Plan: Specimen labelled and sent to Pathology. Post procedure instructions are reviewed.  The role of HPV in causing cervical pap smear abnormalities, dysplasia, and cancer is reviewed with the patient.   "

## 2024-04-09 DIAGNOSIS — E11.9 TYPE 2 DIABETES MELLITUS WITHOUT COMPLICATION, WITHOUT LONG-TERM CURRENT USE OF INSULIN (H): ICD-10-CM

## 2024-04-09 LAB
PATH REPORT.COMMENTS IMP SPEC: NORMAL
PATH REPORT.FINAL DX SPEC: NORMAL
PATH REPORT.GROSS SPEC: NORMAL
PATH REPORT.MICROSCOPIC SPEC OTHER STN: NORMAL
PATH REPORT.RELEVANT HX SPEC: NORMAL
PHOTO IMAGE: NORMAL

## 2024-04-09 RX ORDER — METFORMIN HCL 500 MG
500 TABLET, EXTENDED RELEASE 24 HR ORAL 2 TIMES DAILY WITH MEALS
Qty: 180 TABLET | Refills: 3 | Status: CANCELLED | OUTPATIENT
Start: 2024-04-09

## 2024-04-09 RX ORDER — METFORMIN HCL 500 MG
500 TABLET, EXTENDED RELEASE 24 HR ORAL 2 TIMES DAILY WITH MEALS
Qty: 180 TABLET | Refills: 2 | Status: SHIPPED | OUTPATIENT
Start: 2024-04-09

## 2024-04-09 NOTE — TELEPHONE ENCOUNTER
*NEW PHARM  Refill Request  Medication name: Pending Prescriptions:                       Disp   Refills    metFORMIN (GLUCOPHAGE XR) 500 MG 24 hr ta*180 ta*3            Sig: Take 1 tablet (500 mg) by mouth 2 times daily           (with meals)    Requested Pharmacy:  [facility] - [place], [place] - [address]

## 2024-04-17 ENCOUNTER — MYC MEDICAL ADVICE (OUTPATIENT)
Dept: FAMILY MEDICINE | Facility: CLINIC | Age: 34
End: 2024-04-17
Payer: COMMERCIAL

## 2024-04-24 ENCOUNTER — PATIENT OUTREACH (OUTPATIENT)
Dept: FAMILY MEDICINE | Facility: CLINIC | Age: 34
End: 2024-04-24
Payer: COMMERCIAL

## 2024-06-27 DIAGNOSIS — E11.9 TYPE 2 DIABETES MELLITUS WITHOUT COMPLICATION, WITHOUT LONG-TERM CURRENT USE OF INSULIN (H): ICD-10-CM

## 2024-07-14 ENCOUNTER — HEALTH MAINTENANCE LETTER (OUTPATIENT)
Age: 34
End: 2024-07-14

## 2024-09-27 ENCOUNTER — TELEPHONE (OUTPATIENT)
Dept: SCHEDULING | Facility: CLINIC | Age: 34
End: 2024-09-27
Payer: COMMERCIAL

## 2024-09-27 NOTE — TELEPHONE ENCOUNTER
Order/Referral Request    Who is requesting: Patient    Orders being requested: GI referral - colonoscopy    Reason service is needed/diagnosis: Routine    When are orders needed by: Asap    Has this been discussed with Provider: No    Does patient have a preference on a Group/Provider/Facility? [facility]    Does patient have an appointment scheduled?: No    Where to send orders: Fax    Could we send this information to you in "[employer] or would you prefer to receive a phone call?:   Patient would prefer a phone call   Okay to leave a detailed message?: Yes at Cell number on file:    Telephone Information:   Mobile [phone]

## 2024-09-30 NOTE — TELEPHONE ENCOUNTER
"Reason for referral/colonoscopy?    Previous note just lists \"routine\" and also \"ASAP\"    Need more info, please    "

## 2024-10-01 NOTE — TELEPHONE ENCOUNTER
Spoke with patient. H/o IBS since high school. She still has these symptoms and is using the toilet frequently throughout the day causing some red flags at work. She would like a colonoscopy to diagnose her. Aunt has Chrons disease.   Appt scheduled to discuss.

## 2024-12-01 ENCOUNTER — HEALTH MAINTENANCE LETTER (OUTPATIENT)
Age: 34
End: 2024-12-01

## 2025-01-06 ENCOUNTER — TELEPHONE (OUTPATIENT)
Dept: FAMILY MEDICINE | Facility: CLINIC | Age: 35
End: 2025-01-06
Payer: COMMERCIAL

## 2025-01-06 NOTE — TELEPHONE ENCOUNTER
Retail Pharmacy Prior Authorization Team   Phone: [phone]      CMM PIERRE: (Key: [id number])    PA Initiation    Medication: WEGOVY 0.25 MG/0.5ML SC SOAJ  Insurance Company: [employer] [place] - Phone [phone] Fax [phone]  Pharmacy Filling the Rx:    Filling Pharmacy Phone:    Filling Pharmacy Fax:    Start Date: 1/6/2025

## 2025-01-06 NOTE — TELEPHONE ENCOUNTER
Prior Authorization Retail Medication Request    Medication/Dose: Semaglutide-Weight Management (WEGOVY) 0.25 MG/0.5ML pen  Diagnosis and ICD code (if different than what is on RX):  See Chart  New/renewal/insurance change PA/secondary ins. PA:  Previously Tried and Failed:  See Chart  Rationale:  See Chart    Insurance   Primary: [facility]  Insurance ID:  [id number]    Secondary (if applicable):   Insurance ID:       Pharmacy Information (if different than what is on RX)  Name:  [email or web address]  Phone:  [phone]  Fax:[phone]  [facility] CODE: [id number]    Clinic Information  Preferred routing pool for dept communication: [facility]

## 2025-01-07 NOTE — TELEPHONE ENCOUNTER
Retail Pharmacy Prior Authorization Team   Phone: [phone]      PRIOR AUTHORIZATION DENIED    Medication: WEGOVY 0.25 MG/0.5ML SC SOAJ  Insurance Company: [employer] [place] - Phone [phone] Fax [phone]  Denial Date: 1/6/2025  Denial Reason(s):       Appeal Information: Drug exclusions cannot be appealed.  This medication will not be covered by the prescription plan for any reason. The drug is not on formulary and there are no loopholes to gaining approval.    \Patient Notified: No. The Retail [facility] PA Team does not notify of the denial outcomes as the patient often will ask what their provider will prescribe in place of the denied medication, or additional information in regards to other therapies they can take in place of the denied medication.  This is not something we can advise on in our department

## 2025-01-08 NOTE — TELEPHONE ENCOUNTER
Pt notified. NEW INSURANCE.     Prior Authorization Retail Medication Request    Medication/Dose: Semaglutide-Weight Management (WEGOVY) 0.25 MG/0.5ML pen - PA DENIED NEW INSURANCE THIS YEAR. PLEASE PROCESS WITH NEW BCBS INFO BELOW.  Diagnosis and ICD code (if different than what is on RX):    New/renewal/insurance change PA/secondary ins. PA:  Previously Tried and Failed:    Rationale:      Insurance   Primary: [employer]  Insurance ID:    [id number]    Secondary (if applicable):  Insurance ID:      Pharmacy Information (if different than what is on RX)  Name:  [facility]  Phone:    Fax:    Clinic Information  Preferred routing pool for dept communication: [facility] primary care team pool

## 2025-01-08 NOTE — TELEPHONE ENCOUNTER
I have created a new encounter for the submission to [employer].  Please see that encounter for the updates on the PA submission.

## 2025-02-24 ENCOUNTER — OFFICE VISIT (OUTPATIENT)
Dept: FAMILY MEDICINE | Facility: CLINIC | Age: 35
End: 2025-02-24
Attending: FAMILY MEDICINE
Payer: COMMERCIAL

## 2025-02-24 VITALS
HEART RATE: 94 BPM | OXYGEN SATURATION: 96 % | DIASTOLIC BLOOD PRESSURE: 84 MMHG | TEMPERATURE: 98.9 F | SYSTOLIC BLOOD PRESSURE: 120 MMHG | BODY MASS INDEX: 42.59 KG/M2 | RESPIRATION RATE: 16 BRPM | HEIGHT: 66 IN | WEIGHT: 265 LBS

## 2025-02-24 DIAGNOSIS — E11.9 TYPE 2 DIABETES MELLITUS WITHOUT COMPLICATION, WITHOUT LONG-TERM CURRENT USE OF INSULIN (H): ICD-10-CM

## 2025-02-24 DIAGNOSIS — E66.01 CLASS 3 SEVERE OBESITY DUE TO EXCESS CALORIES WITHOUT SERIOUS COMORBIDITY WITH BODY MASS INDEX (BMI) OF 40.0 TO 44.9 IN ADULT (H): ICD-10-CM

## 2025-02-24 DIAGNOSIS — Z12.4 CERVICAL CANCER SCREENING: ICD-10-CM

## 2025-02-24 DIAGNOSIS — E66.813 CLASS 3 SEVERE OBESITY DUE TO EXCESS CALORIES WITHOUT SERIOUS COMORBIDITY WITH BODY MASS INDEX (BMI) OF 40.0 TO 44.9 IN ADULT (H): ICD-10-CM

## 2025-02-24 DIAGNOSIS — Z00.00 ROUTINE GENERAL MEDICAL EXAMINATION AT A HEALTH CARE FACILITY: Primary | ICD-10-CM

## 2025-02-24 LAB
ERYTHROCYTE [DISTWIDTH] IN BLOOD BY AUTOMATED COUNT: 11.8 % (ref 10–15)
EST. AVERAGE GLUCOSE BLD GHB EST-MCNC: 240 MG/DL
HBA1C MFR BLD: 10 % (ref 0–5.6)
HCT VFR BLD AUTO: 38.1 % (ref 35–47)
HGB BLD-MCNC: 13.6 G/DL (ref 11.7–15.7)
MCH RBC QN AUTO: 29.6 PG (ref 26.5–33)
MCHC RBC AUTO-ENTMCNC: 35.7 G/DL (ref 31.5–36.5)
MCV RBC AUTO: 83 FL (ref 78–100)
PLATELET # BLD AUTO: 214 10E3/UL (ref 150–450)
RBC # BLD AUTO: 4.6 10E6/UL (ref 3.8–5.2)
WBC # BLD AUTO: 6.9 10E3/UL (ref 4–11)

## 2025-02-24 PROCEDURE — 82043 UR ALBUMIN QUANTITATIVE: CPT | Performed by: FAMILY MEDICINE

## 2025-02-24 PROCEDURE — 90471 IMMUNIZATION ADMIN: CPT | Performed by: FAMILY MEDICINE

## 2025-02-24 PROCEDURE — 91320 SARSCV2 VAC 30MCG TRS-SUC IM: CPT | Performed by: FAMILY MEDICINE

## 2025-02-24 PROCEDURE — 87624 HPV HI-RISK TYP POOLED RSLT: CPT | Performed by: FAMILY MEDICINE

## 2025-02-24 PROCEDURE — 85027 COMPLETE CBC AUTOMATED: CPT | Performed by: FAMILY MEDICINE

## 2025-02-24 PROCEDURE — 99214 OFFICE O/P EST MOD 30 MIN: CPT | Mod: 25 | Performed by: FAMILY MEDICINE

## 2025-02-24 PROCEDURE — G2211 COMPLEX E/M VISIT ADD ON: HCPCS | Performed by: FAMILY MEDICINE

## 2025-02-24 PROCEDURE — 90656 IIV3 VACC NO PRSV 0.5 ML IM: CPT | Performed by: FAMILY MEDICINE

## 2025-02-24 PROCEDURE — 36415 COLL VENOUS BLD VENIPUNCTURE: CPT | Performed by: FAMILY MEDICINE

## 2025-02-24 PROCEDURE — 84443 ASSAY THYROID STIM HORMONE: CPT | Performed by: FAMILY MEDICINE

## 2025-02-24 PROCEDURE — 82570 ASSAY OF URINE CREATININE: CPT | Performed by: FAMILY MEDICINE

## 2025-02-24 PROCEDURE — 90480 ADMN SARSCOV2 VAC 1/ONLY CMP: CPT | Performed by: FAMILY MEDICINE

## 2025-02-24 PROCEDURE — 99395 PREV VISIT EST AGE 18-39: CPT | Mod: 25 | Performed by: FAMILY MEDICINE

## 2025-02-24 PROCEDURE — 80053 COMPREHEN METABOLIC PANEL: CPT | Performed by: FAMILY MEDICINE

## 2025-02-24 PROCEDURE — 83036 HEMOGLOBIN GLYCOSYLATED A1C: CPT | Performed by: FAMILY MEDICINE

## 2025-02-24 PROCEDURE — 80061 LIPID PANEL: CPT | Performed by: FAMILY MEDICINE

## 2025-02-24 RX ORDER — METFORMIN HYDROCHLORIDE 500 MG/1
2000 TABLET, EXTENDED RELEASE ORAL
Qty: 360 TABLET | Refills: 3 | Status: SHIPPED | OUTPATIENT
Start: 2025-02-24

## 2025-02-24 SDOH — HEALTH STABILITY: PHYSICAL HEALTH: ON AVERAGE, HOW MANY DAYS PER WEEK DO YOU ENGAGE IN MODERATE TO STRENUOUS EXERCISE (LIKE A BRISK WALK)?: 4 DAYS

## 2025-02-24 ASSESSMENT — SOCIAL DETERMINANTS OF HEALTH (SDOH): HOW OFTEN DO YOU GET TOGETHER WITH FRIENDS OR RELATIVES?: ONCE A WEEK

## 2025-02-24 NOTE — PROGRESS NOTES
Preventive Care Visit  [facility]  Barbara Marie MD, Family Medicine  Feb 24, 2025      Assessment & Plan     (Z00.00) Routine general medical examination at a health care facility  (primary encounter diagnosis)  Comment: encourage annual pe and vaccine up date  Plan: Lipid panel reflex to direct LDL Fasting,         Comprehensive metabolic panel (BMP + Alb, Alk         Phos, ALT, AST, Total. Bili, TP), TSH with free        T4 reflex, CBC with platelets            (E11.9) Type 2 diabetes mellitus without complication, without long-term current use of insulin (H)  Comment: A1c 10 diabetes poor controlled and much worse compared to one year ago. Pt takes medication as instructed and no side effect. I ordered wegovy at 6/2024 and her insurance did not cover.   Plan: HEMOGLOBIN A1C, Albumin Random Urine         Quantitative with Creat Ratio, semaglutide         (OZEMPIC) 2 MG/3ML pen, Semaglutide, 1 MG/DOSE,        (OZEMPIC) 4 MG/3ML pen, blood glucose (NO BRAND        SPECIFIED) lancets standard, blood glucose (NO         BRAND SPECIFIED) test strip, metFORMIN         (GLUCOPHAGE XR) 500 MG 24 hr tablet        We discussed diet control, regular exercise, foot care and eye care. Will increase the dose of metformin from 1000 to 2000 daily. Will start ozempic, side effect addressed. Follow-up in 3 month/     (Z12.4) Cervical cancer screening  Comment:   Plan: HPV and Gynecologic Cytology Panel -         Recommended Age 30 - 65 Years            (E66.813,  E66.01,  Z68.41) Class 3 severe obesity due to excess calories without serious comorbidity with body mass index (BMI) of 40.0 to 44.9 in adult (H)  Comment: bmi 42 and failed to improve.   Plan: strongly advise regular exercise and low fat diet.     The longitudinal plan of care for the diagnosis(es)/condition(s) as documented were addressed during this visit. Due to the added complexity in care, I will continue to support Mao in the subsequent  "management and with ongoing continuity of care.      Patient has been advised of split billing requirements and indicates understanding: Yes        BMI  Estimated body mass index is 42.45 kg/m  as calculated from the following:    Height as of this encounter: 1.683 m (5' 6.25\").    Weight as of this encounter: 120.2 kg (265 lb).   Weight management plan: Discussed healthy diet and exercise guidelines    Counseling  Appropriate preventive services were addressed with this patient via screening, questionnaire, or discussion as appropriate for fall prevention, nutrition, physical activity, Tobacco-use cessation, social engagement, weight loss and cognition.  Checklist reviewing preventive services available has been given to the patient.  Reviewed patient's diet, addressing concerns and/or questions.       See Patient Instructions    Frank Linares is a 34 year old, presenting for the following:  Physical (Fasting- thinking about taking out nexplanon)        2/24/2025     8:08 AM   Additional Questions   Roomed by Gen SCOTT CMA          HPI      Diabetes Follow-up    How often are you checking your blood sugar? A few times a week  What time of day are you checking your blood sugars (select all that apply)?  Before and after meals  Have you had any blood sugars above 200?  Yes after eat  Have you had any blood sugars below 70?  No  What symptoms do you notice when your blood sugar is low?  None  What concerns do you have today about your diabetes? None   Do you have any of these symptoms? (Select all that apply)  No numbness or tingling in feet.  No redness, sores or blisters on feet.  No complaints of excessive thirst.  No reports of blurry vision.  No significant changes to weight.      BP Readings from Last 2 Encounters:   02/24/25 120/84   04/05/24 122/84     Hemoglobin A1C (%)   Date Value   02/24/2025 10.0 (H)   02/23/2024 6.5 (H)     LDL Cholesterol Calculated (mg/dL)   Date Value   02/23/2024 66   08/03/2023 45 "           Health Care Directive  Patient does not have a Health Care Directive: Discussed advance care planning with patient; however, patient declined at this time.      2/24/2025   General Health   How would you rate your overall physical health? (!) POOR   Feel stress (tense, anxious, or unable to sleep) Not at all         2/24/2025   Nutrition   Three or more servings of calcium each day? Yes   Diet: Regular (no restrictions)   How many servings of fruit and vegetables per day? (!) 2-3   How many sweetened beverages each day? 0-1         2/24/2025   Exercise   Days per week of moderate/strenous exercise 4 days         2/24/2025   Social Factors   Frequency of gathering with friends or relatives Once a week   Worry food won't last until get money to buy more No   Food not last or not have enough money for food? No   Do you have housing? (Housing is defined as stable permanent housing and does not include staying ouside in a car, in a tent, in an abandoned building, in an overnight shelter, or couch-surfing.) Yes   Are you worried about losing your housing? No   Lack of transportation? No   Unable to get utilities (heat,electricity)? No         2/24/2025   Dental   Dentist two times every year? Yes            Today's PHQ-2 Score:       2/24/2025     7:58 AM   PHQ-2 ( 1999 [employer])   Q1: Little interest or pleasure in doing things 1   Q2: Feeling down, depressed or hopeless 1   PHQ-2 Score 2    Q1: Little interest or pleasure in doing things Several days   Q2: Feeling down, depressed or hopeless Several days   PHQ-2 Score 2       Patient-reported           2/24/2025   Substance Use   Alcohol more than 3/day or more than 7/wk No   Do you use any other substances recreationally? No     Social History     Tobacco Use    Smoking status: Never     Passive exposure: Never    Smokeless tobacco: Never   Vaping Use    Vaping status: Never Used   Substance Use Topics    Alcohol use: No     Comment: Alcoholic Drinks/day: none  "   Drug use: No          Mammogram Screening - Patient under 40 years of age: Routine Mammogram Screening not recommended.         2/24/2025   STI Screening   New sexual partner(s) since last STI/HIV test? No     History of abnormal Pap smear: No - age 30- 64 PAP with HPV every 5 years recommended        Latest Ref Rng & Units 2/23/2024     1:38 PM 6/4/2021     9:42 AM 10/8/2019    11:09 AM   PAP / HPV   PAP  Atypical squamous cells of undetermined significance, cannot exclude high-grade squamous intraepithelial lesion (ASC-H)  Negative for squamous intraepithelial lesion or malignancy  Electronically signed by Tanika Rodriguez CT (ASCP) on 6/8/2021 at 12:52 PM    Negative for squamous intraepithelial lesion or malignancy  Electronically signed by Treasure Gil CT (ASCP) on 10/9/2019 at  4:04 PM      HPV 16 DNA Negative Negative      HPV 18 DNA Negative Negative      Other HR HPV Negative Negative              2/24/2025   Contraception/Family Planning   Questions about contraception or family planning No        Reviewed and updated as needed this visit by Provider   Tobacco  Allergies  Meds  Problems  Med Hx  Surg Hx  Fam Hx            Lab work is in process  Labs reviewed in EPIC      Review of Systems  Constitutional, HEENT, cardiovascular, pulmonary, GI, , musculoskeletal, neuro, skin, endocrine and psych systems are negative, except as otherwise noted.     Objective    Exam  /84 (BP Location: Left arm, Patient Position: Sitting, Cuff Size: Adult Regular)   Pulse 94   Temp 98.9  F (37.2  C) (Oral)   Resp 16   Ht 1.683 m (5' 6.25\")   Wt 120.2 kg (265 lb)   LMP 02/19/2025   SpO2 96%   Breastfeeding No   BMI 42.45 kg/m     Estimated body mass index is 42.45 kg/m  as calculated from the following:    Height as of this encounter: 1.683 m (5' 6.25\").    Weight as of this encounter: 120.2 kg (265 lb).    Physical Exam  GENERAL: alert and no distress  EYES: Eyes grossly normal to " inspection, PERRL and conjunctivae and sclerae normal  HENT: ear canals and TM's normal, nose and mouth without ulcers or lesions  NECK: no adenopathy, no asymmetry, masses, or scars  RESP: lungs clear to auscultation - no rales, rhonchi or wheezes  BREAST: normal without masses, tenderness or nipple discharge and no palpable axillary masses or adenopathy  CV: regular rate and rhythm, normal S1 S2, no S3 or S4, no murmur, click or rub, no peripheral edema  ABDOMEN: soft, nontender, no hepatosplenomegaly, no masses and bowel sounds normal   (female) w/bimanual: normal female external genitalia, normal urethral meatus, normal vaginal mucosa, and normal cervix/adnexa/uterus without masses or discharge  MS: no gross musculoskeletal defects noted, no edema  SKIN: no suspicious lesions or rashes  NEURO: Normal strength and tone, mentation intact and speech normal  PSYCH: mentation appears normal, affect normal/bright        Signed Electronically by: Barbara Marie MD

## 2025-02-24 NOTE — PATIENT INSTRUCTIONS
Patient Education   Preventive Care Advice   This is general advice given by our system to help you stay healthy. However, your care team may have specific advice just for you. Please talk to your care team about your preventive care needs.  Nutrition  Eat 5 or more servings of fruits and vegetables each day.  Try wheat bread, brown rice and whole grain pasta (instead of white bread, rice, and pasta).  Get enough calcium and vitamin D. Check the label on foods and aim for 100% of the RDA (recommended daily allowance).  Lifestyle  Exercise at least 150 minutes each week  (30 minutes a day, 5 days a week).  Do muscle strengthening activities 2 days a week. These help control your weight and prevent disease.  No smoking.  Wear sunscreen to prevent skin cancer.  Have a dental exam and cleaning every 6 months.  Yearly exams  See your health care team every year to talk about:  Any changes in your health.  Any medicines your care team has prescribed.  Preventive care, family planning, and ways to prevent chronic diseases.  Shots (vaccines)   HPV shots (up to age 26), if you've never had them before.  Hepatitis B shots (up to age 59), if you've never had them before.  COVID-19 shot: Get this shot when it's due.  Flu shot: Get a flu shot every year.  Tetanus shot: Get a tetanus shot every 10 years.  Pneumococcal, hepatitis A, and RSV shots: Ask your care team if you need these based on your risk.  Shingles shot (for age 50 and up)  General health tests  Diabetes screening:  Starting at age 35, Get screened for diabetes at least every 3 years.  If you are younger than age 35, ask your care team if you should be screened for diabetes.  Cholesterol test: At age 39, start having a cholesterol test every 5 years, or more often if advised.  Bone density scan (DEXA): At age 50, ask your care team if you should have this scan for osteoporosis (brittle bones).  Hepatitis C: Get tested at least once in your life.  STIs (sexually  transmitted infections)  Before age 24: Ask your care team if you should be screened for STIs.  After age 24: Get screened for STIs if you're at risk. You are at risk for STIs (including HIV) if:  You are sexually active with more than one person.  You don't use condoms every time.  You or a partner was diagnosed with a sexually transmitted infection.  If you are at risk for HIV, ask about PrEP medicine to prevent HIV.  Get tested for HIV at least once in your life, whether you are at risk for HIV or not.  Cancer screening tests  Cervical cancer screening: If you have a cervix, begin getting regular cervical cancer screening tests starting at age 21.  Breast cancer scan (mammogram): If you've ever had breasts, begin having regular mammograms starting at age 40. This is a scan to check for breast cancer.  Colon cancer screening: It is important to start screening for colon cancer at age 45.  Have a colonoscopy test every 10 years (or more often if you're at risk) Or, ask your provider about stool tests like a FIT test every year or Cologuard test every 3 years.  To learn more about your testing options, visit:   .  For help making a decision, visit:   https://bit.ly/ks61196.  Prostate cancer screening test: If you have a prostate, ask your care team if a prostate cancer screening test (PSA) at age 55 is right for you.  Lung cancer screening: If you are a current or former smoker ages 50 to 80, ask your care team if ongoing lung cancer screenings are right for you.  For informational purposes only. Not to replace the advice of your health care provider. Copyright   2023 [employer]. All rights reserved. Clinically reviewed by the [facility]. [employer] 868398 - REV 01/24.

## 2025-02-25 LAB
ALBUMIN SERPL BCG-MCNC: 4.1 G/DL (ref 3.5–5.2)
ALP SERPL-CCNC: 50 U/L (ref 40–150)
ALT SERPL W P-5'-P-CCNC: 56 U/L (ref 0–50)
ANION GAP SERPL CALCULATED.3IONS-SCNC: 12 MMOL/L (ref 7–15)
AST SERPL W P-5'-P-CCNC: 51 U/L (ref 0–45)
BILIRUB SERPL-MCNC: 1.6 MG/DL
BUN SERPL-MCNC: 10.1 MG/DL (ref 6–20)
CALCIUM SERPL-MCNC: 9.3 MG/DL (ref 8.8–10.4)
CHLORIDE SERPL-SCNC: 102 MMOL/L (ref 98–107)
CHOLEST SERPL-MCNC: 132 MG/DL
CREAT SERPL-MCNC: 0.66 MG/DL (ref 0.51–0.95)
CREAT UR-MCNC: 241.9 MG/DL
EGFRCR SERPLBLD CKD-EPI 2021: >90 ML/MIN/1.73M2
FASTING STATUS PATIENT QL REPORTED: ABNORMAL
FASTING STATUS PATIENT QL REPORTED: ABNORMAL
GLUCOSE SERPL-MCNC: 280 MG/DL (ref 70–99)
HCO3 SERPL-SCNC: 23 MMOL/L (ref 22–29)
HDLC SERPL-MCNC: 38 MG/DL
LDLC SERPL CALC-MCNC: 63 MG/DL
MICROALBUMIN UR-MCNC: 31 MG/L
MICROALBUMIN/CREAT UR: 12.82 MG/G CR (ref 0–25)
NONHDLC SERPL-MCNC: 94 MG/DL
POTASSIUM SERPL-SCNC: 3.8 MMOL/L (ref 3.4–5.3)
PROT SERPL-MCNC: 7.1 G/DL (ref 6.4–8.3)
SODIUM SERPL-SCNC: 137 MMOL/L (ref 135–145)
TRIGL SERPL-MCNC: 154 MG/DL
TSH SERPL DL<=0.005 MIU/L-ACNC: 1.29 UIU/ML (ref 0.3–4.2)

## 2025-02-26 ENCOUNTER — TELEPHONE (OUTPATIENT)
Dept: FAMILY MEDICINE | Facility: CLINIC | Age: 35
End: 2025-02-26
Payer: COMMERCIAL

## 2025-02-26 LAB
HPV HR 12 DNA CVX QL NAA+PROBE: NEGATIVE
HPV16 DNA CVX QL NAA+PROBE: NEGATIVE
HPV18 DNA CVX QL NAA+PROBE: NEGATIVE
HUMAN PAPILLOMA VIRUS FINAL DIAGNOSIS: NORMAL

## 2025-02-26 NOTE — TELEPHONE ENCOUNTER
Spoke to patient and relayed message from provider.  Patient verbalized understanding and agrees with plan. Scheduled with PCP for 3 months.    TESHA Gordon RN  [facility]

## 2025-02-26 NOTE — TELEPHONE ENCOUNTER
----- Message from Barbara Marie sent at 2/26/2025  9:52 AM CST -----  Please call pt for all lab results     1) A1c 10.0 diabetes poor controlled and much worse compared to one year ago. Increase the metformin dose to 2000 mg daily and start ozempic. Office Follow-up in 3 month.   2) abnormal liver function likely due to fatty liver. Advise low fat diet. Will monitor  3) normal thyroid and kidney function. Normal electrolytes. Normal blood cell count.   4) elevated triglycerides and low hdl. Advise low fat diet.     Barbara Marie MD on 2/26/2025 at 9:52 AM     77

## 2025-02-28 PROBLEM — R87.611 PAPANICOLAOU SMEAR OF CERVIX WITH ATYPICAL SQUAMOUS CELLS CANNOT EXCLUDE HIGH GRADE SQUAMOUS INTRAEPITHELIAL LESION (ASC-H): Status: ACTIVE | Noted: 2024-03-06

## 2025-03-18 ENCOUNTER — OFFICE VISIT (OUTPATIENT)
Dept: FAMILY MEDICINE | Facility: CLINIC | Age: 35
End: 2025-03-18
Payer: COMMERCIAL

## 2025-03-18 VITALS
RESPIRATION RATE: 16 BRPM | HEIGHT: 66 IN | OXYGEN SATURATION: 96 % | WEIGHT: 269 LBS | DIASTOLIC BLOOD PRESSURE: 94 MMHG | SYSTOLIC BLOOD PRESSURE: 143 MMHG | BODY MASS INDEX: 43.23 KG/M2 | HEART RATE: 82 BPM | TEMPERATURE: 97.2 F

## 2025-03-18 DIAGNOSIS — K52.9 CHRONIC DIARRHEA: ICD-10-CM

## 2025-03-18 DIAGNOSIS — Z30.46 ENCOUNTER FOR NEXPLANON REMOVAL: Primary | ICD-10-CM

## 2025-03-18 PROCEDURE — 11982 REMOVE DRUG IMPLANT DEVICE: CPT | Performed by: PHYSICIAN ASSISTANT

## 2025-03-18 PROCEDURE — 3075F SYST BP GE 130 - 139MM HG: CPT | Performed by: PHYSICIAN ASSISTANT

## 2025-03-18 PROCEDURE — 99213 OFFICE O/P EST LOW 20 MIN: CPT | Mod: 25 | Performed by: PHYSICIAN ASSISTANT

## 2025-03-18 PROCEDURE — 3080F DIAST BP >= 90 MM HG: CPT | Performed by: PHYSICIAN ASSISTANT

## 2025-03-18 RX ORDER — LIDOCAINE HYDROCHLORIDE AND EPINEPHRINE 10; 10 MG/ML; UG/ML
2.5 INJECTION, SOLUTION INFILTRATION; PERINEURAL ONCE
Status: ACTIVE | OUTPATIENT
Start: 2025-03-18

## 2025-03-18 SDOH — HEALTH STABILITY: PHYSICAL HEALTH: ON AVERAGE, HOW MANY DAYS PER WEEK DO YOU ENGAGE IN MODERATE TO STRENUOUS EXERCISE (LIKE A BRISK WALK)?: 3 DAYS

## 2025-03-18 ASSESSMENT — SOCIAL DETERMINANTS OF HEALTH (SDOH): HOW OFTEN DO YOU GET TOGETHER WITH FRIENDS OR RELATIVES?: ONCE A WEEK

## 2025-03-18 NOTE — PROGRESS NOTES
Subjective:    Vijay Betancur is a 34 year old female who presents for Nexplanon removal.    1.  Nexplanon removal  Patient's Nexplanon has been in for approximately 3 years.  She would like Nexplanon removed today.  She does not want another form of birth control at this time.  We reviewed that becoming pregnancy right now would be high risk, given her uncontrolled type 2 diabetes.  She is seeing her PCP regularly for diabetes care.  She states she and her partner will be using condoms.  She is aware of Plan B over-the-counter.    2.  Chronic diarrhea  This was a concern she raised for the end of her visit.  She has chronic frequent diarrhea.  Feels like she has had problems with IBS since she was in high school.  Has never really had this addressed before per her report.  Feels like she often has diarrhea right after she eats.  She did review this briefly with her PCP at her physical recently.  She is wondering if possibly she should get a colonoscopy to rule out anything more serious.  She notes her aunt has Crohn's disease, no other family history of colon cancer or significant GI issues.    Patient Active Problem List   Diagnosis    Class 3 severe obesity without serious comorbidity with body mass index (BMI) of 40.0 to 44.9 in adult (H)    Type 2 diabetes mellitus without complication, without long-term current use of insulin (H)    Papanicolaou smear of cervix with atypical squamous cells cannot exclude high grade squamous intraepithelial lesion (ASC-H)       Current Outpatient Medications:     blood glucose (NO BRAND SPECIFIED) lancets standard, Use to test blood sugar 1 times daily or as directed., Disp: 100 Lancet, Rfl: 3    blood glucose (NO BRAND SPECIFIED) test strip, Use to test blood sugar 1 times daily or as directed., Disp: 100 strip, Rfl: 3    etonogestrel (NEXPLANON) 68 MG IMPL, 1 each (68 mg) by Subdermal route continuous, Disp: , Rfl:     metFORMIN (GLUCOPHAGE XR) 500 MG 24 hr tablet, Take 4 tablets  "(2,000 mg) by mouth daily (with dinner)., Disp: 360 tablet, Rfl: 3    semaglutide (OZEMPIC) 2 MG/3ML pen, Inject 0.5 mg subcutaneously every 7 days for 28 days., Disp: 3 mL, Rfl: 0    Semaglutide, 1 MG/DOSE, (OZEMPIC) 4 MG/3ML pen, Inject 1 mg subcutaneously every 7 days., Disp: 3 mL, Rfl: 5    Alcohol Swabs (ALCOHOL PREP) 70 % PADS, USE 1 EACH 4 TIMES DAILY/ TXHUA HNUB SIV 4 ZAUG, Disp: , Rfl:       Objective:   Allergies:  Patient has no known allergies.    BP (!) 143/94 (BP Location: Left arm, Patient Position: Sitting, Cuff Size: Adult Large)   Pulse 82   Temp 97.2  F (36.2  C) (Temporal)   Resp 16   Ht 1.675 m (5' 5.95\")   Wt 122 kg (269 lb)   LMP 03/17/2025   SpO2 96%   BMI 43.49 kg/m    Body mass index is 43.49 kg/m .    General: Alert and oriented x 3, in no apparent distress      Procedure:  Left upper inner arm was adequately anesthetized with 2.5 cc of lidocaine with Epi.  Then, using sterile technique, 5 mm incision was made with an 11 blade.  Nexplanon was palpated subcutaneously and removed with a hemostat clamp.  Incision site was closed with steri-strips and wrapped with a pressure bandage.  Patient was neurovascularly intact after exam.  Appropriate wound aftercare was dicussed with patient.         Assessment and Plan:   1. Encounter for Nexplanon removal (Primary)  Nexplanon removed today.  Patient is aware it is possible to get become pregnant as soon as Nexplanon is removed.  She declines other offer of birth control today.  Follow-up with PCP as needed.  - REMOVAL NEXPLANON  - lidocaine 1% with EPINEPHrine 1:100,000 injection 2.5 mL    2. Chronic diarrhea  Chronic problem.  Patient thinks she has had IBS-type symptoms for many years.  Patient has frequent diarrhea, especially after eating.  She is wondering if she should get a colonoscopy to rule out colon cancer or something more serious.  She has never been tested for H. pylori before.  No known family history of H pylori.  Her "  never had GI issues, then once they were  he started having similar GI issues to her.  I discussed it may be a good idea to at least first test for H. pylori, and then if that is negative refer her on to GI.  She would like H. pylori testing.  Test kit provided for her today and I will follow-up with results.  - Helicobacter pylori Antigen Stool; Future    This dictation uses voice recognition software, which may contain typographical errors.

## 2025-03-18 NOTE — PROGRESS NOTES
Prior to immunization administration, verified patients identity using patient s name and date of birth. Please see Immunization Activity for additional information.     Screening Questionnaire for Adult Immunization    Are you sick today?   No   Do you have allergies to medications, food, a vaccine component or latex?   No   Have you ever had a serious reaction after receiving a vaccination?   No   Do you have a long-term health problem with heart, lung, kidney, or metabolic disease (e.g., diabetes), asthma, a blood disorder, no spleen, complement component deficiency, a cochlear implant, or a spinal fluid leak?  Are you on long-term aspirin therapy?   No   Do you have cancer, leukemia, HIV/AIDS, or any other immune system problem?   No   Do you have a parent, brother, or sister with an immune system problem?   No   In the past 3 months, have you taken medications that affect  your immune system, such as prednisone, other steroids, or anticancer drugs; drugs for the treatment of rheumatoid arthritis, Crohn s disease, or psoriasis; or have you had radiation treatments?   No   Have you had a seizure, or a brain or other nervous system problem?   No   During the past year, have you received a transfusion of blood or blood    products, or been given immune (gamma) globulin or antiviral drug?   No   For women: Are you pregnant or is there a chance you could become       pregnant during the next month?   No   Have you received any vaccinations in the past 4 weeks?   No     Immunization questionnaire answers were all negative.      Patient instructed to remain in clinic for 15 minutes afterwards, and to report any adverse reactions.     Screening performed by Katerina Porter MA on 3/18/2025 at 8:12 AM.

## 2025-04-02 ENCOUNTER — LAB (OUTPATIENT)
Dept: LAB | Facility: CLINIC | Age: 35
End: 2025-04-02
Payer: COMMERCIAL

## 2025-04-02 DIAGNOSIS — K52.9 CHRONIC DIARRHEA: ICD-10-CM

## 2025-04-06 DIAGNOSIS — A04.8 H. PYLORI INFECTION: Primary | ICD-10-CM

## 2025-04-06 RX ORDER — TETRACYCLINE HYDROCHLORIDE 500 MG/1
500 CAPSULE ORAL 4 TIMES DAILY
Qty: 56 CAPSULE | Refills: 0 | Status: SHIPPED | OUTPATIENT
Start: 2025-04-06 | End: 2025-04-20

## 2025-04-06 RX ORDER — OMEPRAZOLE 20 MG/1
20 CAPSULE, DELAYED RELEASE ORAL 2 TIMES DAILY
Qty: 28 CAPSULE | Refills: 0 | Status: SHIPPED | OUTPATIENT
Start: 2025-04-06 | End: 2025-04-20

## 2025-04-06 RX ORDER — BISMUTH SUBSALICYLATE 262 MG/1
2 TABLET, CHEWABLE ORAL 4 TIMES DAILY
Qty: 112 TABLET | Refills: 0 | Status: SHIPPED | OUTPATIENT
Start: 2025-04-06 | End: 2025-04-20

## 2025-04-06 RX ORDER — METRONIDAZOLE 500 MG/1
500 TABLET ORAL 4 TIMES DAILY
Qty: 56 TABLET | Refills: 0 | Status: SHIPPED | OUTPATIENT
Start: 2025-04-06 | End: 2025-04-20

## 2025-04-08 ENCOUNTER — TELEPHONE (OUTPATIENT)
Dept: FAMILY MEDICINE | Facility: CLINIC | Age: 35
End: 2025-04-08
Payer: COMMERCIAL

## 2025-04-08 NOTE — TELEPHONE ENCOUNTER
MTM referral from: [facility] visit (referral by provider)    MTM referral outreach attempt #2 on April 8, 2025 at 2:32 PM      Outcome: Patient not reachable after several attempts, sent [employer] message    Use private pay  (ok to bill consult)  for the carrier/Plan on the flowsheet      [employer] Message Sent    Sending to referring provider as ROBY Andre - [facility]    [phone]

## 2025-04-09 NOTE — TELEPHONE ENCOUNTER
Contacted patient with message below  Patient will call clinic back if unable to get a sooner appointment  No further action needed    Jennifer Love RN  [facility]    Juma Goss    Have patient call the [facility] Scheduling Line: [phone] to request a sooner appointment. They may be able to schedule her to come see me in [place].

## 2025-04-09 NOTE — TELEPHONE ENCOUNTER
Contacted patient and scheduled an appointment on 5/22/25 with  Juma PAULA.  Patient requesting a sooner appt for H Pylori treatment.  Message routed to Juma PAULA for possible sooner appointment     Jennifer Love RN  [facility]    Juma Goss  Please continue outreach for initial mtm. 60 minutes in clinic with medications. Remind patient not to start H. Pylori treatment until after the MTM visit.

## 2025-05-12 NOTE — PROGRESS NOTES
Medication Therapy Management (MTM) Encounter    ASSESSMENT:                            Medication Adherence/Access: No issues identified.       H Pylori: Reviewed directions of quadruple therapy treatment course. Emphasized the importance of daily adherence and completing full 14 day course to ensure eradication. Reviewed common side effects of treatment including diarrhea/stomach upset. Suggested taking medication with food. If side effects are not tolerable, recommended patient call the clinic to discuss before discontinuing therapy.       Diabetes   A1C today down significantly, but not yet goal <7%. Not testing home blood glucose frequently. Discussed CGM. Dexcom covered per test claims. Discussed continuing GLP1 titration or switch to equivalent dose of Mounjaro. Patient would like to try Mounjaro. Her sister uses it and has found it more effective.       Contraception:   Previously had nexplanon. Interested in alternative forms of contraception. Discussed weight concerns with Depot. Patient hesitant about use of vaginal rings or longer-acting agents like IUD. Elected to start with JADEN. Discussed importance of adherence for effectiveness.       PLAN:                            Use the printed med schedule for the H. Pylori regimen. You will need to purchase Bismuth over the counter (generic tabs of Pepto Bismol chew tabs).   Start norethindrone-ethinyl estradiol 1-20 mg-mcg daily  Start Dexcom G7 sensors.   Stop Ozempic. Start Mounjaro 5 mg weekly.     Follow-up: Return in about 6 weeks (around 6/24/2025) for Medication Management Pharmacist, by phone.  Follow-up with PCP in 6 weeks     SUBJECTIVE/OBJECTIVE:                          Vijay Betancur is a 34 year old female seen for an initial visit. She was referred to me from Maria Antonia Branham PA-C.      Reason for visit: H. Pylori treatment.    Allergies/ADRs: Reviewed in chart  Past Medical History: Reviewed in chart  Tobacco: She reports that she has never  smoked. She has never been exposed to tobacco smoke. She has never used smokeless tobacco.  Alcohol:  reports no history of alcohol use.        Medication Adherence/Access: no issues reported.        H. Pylori:  Metronidazole 500 mg four times daily   Tetracycline 500 mg four times daily   Bismuth Subsalicylate 262 mg 2 tabs four times daily   Omeprazole 20 mg twice daily     Did not get bismuth.     Right after eating, having diarrhea. Symptoms have been long-lasting. Would get courses of imodium.      is having similar symptoms but no insurance so hasn't gotten treated/tested.             Diabetes   Metformin 500 mg ER 4 tabs daily   Ozempic 1 mg weekly     Doesn't check blood glucose.   Last check was a few weeks ago. 120 fasting.     No blood glucose symptoms.              Contraception:   Previously had nexplanon. Thinking about having Depot now.   Feels like she had some weight loss after nexplanon.     Just started her period today.       Today's Vitals: /88   Pulse 89   Wt 254 lb (115.2 kg)   LMP 03/17/2025   BMI 41.07 kg/m    ----------------      I spent 60 minutes with this patient today. All changes were made via collaborative practice agreement with Maria Antonia Branham PA-C.     A summary of these recommendations was sent via [employer].    Juma Goss, TurnerD  Medication Therapy Management (MTM) Pharmacist  [facility]           Medication Therapy Recommendations  General counseling for prescription of oral contraceptives   1 Rationale: Untreated condition - Needs additional medication therapy - Indication   Recommendation: Start Medication - Junel FE 1/20 1-20 MG-MCG Tabs   Status: Accepted per CPA   Identified Date: 5/13/2025 Completed Date: 5/13/2025         H. pylori infection   1 Current Medication: bismuth subsalicylate (PEPTO BISMOL) 262 MG chewable tablet   Current Medication Sig: Take 2 tablets by mouth 4 times daily (before meals and nightly).   Rationale:  Medication product not available - Adherence - Adherence   Recommendation: Provide Adherence Intervention   Status: Accepted per CPA   Identified Date: 5/13/2025 Completed Date: 5/13/2025         Type 2 diabetes mellitus without complication, without long-term current use of insulin (H)   1 Current Medication: Semaglutide, 1 MG/DOSE, (OZEMPIC) 4 MG/3ML pen (Discontinued)   Current Medication Sig: Inject 1 mg subcutaneously every 7 days.   Rationale: Dose too low - Dosage too low - Effectiveness   Recommendation: Increase Dose   Status: Accepted per CPA   Identified Date: 5/13/2025 Completed Date: 5/13/2025

## 2025-05-13 ENCOUNTER — OFFICE VISIT (OUTPATIENT)
Dept: PHARMACY | Facility: CLINIC | Age: 35
End: 2025-05-13
Payer: COMMERCIAL

## 2025-05-13 ENCOUNTER — LAB (OUTPATIENT)
Dept: LAB | Facility: CLINIC | Age: 35
End: 2025-05-13
Payer: COMMERCIAL

## 2025-05-13 ENCOUNTER — RESULTS FOLLOW-UP (OUTPATIENT)
Dept: PHARMACY | Facility: CLINIC | Age: 35
End: 2025-05-13

## 2025-05-13 VITALS
WEIGHT: 254 LBS | DIASTOLIC BLOOD PRESSURE: 88 MMHG | SYSTOLIC BLOOD PRESSURE: 135 MMHG | BODY MASS INDEX: 41.07 KG/M2 | HEART RATE: 89 BPM

## 2025-05-13 DIAGNOSIS — E11.9 TYPE 2 DIABETES MELLITUS WITHOUT COMPLICATION, WITHOUT LONG-TERM CURRENT USE OF INSULIN (H): ICD-10-CM

## 2025-05-13 DIAGNOSIS — Z30.09 GENERAL COUNSELING FOR PRESCRIPTION OF ORAL CONTRACEPTIVES: ICD-10-CM

## 2025-05-13 DIAGNOSIS — A04.8 H. PYLORI INFECTION: Primary | ICD-10-CM

## 2025-05-13 LAB
EST. AVERAGE GLUCOSE BLD GHB EST-MCNC: 160 MG/DL
HBA1C MFR BLD: 7.2 % (ref 0–5.6)

## 2025-05-13 PROCEDURE — 3075F SYST BP GE 130 - 139MM HG: CPT | Performed by: PHARMACIST

## 2025-05-13 PROCEDURE — 99207 PR NO CHARGE LOS: CPT | Performed by: PHARMACIST

## 2025-05-13 PROCEDURE — 83036 HEMOGLOBIN GLYCOSYLATED A1C: CPT

## 2025-05-13 PROCEDURE — 3079F DIAST BP 80-89 MM HG: CPT | Performed by: PHARMACIST

## 2025-05-13 PROCEDURE — 36415 COLL VENOUS BLD VENIPUNCTURE: CPT

## 2025-05-13 RX ORDER — NORETHINDRONE ACETATE AND ETHINYL ESTRADIOL 1MG-20(21)
1 KIT ORAL DAILY
Qty: 84 TABLET | Refills: 1 | Status: SHIPPED | OUTPATIENT
Start: 2025-05-13

## 2025-05-13 RX ORDER — ACYCLOVIR 400 MG/1
1 TABLET ORAL
Qty: 3 EACH | Refills: 5 | Status: SHIPPED | OUTPATIENT
Start: 2025-05-13

## 2025-05-13 RX ORDER — METRONIDAZOLE 500 MG/1
500 TABLET ORAL 4 TIMES DAILY
COMMUNITY

## 2025-05-13 RX ORDER — BISMUTH SUBSALICYLATE 262 MG/1
2 TABLET, CHEWABLE ORAL
COMMUNITY

## 2025-05-13 RX ORDER — TETRACYCLINE HYDROCHLORIDE 500 MG/1
500 CAPSULE ORAL 4 TIMES DAILY
COMMUNITY

## 2025-05-13 NOTE — PATIENT INSTRUCTIONS
"Recommendations from today's MTM visit:                                                      MTM (medication therapy management) is a service provided by a clinical pharmacist designed to help you get the most of out of your medicines.   Today we reviewed what your medicines are for, how to know if they are working, that your medicines are safe and how to make your medicine regimen as easy as possible.      Take a look at [facility]  for low cost clinics in the area for your .   Use the printed med schedule for the H. Pylori regimen. You will need to purchase Bismuth over the counter (generic tabs of Pepto Bismol chew tabs).   Start norethindrone-ethinyl estradiol 1-20 mg-mcg daily  Start Dexcom G7 sensors.   Stop Ozempic. Start Mounjaro 5 mg weekly.     Follow-up: Return in about 6 weeks (around 6/24/2025) for Medication Management Pharmacist, by phone.    It was great speaking with you today.  I value your experience and would be very thankful for your time in providing feedback in our clinic survey. In the next few days, you may receive an email or text message from [employer] with a link to a survey related to your  clinical pharmacist.\"     To schedule another MTM appointment, please call the clinic directly or you may call the MTM scheduling line at [phone].    My Clinical Pharmacist's contact information:                                                      Please feel free to contact me with any questions or concerns you have.      Juma Goss, PharmD  Medication Therapy Management (MTM) Pharmacist  [facility]           Medication List            Accurate as of May 13, 2025  1:37 PM. If you have any questions, ask your nurse or doctor.                START taking these medications          Morning Afternoon Evening Bedtime    Dexcom G7 Sensor Misc  1 Device every 10 days.  Started by: Juma Goss         1 Device every 10 days.      " norethindrone-ethinyl estradiol 1-20 MG-MCG tablet  Also known as: JUNEL FE 1/20  Take 1 tablet by mouth daily.  Started by: Juma Goss        1 tablet         tirzepatide 5 MG/0.5ML Soaj auto-injector pen  Also known as: MOUNJARO  Inject 0.5 mLs (5 mg) subcutaneously every 7 days.  Started by: Juma Goss         Inject 0.5 mLs (5 mg) subcutaneously every 7 days.            CONTINUE taking these medications          Morning Afternoon Evening Bedtime    bismuth subsalicylate 262 MG chewable tablet  Also known as: PEPTO BISMOL  Take 2 tablets by mouth 4 times daily (before meals and nightly).        2 tablets     2 tablets     2 tablets     2 tablets      blood glucose lancets standard  Also known as: NO BRAND SPECIFIED  Use to test blood sugar 1 times daily or as directed.         Use to test blood sugar 1 times daily or as directed.      blood glucose test strip  Also known as: NO BRAND SPECIFIED  Use to test blood sugar 1 times daily or as directed.         Use to test blood sugar 1 times daily or as directed.      metFORMIN 500 MG 24 hr tablet  Also known as: GLUCOPHAGE XR  Take 4 tablets (2,000 mg) by mouth daily (with dinner).          4 tablets       metroNIDAZOLE 500 MG tablet  Also known as: FLAGYL  Take 500 mg by mouth 4 times daily.                     omeprazole 20 MG tablet  Take 20 mg by mouth 2 times daily.        1 tablet      1 tablet       tetracycline 500 MG capsule  Also known as: ACHROMYCIN/SUMYCIN  Take 500 mg by mouth 4 times daily.                           STOP taking these medications       Semaglutide (1 MG/DOSE) 4 MG/3ML pen  Also known as: OZEMPIC  Stopped by: Juma Goss               Where to Get Your Medications        These medications will be mailed to you       From: [facility] - [place], [place] - [address] Phone: [phone]   Dexcom G7 Sensor Misc  norethindrone-ethinyl estradiol 1-20 MG-MCG tablet  tirzepatide 5 MG/0.5ML Soaj  auto-injector pen

## 2025-05-19 ENCOUNTER — TELEPHONE (OUTPATIENT)
Dept: FAMILY MEDICINE | Facility: CLINIC | Age: 35
End: 2025-05-19

## 2025-05-19 DIAGNOSIS — A04.8 H. PYLORI INFECTION: Primary | ICD-10-CM

## 2025-05-19 DIAGNOSIS — Z30.09 GENERAL COUNSELING FOR PRESCRIPTION OF ORAL CONTRACEPTIVES: ICD-10-CM

## 2025-05-19 NOTE — TELEPHONE ENCOUNTER
"Juma--- please review notes and advise.    Pt called reporting that she saw pharmacist Juma last week. Was prescribed meds for H. Pylori. Reported that Juma said if pt is not able to tolerate meds, pt should call clinic before discontinuing therapy. Pt reported:    No being able to take in any food or liquid  Took med at 12pm  For a little bit puke out the med and food  3. Tried eating again but not able to keep tat as well  4. Very nauseous and vomit    According to 5/13/25 notes:   \"H Pylori: Reviewed directions of quadruple therapy treatment course. Emphasized the importance of daily adherence and completing full 14 day course to ensure eradication. Reviewed common side effects of treatment including diarrhea/stomach upset. Suggested taking medication with food. If side effects are not tolerable, recommended patient call the clinic to discuss before discontinuing therapy.\"    Can leave detailed message if pt does not answer phone.    Todd Mesa, KISHORN, PHN, RN-[facility]     "

## 2025-05-20 RX ORDER — NORETHINDRONE ACETATE AND ETHINYL ESTRADIOL 1MG-20(21)
1 KIT ORAL DAILY
Qty: 84 TABLET | Refills: 1 | Status: SHIPPED | OUTPATIENT
Start: 2025-05-20

## 2025-05-20 RX ORDER — ONDANSETRON 4 MG/1
4 TABLET, ORALLY DISINTEGRATING ORAL EVERY 8 HOURS PRN
Qty: 90 TABLET | Refills: 0 | Status: SHIPPED | OUTPATIENT
Start: 2025-05-20

## 2025-05-20 NOTE — TELEPHONE ENCOUNTER
Called patient.     For the past few days, felt like she had to vomit, but didn't actually vomit until yesterday.   Took a dose at noon. Threw it up. Skipped the 5pm dose. Took bedtime dose. Was able to take the AM dose today.     Doing better today. Thinks she would like to continue with treatment. Has only vomited the 1 dose.      Will send in for some Zofran to help get through     Also notes she couldn't get OCP from [facility] pharmacy. Would like Rx resent to [facility].

## 2025-06-16 ENCOUNTER — OFFICE VISIT (OUTPATIENT)
Dept: FAMILY MEDICINE | Facility: CLINIC | Age: 35
End: 2025-06-16
Payer: COMMERCIAL

## 2025-06-16 VITALS
TEMPERATURE: 97.4 F | HEART RATE: 88 BPM | HEIGHT: 67 IN | SYSTOLIC BLOOD PRESSURE: 119 MMHG | DIASTOLIC BLOOD PRESSURE: 87 MMHG | OXYGEN SATURATION: 96 % | WEIGHT: 256 LBS | RESPIRATION RATE: 16 BRPM | BODY MASS INDEX: 40.18 KG/M2

## 2025-06-16 DIAGNOSIS — A04.8 H. PYLORI INFECTION: Primary | ICD-10-CM

## 2025-06-16 DIAGNOSIS — E11.9 TYPE 2 DIABETES MELLITUS WITHOUT COMPLICATION, WITHOUT LONG-TERM CURRENT USE OF INSULIN (H): ICD-10-CM

## 2025-06-16 PROCEDURE — 3074F SYST BP LT 130 MM HG: CPT | Performed by: PHYSICIAN ASSISTANT

## 2025-06-16 PROCEDURE — 99213 OFFICE O/P EST LOW 20 MIN: CPT | Performed by: PHYSICIAN ASSISTANT

## 2025-06-16 PROCEDURE — G2211 COMPLEX E/M VISIT ADD ON: HCPCS | Performed by: PHYSICIAN ASSISTANT

## 2025-06-16 PROCEDURE — 3079F DIAST BP 80-89 MM HG: CPT | Performed by: PHYSICIAN ASSISTANT

## 2025-06-16 NOTE — PROGRESS NOTES
Prior to immunization administration, verified patients identity using patient s name and date of birth. Please see Immunization Activity for additional information.     Screening Questionnaire for Adult Immunization    Are you sick today?   No   Do you have allergies to medications, food, a vaccine component or latex?   No   Have you ever had a serious reaction after receiving a vaccination?   No   Do you have a long-term health problem with heart, lung, kidney, or metabolic disease (e.g., diabetes), asthma, a blood disorder, no spleen, complement component deficiency, a cochlear implant, or a spinal fluid leak?  Are you on long-term aspirin therapy?   No   Do you have cancer, leukemia, HIV/AIDS, or any other immune system problem?   No   Do you have a parent, brother, or sister with an immune system problem?   No   In the past 3 months, have you taken medications that affect  your immune system, such as prednisone, other steroids, or anticancer drugs; drugs for the treatment of rheumatoid arthritis, Crohn s disease, or psoriasis; or have you had radiation treatments?   No   Have you had a seizure, or a brain or other nervous system problem?   No   During the past year, have you received a transfusion of blood or blood    products, or been given immune (gamma) globulin or antiviral drug?   No   For women: Are you pregnant or is there a chance you could become       pregnant during the next month?   No   Have you received any vaccinations in the past 4 weeks?   No     Immunization questionnaire answers were all negative.        Patient instructed to remain in clinic for 15 minutes afterwards, and to report any adverse reactions.     Screening performed by Katerina Porter MA on 6/16/2025 at 7:43 AM.       Answers submitted by the patient for this visit:  Diabetes Visit (Submitted on 6/16/2025)  Chief Complaint: Chronic problems general questions HPI Form  Frequency of checking blood sugars:: not at all  Diabetic  concerns:: other  Paraesthesia present:: none of these symptoms  General Questionnaire (Submitted on 6/16/2025)  Chief Complaint: Chronic problems general questions HPI Form  How many servings of fruits and vegetables do you eat daily?: 2-3  On average, how many sweetened beverages do you drink each day (Examples: soda, juice, sweet tea, etc.  Do NOT count diet or artificially sweetened beverages)?: 1  How many minutes a day do you exercise enough to make your heart beat faster?: 30 to 60  How many days a week do you exercise enough to make your heart beat faster?: 4  How many days per week do you miss taking your medication?: 0  Questionnaire about: Chronic problems general questions HPI Form (Submitted on 6/16/2025)  Chief Complaint: Chronic problems general questions HPI Form

## 2025-06-16 NOTE — PROGRESS NOTES
Subjective:    Vijay Betancur is a 34 year old female who presents with chief complaint of 2 concerns:    1.  Follow-up H. pylori  Diagnosed with H. pylori on 4/2/2025.  She saw our pharmacist to review medication treatment about 1 month ago.  She confirms she did complete for medication treatment.  Finished the medicine about 3 weeks ago.  Feels like her symptoms got a little better while taking the medicine, then symptoms got a little worse again when she finished the medicine.  She does continue to still have diarrhea, though it is less frequent than before and  usually not right after eating.    2. Diabetes check  Started Mounjaro about 1 month ago.  Doing well.  Also has CGM.  She says her average sugars have decreased from 170s to 150s.      Diabetes:   She presents for follow up of diabetes.    She is not checking blood glucose.        She is concerned about other.    She is not experiencing numbness or burning in feet, excessive thirst, blurry vision, weight changes or redness, sores or blisters on feet.         Patient Active Problem List   Diagnosis    Class 3 severe obesity without serious comorbidity with body mass index (BMI) of 40.0 to 44.9 in adult (H)    Type 2 diabetes mellitus without complication, without long-term current use of insulin (H)    Papanicolaou smear of cervix with atypical squamous cells cannot exclude high grade squamous intraepithelial lesion (ASC-H)    Chronic diarrhea    H. pylori infection       Current Outpatient Medications:     bismuth subsalicylate (PEPTO BISMOL) 262 MG chewable tablet, Take 2 tablets by mouth 4 times daily (before meals and nightly)., Disp: , Rfl:     blood glucose (NO BRAND SPECIFIED) lancets standard, Use to test blood sugar 1 times daily or as directed., Disp: 100 Lancet, Rfl: 3    blood glucose (NO BRAND SPECIFIED) test strip, Use to test blood sugar 1 times daily or as directed., Disp: 100 strip, Rfl: 3    Continuous Glucose Sensor (DEXCOM G7 SENSOR) MISC, 1  "Device every 10 days., Disp: 3 each, Rfl: 5    metFORMIN (GLUCOPHAGE XR) 500 MG 24 hr tablet, Take 4 tablets (2,000 mg) by mouth daily (with dinner)., Disp: 360 tablet, Rfl: 3    tirzepatide (MOUNJARO) 5 MG/0.5ML SOAJ auto-injector pen, Inject 0.5 mLs (5 mg) subcutaneously every 7 days., Disp: 2 mL, Rfl: 0    norethindrone-ethinyl estradiol (JUNEL FE 1/20) 1-20 MG-MCG tablet, Take 1 tablet by mouth daily., Disp: 84 tablet, Rfl: 1      Objective:   Allergies:  Patient has no known allergies.    Vitals:  Vitals:    06/16/25 0743   BP: 119/87   BP Location: Right arm   Patient Position: Sitting   Cuff Size: Adult Large   Pulse: 88   Resp: 16   Temp: 97.4  F (36.3  C)   TempSrc: Temporal   SpO2: 96%   Weight: 116.1 kg (256 lb)   Height: 1.7 m (5' 6.93\")       Body mass index is 40.18 kg/m .    Vital signs reviewed.  General: Patient is alert and oriented x 3, in no apparent distress  Cardiac: regular rate and rhythm, no murmurs  Pulmonary: lungs clear to auscultation bilaterally, no crackles, rales, rhonchi, or wheezing noted      Results for orders placed or performed in visit on 05/13/25   Hemoglobin A1c     Status: Abnormal   Result Value Ref Range    Estimated Average Glucose 160 (H) <117 mg/dL    Hemoglobin A1C 7.2 (H) 0.0 - 5.6 %         Assessment and Plan:   1. H. pylori infection (Primary)  H. pylori treated with quadruple therapy, finished antibiotic 3 weeks ago.  She does have stool kit at home to use for test for cure.  I recommended she wait at least a week before doing that test.  If results are still positive, we will contact her and discuss next steps.  If results are negative, I discussed that it can be normal for some symptoms to linger for a while after finishing medicine.  If results are negative could continue to monitor symptoms for a few more weeks.  If no improvement, then would refer her to GI.  She agrees with plan.    2. Type 2 diabetes mellitus without complication, without long-term current " use of insulin (H)  Chronic, improving.  Close to goal of A1c less than 7%.  Last A1c = 7.2% 1 month ago.  This is a significant improvement from previous A1c of 10.0% in February 2025.  She is taking Mounjaro 5 mg weekly.  We discussed increasing this dose.  She would like to do it.    Dose increased from 5 mg weekly to 7.5 mg weekly.  She is due for her next shot on Tuesday, tomorrow.    No side effects of Mounjaro presently.  If she notices side effects, or if her sugars start to get too low, she will notify us.  I asked her to notify before her last dose of 7.5 mg Mounjaro runs out, and we can continue to increase the dose if she would like.  Plan lab only or office visit for A1c check in 4 months.       The longitudinal plan of care for the diagnosis(es)/condition(s) as documented were addressed during this visit. Due to the added complexity in care, I will continue to support Mao in the subsequent management and with ongoing continuity of care.      This dictation uses voice recognition software, which may contain typographical errors.

## 2025-06-25 ENCOUNTER — LAB (OUTPATIENT)
Dept: LAB | Facility: CLINIC | Age: 35
End: 2025-06-25
Payer: COMMERCIAL

## 2025-06-25 DIAGNOSIS — A04.8 H. PYLORI INFECTION: ICD-10-CM

## 2025-06-25 PROCEDURE — 87338 HPYLORI STOOL AG IA: CPT

## 2025-06-27 LAB — H PYLORI AG STL QL IA: NEGATIVE

## 2025-07-09 ENCOUNTER — MYC MEDICAL ADVICE (OUTPATIENT)
Dept: FAMILY MEDICINE | Facility: CLINIC | Age: 35
End: 2025-07-09
Payer: COMMERCIAL

## 2025-07-09 DIAGNOSIS — E11.9 TYPE 2 DIABETES MELLITUS WITHOUT COMPLICATION, WITHOUT LONG-TERM CURRENT USE OF INSULIN (H): ICD-10-CM

## 2025-08-30 ENCOUNTER — HEALTH MAINTENANCE LETTER (OUTPATIENT)
Age: 35
End: 2025-08-30